# Patient Record
Sex: FEMALE | Race: WHITE | Employment: OTHER | URBAN - METROPOLITAN AREA
[De-identification: names, ages, dates, MRNs, and addresses within clinical notes are randomized per-mention and may not be internally consistent; named-entity substitution may affect disease eponyms.]

---

## 2018-07-30 ENCOUNTER — OFFICE VISIT (OUTPATIENT)
Dept: ONCOLOGY | Age: 78
End: 2018-07-30

## 2018-07-30 VITALS
HEART RATE: 56 BPM | HEIGHT: 63 IN | TEMPERATURE: 96.2 F | SYSTOLIC BLOOD PRESSURE: 177 MMHG | DIASTOLIC BLOOD PRESSURE: 75 MMHG | WEIGHT: 143.4 LBS | BODY MASS INDEX: 25.41 KG/M2 | OXYGEN SATURATION: 98 %

## 2018-07-30 DIAGNOSIS — C90.00 MULTIPLE MYELOMA NOT HAVING ACHIEVED REMISSION (HCC): Primary | ICD-10-CM

## 2018-07-30 DIAGNOSIS — Z85.118 H/O: LUNG CANCER: ICD-10-CM

## 2018-07-30 DIAGNOSIS — D51.8 VITAMIN B12 DEFICIENCY (DIETARY) ANEMIA: ICD-10-CM

## 2018-07-30 DIAGNOSIS — I10 ESSENTIAL HYPERTENSION: ICD-10-CM

## 2018-07-30 RX ORDER — SYRINGE, DISPOSABLE, 3 ML
1 SYRINGE, EMPTY DISPOSABLE MISCELLANEOUS
Qty: 12 SYRINGE | Refills: 1 | Status: SHIPPED | OUTPATIENT
Start: 2018-07-30 | End: 2019-07-19 | Stop reason: SDUPTHER

## 2018-07-30 RX ORDER — ESCITALOPRAM OXALATE 10 MG/1
TABLET ORAL
Refills: 2 | COMMUNITY
Start: 2018-05-10 | End: 2022-08-09

## 2018-07-30 RX ORDER — ZOLPIDEM TARTRATE 10 MG/1
5 TABLET ORAL DAILY
Refills: 2 | COMMUNITY
Start: 2018-06-06

## 2018-07-30 RX ORDER — LOSARTAN POTASSIUM 50 MG/1
TABLET ORAL DAILY
COMMUNITY
End: 2022-08-09

## 2018-07-30 RX ORDER — SCOLOPAMINE TRANSDERMAL SYSTEM 1 MG/1
PATCH, EXTENDED RELEASE TRANSDERMAL
Refills: 0 | COMMUNITY
Start: 2018-05-10 | End: 2022-08-09

## 2018-07-30 RX ORDER — METOPROLOL SUCCINATE 50 MG/1
TABLET, EXTENDED RELEASE ORAL DAILY
COMMUNITY

## 2018-07-30 RX ORDER — CYANOCOBALAMIN 1000 UG/ML
1000 INJECTION, SOLUTION INTRAMUSCULAR; SUBCUTANEOUS ONCE
COMMUNITY
End: 2019-10-13 | Stop reason: SDUPTHER

## 2018-07-30 RX ORDER — CYANOCOBALAMIN 1000 UG/ML
1000 INJECTION, SOLUTION INTRAMUSCULAR; SUBCUTANEOUS
Qty: 12 VIAL | Refills: 1 | Status: SHIPPED | OUTPATIENT
Start: 2018-07-30 | End: 2019-07-19 | Stop reason: SDUPTHER

## 2018-07-30 NOTE — MR AVS SNAPSHOT
[address], [address] [place] [address] Road 
[phone] Patient: Bisi Santacruz MRN: [id number]:[id number] Visit Information Date & Time Provider Department Dept. Phone Encounter #  
 7/30/2018  3:00 PM Kelley Griggs MD [facility] at [address] [place] [address] [id number] Follow-up Instructions Return in about 5 weeks (around 9/3/2018) for Multiple myeloma f/u, [place]. Upcoming Health Maintenance Date Due DTaP/Tdap/Td series (1 - Tdap) 2/5/1959 ZOSTER VACCINE AGE 60> 12/5/1997 GLAUCOMA SCREENING Q2Y 2/5/2003 Bone Densitometry (Dexa) Screening 2/5/2003 Pneumococcal 65+ High/Highest Risk (1 of 2 - PCV13) 2/5/2003 BREAST CANCER SCRN MAMMOGRAM 3/8/2017 MEDICARE YEARLY EXAM 3/14/2018 Influenza Age 5 to Adult 8/1/2018 Allergies as of 7/30/2018  Review Complete On: 7/30/2018 By: Oxana Pacheco RN Severity Noted Reaction Type Reactions Codeine  05/01/2013    Palpitations Heart races Corticosteroids (Glucocorticoids)  08/12/2015    Other (comments) \"skin turns red, extremely tired\" Other Medication  05/01/2013    Other (comments) Steroids- \"skin turns red, extremely tired\" Current Immunizations  Never Reviewed No immunizations on file. Not reviewed this visit You Were Diagnosed With   
  
 Codes Comments Multiple myeloma not having achieved remission ([facility] [address]    -  Primary ICD-10-CM: C90.00 ICD-9-CM: 203.00   
 H/O: lung cancer     ICD-10-CM: Z85.118 
ICD-9-CM: V10.11 Essential hypertension     ICD-10-CM: I10 
ICD-9-CM: 401.9 Vitamin B12 deficiency (dietary) anemia     ICD-10-CM: D51.8 ICD-9-CM: 137. 1 Vitals BP Pulse Temp Height(growth percentile) Weight(growth percentile) SpO2  
 177/75 (BP 1 Location: Left arm, BP Patient Position: Sitting) (!) 56 96.2 °F (35.7 °C) (Oral) 5' 3\" (1.6 m) 143 lb 6.4 oz (65 kg) 98% BMI OB Status Smoking Status 25.4 kg/m2 Postmenopausal Never Smoker Vitals History BMI and BSA Data Body Mass Index Body Surface Area  
 25.4 kg/m 2 1.7 m 2 Preferred Pharmacy Pharmacy Name Phone [facility] [address] AT [facility] [phone] Your Updated Medication List  
  
   
This list is accurate as of 7/30/18  4:16 PM.  Always use your most recent med list.  
  
  
  
  
 aspirin 81 mg tablet Take 81 mg by mouth.  
  
 escitalopram oxalate 10 mg tablet Commonly known as:  Sarah Basket TK 1 T PO QD  
  
 losartan 50 mg tablet Commonly known as:  COZAAR Take  by mouth daily. metoprolol succinate 50 mg XL tablet Commonly known as:  TOPROL-XL Take  by mouth daily. scopolamine 1 mg over 3 days Pt3d Commonly known as:  TRANSDERM-SCOP  
APPLY PATCH TRANSDERMALLY AS DIRECTED Q 3 DAYS  
  
 * VITAMIN B-12 PO Take  by mouth. * cyanocobalamin 1,000 mcg/mL injection Commonly known as:  VITAMIN B12  
1,000 mcg by IntraMUSCular route once. VITAMIN D3 PO Take  by mouth.  
  
 vitamin e 1,000 unit capsule Commonly known as:  E GEMS Take 1,000 Units by mouth daily. zolpidem 10 mg tablet Commonly known as:  AMBIEN  
TK 1/2 A T PO QHS PRN  
  
 * Notice: This list has 2 medication(s) that are the same as other medications prescribed for you. Read the directions carefully, and ask your doctor or other care provider to review them with you. We Performed the Following BETA-2 MICROGLOBULIN [id number] CPT(R)] CBC WITH AUTOMATED DIFF [77257 CPT(R)] FERRITIN [06547 CPT(R)] GAMMOPATHY EVAL, SPEP/TREVIN, IG QT/FLC [[id number] Custom] IRON PROFILE [id number] CPT(R)]   
 LD [69778 CPT(R)] METABOLIC PANEL, COMPREHENSIVE [53749 CPT(R)] Follow-up Instructions Return in about 5 weeks (around 9/3/2018) for Multiple myeloma f/uCinthia. To-Do List   
 Around 07/30/2018 Imaging:  XR BONE SURVEY COMP   
  
 07/31/2018 Imaging:  CT BX BONE MARROW DIAGNOSTIC Introducing hospitals & [facility] SERVICES! Dear Srinivasa Hines: Thank you for requesting a [employer] account. Our records indicate that you already have an active [employer] account. You can access your account anytime at [email or web address] Did you know that you can access your hospital and ER discharge instructions at any time in [employer]? You can also review all of your test results from your hospital stay or ER visit. Additional Information If you have questions, please visit the Frequently Asked Questions section of the [employer] website at [email or web address] Remember, [employer] is NOT to be used for urgent needs. For medical emergencies, dial 911. Now available from your iPhone and Android! Please provide this summary of care documentation to your next provider. Your primary care clinician is listed as Basim Strange. If you have any questions after today's visit, please call [phone].

## 2018-07-30 NOTE — PROGRESS NOTES
[id number] [facility] at [facility]  [phone]    Hematology / Oncology Consult    Reason for Visit:   Jayant Miller is a [de-identified] y.o. female who is seen in consultation at the request of Dr. Trav Orr for evaluation of multiple myeloma. Hematology Oncology Treatment History:     Diagnosis: Multiple myeloma    Stage: Pending    Pathology:   -Bone marrow biopsy 2/4/1998: Normocellular marrow, no pathologic diagnosis. (No evidence of plasmacytosis). -Repeat bone marrow biopsy pending    Prior Treatment: None    Current Treatment: pending    History of Present Illness:   Ms. Pam Orozco is an [de-identified] y/o female being followed for MGUS vs Multiple Myeloma at [address] [place] [address] who comes in for a second opinion. She had been Dr. Winifred Woo and more recently saw Dr. Trav Orr at [address] [place] [address]. 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  k/l ratio stable at 33. 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 Normocytic anemia in 2017 resolved. She also has B12 deficiency and has been on injections for 3-4 months. PMHX: HTN, HLD, SHA, s/p BTL, s/p tonsillectomy. SHx: Currently  to  [facility]. Her former  Jayjay Fenton passed away from Multiple Myeloma, has 3 children. Jayjay Fenton age 46, live sin Ferman Angelucci age 52 lives in [place], [place], Amy age 43 lives in [place], [place]. Pt is retired. No smoking or EtOH. -Labs 6/10/18: M-spike 3.4, CRP 0.8, ABEBA negative  -Labs 5/29/18: WBC 4.6, Hgb 11.2, MCV 94, , TSH 2.59, Hgb A1c 5.7, BUN 16, Cr 1.1    Patient states that she is not thrilled about repeating a bone marrow biopsy or undergoing treatment for multiple myeloma given her age and the fact that she saw her  suffer for 2 yrs with the disease and treatment. However, upon further discussion, she agrees to pursue bone marrow biopsy and possible treatment.  She is fairly active. Her chronic medical problems include HTN which is managed by her PCP. Past Medical History:   Diagnosis Date    Anemia     Autoimmune disease ([address]     Cancer ([facility] [address] 2015    Mass on lung.  Hx of mammogram 03/08/2016    normal    Hypertension     Monoclonal gammopathies     Pap smear for cervical cancer screening 04/25/2013    negative    Stroke [facility])     Unsure of date. Years ago. Past Surgical History:   Procedure Laterality Date    BREAST SURGERY PROCEDURE UNLISTED  1982    LEFT breast lump    HX HEENT      Lens implants    HX HEMORRHOIDECTOMY  1989    HX OTHER SURGICAL Right 09/2015    right upper lobe of lung removed due to cancer    HX TONSILLECTOMY  1962    HX TUBAL LIGATION  1969    HX WRIST FRACTURE [address]    \"cyst in wrist\"      Social History   Substance Use Topics    Smoking status: Never Smoker    Smokeless tobacco: Never Used    Alcohol use Yes      Comment: social      Family History   Problem Relation Age of Onset    Heart Disease Father     Heart Disease Sister     Diabetes Sister     Hypertension Sister     Heart Disease Brother     Heart Disease Sister     Diabetes Sister     Hypertension Sister     Heart Disease Sister     Diabetes Sister     Hypertension Sister     Hypertension Sister     Hypertension Sister     Hypertension Sister     Hypertension Brother     Hypertension Brother      Current Outpatient Prescriptions   Medication Sig    metoprolol succinate (TOPROL-XL) 50 mg XL tablet Take  by mouth daily.  losartan (COZAAR) 50 mg tablet Take  by mouth daily.  cholecalciferol, vitamin D3, (VITAMIN D3 PO) Take  by mouth.  cyanocobalamin, vitamin B-12, (VITAMIN B-12 PO) Take  by mouth.  cyanocobalamin (VITAMIN B12) 1,000 mcg/mL injection 1,000 mcg by IntraMUSCular route once.  vitamin e (E GEMS) 1,000 unit capsule Take 1,000 Units by mouth daily.  aspirin 81 mg tablet Take 81 mg by mouth.     zolpidem (AMBIEN) 10 mg tablet TK 1/2 A T PO QHS PRN    escitalopram oxalate (LEXAPRO) 10 mg tablet TK 1 T PO QD    scopolamine (TRANSDERM-SCOP) 1 mg over 3 days pt3d APPLY PATCH TRANSDERMALLY AS DIRECTED Q 3 DAYS     No current facility-administered medications for this visit. Allergies   Allergen Reactions    Codeine Palpitations     Heart races    Corticosteroids (Glucocorticoids) Other (comments)     \"skin turns red, extremely tired\"    Other Medication Other (comments)     Steroids- \"skin turns red, extremely tired\"        Review of Systems: A complete review of systems was obtained, negative except as described above. Physical Exam:     Visit Vitals    /75 (BP 1 Location: Left arm, BP Patient Position: Sitting)    Pulse (!) 56    Temp 96.2 °F (35.7 °C) (Oral)    Ht 5' 3\" (1.6 m)    Wt 143 lb 6.4 oz (65 kg)    SpO2 98%    BMI 25.4 kg/m2     ECOG PS: 0-1  General: Well developed, no acute distress  Eyes: PERRLA, EOMI, anicteric sclerae  HENT: Atraumatic, OP clear, TMs intact without erythema  Neck: Supple  Lymphatic: No cervical, supraclavicular, axillary or inguinal adenopathy  Respiratory: CTAB, normal respiratory effort  CV: Normal rate, regular rhythm, no murmurs, no peripheral edema  GI: Soft, nontender, nondistended, no masses, no hepatomegaly, no splenomegaly  MS: Normal gait and station. Digits without clubbing or cyanosis. Skin: No rashes, ecchymoses, or petechiae. Normal temperature, turgor, and texture. Neuro/Psych: Alert, oriented. 5/5 strength in all 4 extremities. Appropriate affect, normal judgment/insight. Results:     Lab Results   Component Value Date/Time    WBC 6.0 07/17/2016 12:19 PM    HGB 12.7 07/17/2016 12:19 PM    HCT 36.4 07/17/2016 12:19 PM    PLATELET 831 67/38/4245 12:19 PM    MCV 91.0 07/17/2016 12:19 PM    ABS.  NEUTROPHILS 2.9 07/17/2016 12:19 PM    Hemoglobin (POC) 11.2 (L) 09/08/2015 08:26 AM    Hematocrit (POC) 33 (L) 09/08/2015 08:26 AM     Lab Results   Component Value Date/Time    Sodium 133 (L) 07/17/2016 12:19 PM    Potassium 4.0 07/17/2016 12:19 PM    Chloride 100 07/17/2016 12:19 PM    CO2 26 07/17/2016 12:19 PM    Glucose 104 (H) 07/17/2016 12:19 PM    BUN 16 07/17/2016 12:19 PM    Creatinine 1.10 (H) 07/17/2016 12:19 PM    GFR est AA 58 (L) 07/17/2016 12:19 PM    GFR est non-AA 48 (L) 07/17/2016 12:19 PM    Calcium 9.0 07/17/2016 12:19 PM    Sodium (POC) 142 09/08/2015 08:26 AM    Potassium (POC) 3.8 09/08/2015 08:26 AM    Chloride (POC) 103 09/08/2015 08:26 AM    Glucose (POC) 114 (H) 09/14/2015 07:16 AM    BUN (POC) 18 09/08/2015 08:26 AM    Creatinine (POC) 1.1 09/08/2015 08:26 AM    Calcium, ionized (POC) 1.29 09/08/2015 08:26 AM     Lab Results   Component Value Date/Time    Bilirubin, total 0.4 07/17/2016 12:19 PM    ALT (SGPT) 17 07/17/2016 12:19 PM    AST (SGOT) 19 07/17/2016 12:19 PM    Alk. phosphatase 63 07/17/2016 12:19 PM    Protein, total 10.3 (H) 07/17/2016 12:19 PM    Albumin 4.0 07/17/2016 12:19 PM    Globulin 6.3 (H) 07/17/2016 12:19 PM     Lab Results   Component Value Date/Time    Iron 77 09/10/2015 02:25 AM    TIBC 240 (L) 09/10/2015 02:25 AM    Iron % saturation 32 09/10/2015 02:25 AM    Ferritin 160 09/10/2015 02:25 AM       Imaging:     Radiology report(s) reviewed. Assessment & Plan:   Dipesh Campo is a [de-identified] y.o. female with HTN and remote h/o lung cancer comes in for evaluation and management of multiple myeloma. 1. Multiple myeloma: Unclear if this is smoldering or active as patient has not undergone recent bone marrow biopsy and has mild anemia. Unknown whether she has bone lesions. I spent a long time discussing the difference between MGUS, smoldering myeloma and active multiple myeloma. I discussed that while her  received VRd regimen, which is the initial standard of care treatment, this is not absolute.  If she requires treatment and understands the risks/benefits, it is reasonable to start her on a doublet regimen instead of triplet, understanding that she values quality of life over progression free survival. I also discussed that we need further evaluation to determine whether she has any evidence of end organ dysfunction (CRAB criteria). For instance if her M-protein is rising and her anemia worsening, or new CRAB criteria develop, I will recommend that she start on treatment. -- Printed information about multiple myeloma  -- Check SPEP, free light chains, B2M, CBC, CMP  -- Need bone marrow biopsy since M-spike rising (was 2.8 in 4/2014)    2. H/o right lung cancer: Stage I [T2N0] s/p lobectomy by Dr. Miguel Rodriguez in 2015. Last surveillance CXR was in 6/2016.     3. HTN: On Losartan and Metoprolol. Takes ASA 81mg/d. Uncontrolled today, but pt is nervous. Will continue to monitor. 4. Vitamin B12 deficiency: Diagnosed in early 2018, on monthly B12 injections. -- Sent VitB12 injections, syringes, needles to her phamarcy    5. Goals of care: Pending    I appreciate the opportunity to participate in Ms. Sasha Yusuf's care.     Signed By: Jeffry Bell MD     July 30, 2018

## 2018-07-31 ENCOUNTER — HOSPITAL ENCOUNTER (OUTPATIENT)
Dept: LAB | Age: 78
Discharge: HOME | End: 2018-07-31
Payer: MEDICARE

## 2018-07-31 ENCOUNTER — HOSPITAL ENCOUNTER (OUTPATIENT)
Dept: GENERAL RADIOLOGY | Age: 78
Discharge: HOME | End: 2018-07-31
Payer: MEDICARE

## 2018-07-31 DIAGNOSIS — C90.00 MULTIPLE MYELOMA NOT HAVING ACHIEVED REMISSION (HCC): ICD-10-CM

## 2018-07-31 PROCEDURE — 83550 IRON BINDING TEST: CPT

## 2018-07-31 PROCEDURE — 83615 LACTATE (LD) (LDH) ENZYME: CPT

## 2018-07-31 PROCEDURE — 85025 COMPLETE CBC W/AUTO DIFF WBC: CPT

## 2018-07-31 PROCEDURE — 82728 ASSAY OF FERRITIN: CPT

## 2018-07-31 PROCEDURE — 36415 COLL VENOUS BLD VENIPUNCTURE: CPT

## 2018-07-31 PROCEDURE — 82232 ASSAY OF BETA-2 PROTEIN: CPT

## 2018-07-31 PROCEDURE — 80053 COMPREHEN METABOLIC PANEL: CPT

## 2018-07-31 PROCEDURE — 84165 PROTEIN E-PHORESIS SERUM: CPT

## 2018-07-31 PROCEDURE — 77075 RADEX OSSEOUS SURVEY COMPL: CPT

## 2018-08-02 LAB
ALBUMIN SERPL ELPH-MCNC: 3.6 G/DL (ref 2.9–4.4)
ALBUMIN SERPL-MCNC: 3.7 G/DL (ref 3.5–4.7)
ALBUMIN/GLOB SERPL: 0.7 {RATIO} (ref 0.7–1.7)
ALBUMIN/GLOB SERPL: 0.7 {RATIO} (ref 1.2–2.2)
ALP SERPL-CCNC: 51 IU/L (ref 39–117)
ALPHA1 GLOB SERPL ELPH-MCNC: 0.3 G/DL (ref 0–0.4)
ALPHA2 GLOB SERPL ELPH-MCNC: 0.7 G/DL (ref 0.4–1)
ALT SERPL-CCNC: 7 IU/L (ref 0–32)
AST SERPL-CCNC: 16 IU/L (ref 0–40)
B-GLOBULIN SERPL ELPH-MCNC: 0.9 G/DL (ref 0.7–1.3)
B2 MICROGLOB SERPL-MCNC: 2.6 MG/L (ref 0.6–2.4)
BASOPHILS # BLD AUTO: 0 X10E3/UL (ref 0–0.2)
BASOPHILS NFR BLD AUTO: 1 %
BILIRUB SERPL-MCNC: 0.4 MG/DL (ref 0–1.2)
BUN SERPL-MCNC: 8 MG/DL (ref 8–27)
BUN/CREAT SERPL: 9 (ref 12–28)
CALCIUM SERPL-MCNC: 8.9 MG/DL (ref 8.7–10.3)
CHLORIDE SERPL-SCNC: 106 MMOL/L (ref 96–106)
CO2 SERPL-SCNC: 20 MMOL/L (ref 20–29)
CREAT SERPL-MCNC: 0.89 MG/DL (ref 0.57–1)
EOSINOPHIL # BLD AUTO: 0.2 X10E3/UL (ref 0–0.4)
EOSINOPHIL NFR BLD AUTO: 5 %
ERYTHROCYTE [DISTWIDTH] IN BLOOD BY AUTOMATED COUNT: 15.8 % (ref 12.3–15.4)
FERRITIN SERPL-MCNC: 115 NG/ML (ref 15–150)
GAMMA GLOB SERPL ELPH-MCNC: 3.6 G/DL (ref 0.4–1.8)
GLOBULIN SER CALC-MCNC: 5.4 G/DL (ref 1.5–4.5)
GLOBULIN SER-MCNC: 5.5 G/DL (ref 2.2–3.9)
GLUCOSE SERPL-MCNC: 87 MG/DL (ref 65–99)
HCT VFR BLD AUTO: 32.2 % (ref 34–46.6)
HGB BLD-MCNC: 11.1 G/DL (ref 11.1–15.9)
IGA SERPL-MCNC: 13 MG/DL (ref 64–422)
IGG SERPL-MCNC: 4104 MG/DL (ref 700–1600)
IGM SERPL-MCNC: 16 MG/DL (ref 26–217)
IMM GRANULOCYTES # BLD: 0 X10E3/UL (ref 0–0.1)
IMM GRANULOCYTES NFR BLD: 0 %
INTERPRETATION SERPL IEP-IMP: ABNORMAL
IRON SATN MFR SERPL: 30 % (ref 15–55)
IRON SERPL-MCNC: 88 UG/DL (ref 27–139)
KAPPA LC FREE SER-MCNC: 261 MG/L (ref 3.3–19.4)
KAPPA LC FREE/LAMBDA FREE SER: 37.29 {RATIO} (ref 0.26–1.65)
LAMBDA LC FREE SERPL-MCNC: 7 MG/L (ref 5.7–26.3)
LDH SERPL-CCNC: 146 IU/L (ref 119–226)
LYMPHOCYTES # BLD AUTO: 2.1 X10E3/UL (ref 0.7–3.1)
LYMPHOCYTES NFR BLD AUTO: 42 %
M PROTEIN SERPL ELPH-MCNC: 3.6 G/DL
MCH RBC QN AUTO: 31.6 PG (ref 26.6–33)
MCHC RBC AUTO-ENTMCNC: 34.5 G/DL (ref 31.5–35.7)
MCV RBC AUTO: 92 FL (ref 79–97)
MONOCYTES # BLD AUTO: 0.3 X10E3/UL (ref 0.1–0.9)
MONOCYTES NFR BLD AUTO: 5 %
NEUTROPHILS # BLD AUTO: 2.3 X10E3/UL (ref 1.4–7)
NEUTROPHILS NFR BLD AUTO: 47 %
PLATELET # BLD AUTO: 198 X10E3/UL (ref 150–379)
PLEASE NOTE:, 149534: ABNORMAL
POTASSIUM SERPL-SCNC: 4.1 MMOL/L (ref 3.5–5.2)
PROT SERPL-MCNC: 9.1 G/DL (ref 6–8.5)
RBC # BLD AUTO: 3.51 X10E6/UL (ref 3.77–5.28)
SODIUM SERPL-SCNC: 137 MMOL/L (ref 134–144)
TIBC SERPL-MCNC: 291 UG/DL (ref 250–450)
UIBC SERPL-MCNC: 203 UG/DL (ref 118–369)
WBC # BLD AUTO: 4.9 X10E3/UL (ref 3.4–10.8)

## 2018-08-06 ENCOUNTER — HOSPITAL ENCOUNTER (OUTPATIENT)
Dept: CT IMAGING | Age: 78
Discharge: HOME | End: 2018-08-06
Attending: INTERNAL MEDICINE
Payer: MEDICARE

## 2018-08-06 VITALS
HEIGHT: 63 IN | HEART RATE: 53 BPM | SYSTOLIC BLOOD PRESSURE: 169 MMHG | BODY MASS INDEX: 25.34 KG/M2 | OXYGEN SATURATION: 97 % | DIASTOLIC BLOOD PRESSURE: 71 MMHG | WEIGHT: 143 LBS | RESPIRATION RATE: 13 BRPM

## 2018-08-06 DIAGNOSIS — C90.00 MULTIPLE MYELOMA NOT HAVING ACHIEVED REMISSION (HCC): ICD-10-CM

## 2018-08-06 LAB
BASOPHILS # BLD: 0 K/UL (ref 0–0.1)
BASOPHILS NFR BLD: 1 % (ref 0–1)
DIFFERENTIAL METHOD BLD: ABNORMAL
EOSINOPHIL # BLD: 0.3 K/UL (ref 0–0.4)
EOSINOPHIL NFR BLD: 5 % (ref 0–7)
ERYTHROCYTE [DISTWIDTH] IN BLOOD BY AUTOMATED COUNT: 14.1 % (ref 11.5–14.5)
HCT VFR BLD AUTO: 34.9 % (ref 35–47)
HGB BLD-MCNC: 11.6 G/DL (ref 11.5–16)
IMM GRANULOCYTES # BLD: 0 K/UL (ref 0–0.04)
IMM GRANULOCYTES NFR BLD AUTO: 0 % (ref 0–0.5)
LYMPHOCYTES # BLD: 2 K/UL (ref 0.8–3.5)
LYMPHOCYTES NFR BLD: 36 % (ref 12–49)
MCH RBC QN AUTO: 32 PG (ref 26–34)
MCHC RBC AUTO-ENTMCNC: 33.2 G/DL (ref 30–36.5)
MCV RBC AUTO: 96.1 FL (ref 80–99)
MONOCYTES # BLD: 0.3 K/UL (ref 0–1)
MONOCYTES NFR BLD: 6 % (ref 5–13)
NEUTS SEG # BLD: 2.9 K/UL (ref 1.8–8)
NEUTS SEG NFR BLD: 53 % (ref 32–75)
NRBC # BLD: 0 K/UL (ref 0–0.01)
NRBC BLD-RTO: 0 PER 100 WBC
PLATELET # BLD AUTO: 172 K/UL (ref 150–400)
PMV BLD AUTO: 10.9 FL (ref 8.9–12.9)
RBC # BLD AUTO: 3.63 M/UL (ref 3.8–5.2)
WBC # BLD AUTO: 5.6 K/UL (ref 3.6–11)

## 2018-08-06 PROCEDURE — 88305 TISSUE EXAM BY PATHOLOGIST: CPT | Performed by: INTERNAL MEDICINE

## 2018-08-06 PROCEDURE — 99152 MOD SED SAME PHYS/QHP 5/>YRS: CPT

## 2018-08-06 PROCEDURE — 38221 DX BONE MARROW BIOPSIES: CPT

## 2018-08-06 PROCEDURE — 88341 IMHCHEM/IMCYTCHM EA ADD ANTB: CPT | Performed by: INTERNAL MEDICINE

## 2018-08-06 PROCEDURE — 88365 INSITU HYBRIDIZATION (FISH): CPT | Performed by: INTERNAL MEDICINE

## 2018-08-06 PROCEDURE — 85025 COMPLETE CBC W/AUTO DIFF WBC: CPT | Performed by: RADIOLOGY

## 2018-08-06 PROCEDURE — 74011250636 HC RX REV CODE- 250/636

## 2018-08-06 PROCEDURE — 88342 IMHCHEM/IMCYTCHM 1ST ANTB: CPT | Performed by: INTERNAL MEDICINE

## 2018-08-06 PROCEDURE — 77030028872 HC BN BIOP NDL ON CNTRL TY TELE -C

## 2018-08-06 PROCEDURE — 88364 INSITU HYBRIDIZATION (FISH): CPT | Performed by: INTERNAL MEDICINE

## 2018-08-06 PROCEDURE — 88313 SPECIAL STAINS GROUP 2: CPT | Performed by: INTERNAL MEDICINE

## 2018-08-06 PROCEDURE — 85097 BONE MARROW INTERPRETATION: CPT | Performed by: INTERNAL MEDICINE

## 2018-08-06 PROCEDURE — 77030014115

## 2018-08-06 PROCEDURE — 88311 DECALCIFY TISSUE: CPT | Performed by: INTERNAL MEDICINE

## 2018-08-06 PROCEDURE — 36415 COLL VENOUS BLD VENIPUNCTURE: CPT | Performed by: RADIOLOGY

## 2018-08-06 PROCEDURE — 74011250636 HC RX REV CODE- 250/636: Performed by: RADIOLOGY

## 2018-08-06 RX ORDER — LIDOCAINE HYDROCHLORIDE 10 MG/ML
10 INJECTION INFILTRATION; PERINEURAL
Status: ACTIVE | OUTPATIENT
Start: 2018-08-06 | End: 2018-08-06

## 2018-08-06 RX ORDER — FENTANYL CITRATE 50 UG/ML
100 INJECTION, SOLUTION INTRAMUSCULAR; INTRAVENOUS
Status: DISCONTINUED | OUTPATIENT
Start: 2018-08-06 | End: 2018-08-06

## 2018-08-06 RX ORDER — MIDAZOLAM HYDROCHLORIDE 1 MG/ML
5 INJECTION, SOLUTION INTRAMUSCULAR; INTRAVENOUS
Status: DISCONTINUED | OUTPATIENT
Start: 2018-08-06 | End: 2018-08-06

## 2018-08-06 RX ORDER — LIDOCAINE HYDROCHLORIDE 10 MG/ML
INJECTION, SOLUTION EPIDURAL; INFILTRATION; INTRACAUDAL; PERINEURAL
Status: COMPLETED
Start: 2018-08-06 | End: 2018-08-06

## 2018-08-06 RX ORDER — IBUPROFEN 200 MG
200 TABLET ORAL AS NEEDED
COMMUNITY
End: 2022-08-09

## 2018-08-06 RX ADMIN — MIDAZOLAM 0.5 MG: 1 INJECTION INTRAMUSCULAR; INTRAVENOUS at 10:26

## 2018-08-06 RX ADMIN — MIDAZOLAM 1 MG: 1 INJECTION INTRAMUSCULAR; INTRAVENOUS at 10:10

## 2018-08-06 RX ADMIN — FENTANYL CITRATE 25 MCG: 50 INJECTION, SOLUTION INTRAMUSCULAR; INTRAVENOUS at 10:14

## 2018-08-06 RX ADMIN — LIDOCAINE HYDROCHLORIDE 5 ML: 10 INJECTION, SOLUTION EPIDURAL; INFILTRATION; INTRACAUDAL; PERINEURAL at 10:32

## 2018-08-06 RX ADMIN — FENTANYL CITRATE 25 MCG: 50 INJECTION, SOLUTION INTRAMUSCULAR; INTRAVENOUS at 10:18

## 2018-08-06 RX ADMIN — MIDAZOLAM 1 MG: 1 INJECTION INTRAMUSCULAR; INTRAVENOUS at 10:18

## 2018-08-06 RX ADMIN — MIDAZOLAM 1 MG: 1 INJECTION INTRAMUSCULAR; INTRAVENOUS at 10:14

## 2018-08-06 RX ADMIN — FENTANYL CITRATE 25 MCG: 50 INJECTION, SOLUTION INTRAMUSCULAR; INTRAVENOUS at 10:10

## 2018-08-06 NOTE — IP AVS SNAPSHOT
[address] 
[phone] Patient: Cass Yung MRN: [id number] TIZ:[id number] About your hospitalization You were admitted on:  August 6, 2018 You last received care in the:  [facility] RAD CT You were discharged on:  August 6, 2018 Why you were hospitalized Your primary diagnosis was:  Not on File Follow-up Information None Your Scheduled Appointments Tuesday September 11, 2018  1:30 PM EDT Any with Neelima Maier NP  
41 [facility] 301 [facility], [address] [place] [address]  
[phone] Discharge Orders None A check yumiko indicates which time of day the medication should be taken. My Medications ASK your doctor about these medications Instructions Each Dose to Equal  
 Morning Noon Evening Bedtime  
 aspirin 81 mg tablet Your last dose was: Your next dose is: Take 81 mg by mouth. 81 mg  
    
   
   
   
  
 escitalopram oxalate 10 mg tablet Commonly known as:  [place] Shaqir Your last dose was: Your next dose is:    
   
   
 TK 1 T PO QD  
     
   
   
   
  
 ibuprofen 200 mg tablet Commonly known as:  MOTRIN Your last dose was: Your next dose is: Take 200 mg by mouth as needed for Pain. 200 mg  
    
   
   
   
  
 losartan 50 mg tablet Commonly known as:  COZAAR Your last dose was: Your next dose is: Take  by mouth daily. metoprolol succinate 50 mg XL tablet Commonly known as:  TOPROL-XL Your last dose was: Your next dose is: Take  by mouth daily. Safety [place] 25 x 5/8 \" Ndle Your last dose was: Your next dose is:    
   
   
 1 Each by Does Not Apply route every thirty (30) days. 1 Each scopolamine 1 mg over 3 days Pt3d Commonly known as:  TRANSDERM-SCOP Your last dose was: Your next dose is:    
   
   
 APPLY PATCH TRANSDERMALLY AS DIRECTED Q 3 DAYS Syringe (Disposable) 3 mL Syrg Your last dose was: Your next dose is:    
   
   
 1 Each by Does Not Apply route every thirty (30) days. 1 Each * VITAMIN B-12 PO Your last dose was: Your next dose is: Take  by mouth. * cyanocobalamin 1,000 mcg/mL injection Commonly known as:  VITAMIN B12 Your last dose was: Your next dose is:    
   
   
 1,000 mcg by IntraMUSCular route once. 1000 mcg * cyanocobalamin 1,000 mcg/mL injection Commonly known as:  VITAMIN B12 Your last dose was: Your next dose is:    
   
   
 1 mL by IntraMUSCular route every thirty (30) days. 1000 mcg VITAMIN D3 PO Your last dose was: Your next dose is: Take  by mouth.  
     
   
   
   
  
 vitamin e 1,000 unit capsule Commonly known as:  E GEMS Your last dose was: Your next dose is: Take 1,000 Units by mouth daily. 1000 Units  
    
   
   
   
  
 zolpidem 10 mg tablet Commonly known as:  AMBIEN Your last dose was: Your next dose is:    
   
   
 TK 1/2 A T PO QHS PRN  
     
   
   
   
  
 * Notice: This list has 3 medication(s) that are the same as other medications prescribed for you. Read the directions carefully, and ask your doctor or other care provider to review them with you. Discharge Instructions Sedation for a Medical Procedure: After Your Visit  Instructions. Sedatives are used to relax you for a procedure. You may or may not be awake during the procedure. Common side effects of sedation medications include: Drowsiness, dizziness, euphoria, sleepiness or confusion. I 
            Unsteady gait. Loss of fine muscle control and delayed reaction time. Visual                     disturbances, difficulty focusing and blurred vision. Impaired memory recall. Follow-up care is a key component for your health and safety. Be sure to make and go to all medical appointments. Call your doctor if you are having problems. It's also a good idea to keep a list of your allergies, medicines with doses and test results on hand Home care following your sedation procedure: You may experience some of these side effects or you may not be aware of subtle changes in your behavior or reaction time. Because you received these medications, we are giving you the following instructions: Activity For your safety, you should not drive until the medicine wears off and you can think clearly and react easily. Do not drive for 24 hours. Rest when you feel tired. Getting enough sleep will help you recover. Diet You can eat your normal diet. If your stomach is upset, try bland, low-fat foods like plain rice, broiled chicken, toast, and yogurt. Drink plenty of fluids unless your doctor advised you to limit your fluids. Do not consume alcoholic beverages for 24 hours. Instructions Do not make important personal, business, or legal decisions for 24 hours. Move slowly and carefully, do not make sudden position changes. Be alert for dizziness or lightheadedness and move accordingly. Have a responsible person assist you. Do not drive for 24 hours. Do not operate equipment for 24 hours. [employer] mowers, power tools, kitchen appliances, etc.) Discharge medications: 
Resume prior to test medications as prescribed by your personal physician. If you have any questions or concerns call Radiology RN at [phone] After hours call Radiology on-call at [phone] Call 911 any time you think you may need emergency care. For example:  
             Call if you passed out (lost consciousness). The medicine is not wearing off and you cannot think clearly. Watch closely for changes in your health, and be sure to contact your doctor if                  you have any problems. Where can you learn more? Go to Slyce.be Enter K155 in the search box to learn more about \"Sedation for a Medical Procedure: After Your Visit. \"   
 
  
Bone Marrow Aspiration and Biopsy: What to Expect at Home Your Recovery The biopsy site may feel sore for several days. It can help to walk, take pain medicine, and put ice packs on the site. You will probably be able to return to work and your usual activities the day after the procedure. Your doctor or nurse will call you with the results of your test. 
This care sheet gives you a general idea about how long it will take for you to recover. But each person recovers at a different pace. Follow the steps below to get better as quickly as possible. How can you care for yourself at home? Activity 
  · Rest when you feel tired. Getting enough sleep will help you recover.  
  · You may drive when you are no longer taking pain pills and can quickly move your foot from the gas pedal to the brake. You must also be able to sit comfortably for a long period of time, even if you do not plan to go far. You might get caught in traffic.  
  · Most people are able to return to work the day after the procedure. Medicines 
  · Your doctor will tell you if and when you can restart your medicines. He or she will also give you instructions about taking any new medicines.  
  · If you take blood thinners, such as warfarin (Coumadin), clopidogrel (Plavix), or aspirin, be sure to talk to your doctor.  He or she will tell you if and when to start taking those medicines again. Make sure that you understand exactly what your doctor wants you to do.  
  · Be safe with medicines. Take pain medicines exactly as directed. ¨ If the doctor gave you a prescription medicine for pain, take it as prescribed. ¨ If you are not taking a prescription pain medicine, take an over-the-counter medicine such as acetaminophen (Tylenol), ibuprofen (Advil, Motrin), or naproxen (Aleve). Read and follow all instructions on the label. ¨ Do not take two or more pain medicines at the same time unless the doctor told you to. Many pain medicines have acetaminophen, which is Tylenol. Too much acetaminophen (Tylenol) can be harmful.  
  · If you think your pain medicine is making you sick to your stomach: 
¨ Take your medicine after meals (unless your doctor has told you not to). ¨ Ask your doctor for a different pain medicine.  
  · If your doctor prescribed antibiotics, take them as directed. Do not stop taking them just because you feel better.  
 Ice 
  · Put ice or a cold pack on the biopsy site for 10 to 20 minutes at a time. Put a thin cloth between the ice and your skin. Follow-up care is a key part of your treatment and safety. Be sure to make and go to all appointments, and call your doctor if you are having problems. It's also a good idea to know your test results and keep a list of the medicines you take. When should you call for help? Call 911 anytime you think you may need emergency care. For example, call if: 
  · You passed out (lost consciousness).  
 Call your doctor now or seek immediate medical care if: 
  · You have signs of infection, such as: 
¨ Increased pain, swelling, warmth, or redness. ¨ Red streaks leading from the biopsy site. ¨ Pus draining from the biopsy site. ¨ Swollen lymph nodes in your neck, armpits, or groin. ¨ A fever.  
 Watch closely for any changes in your health, and be sure to contact your doctor if:   · You are not getting better as expected. Where can you learn more? Go to http://domenic-daniel.info/. Enter E148 in the search box to learn more about \"Bone Marrow Aspiration and Biopsy: What to Expect at Home. \" Current as of: September 10, 2017 Content Version: 11.7 © 0901-8276 [employer]. Care instructions adapted under license by [employer] (which disclaims liability or warranty for this information). If you have questions about a medical condition or this instruction, always ask your healthcare professional. [address] any warranty or liability for your use of this information. Introducing [facility]! Dear Christoph Wharton: Thank you for requesting a [employer] account. Our records indicate that you already have an active [employer] account. You can access your account anytime at [email or web address] Did you know that you can access your hospital and ER discharge instructions at any time in [employer]? You can also review all of your test results from your hospital stay or ER visit. Additional Information If you have questions, please visit the Frequently Asked Questions section of the [employer] website at [email or web address] Remember, [employer] is NOT to be used for urgent needs. For medical emergencies, dial 911. Now available from your iPhone and Android! Introducing [employer] As a [facility] patient, I wanted to make you aware of our electronic visit tool called [employer]. [facility] 24/7 allows you to connect within minutes with a medical provider 24 hours a day, seven days a week via a mobile device or tablet or logging into a secure website from your computer. You can access [employer] from anywhere in the [place].  
 
A virtual visit might be right for you when you have a simple condition and feel like you just dont want to get out of bed, or cant get away from work for an appointment, when your regular [facility] provider is not available (evenings, weekends or holidays), or when youre out of town and need minor care. Electronic visits cost only $49 and if the [employer] 24/7 provider determines a prescription is needed to treat your condition, one can be electronically transmitted to a nearby pharmacy*. Please take a moment to enroll today if you have not already done so. The enrollment process is free and takes just a few minutes. To enroll, please download the [employer] 24/7 eloise to your tablet or phone, or visit [email or web address] to enroll on your computer. And, as an [address] [place] [address] patient with a [employer] account, the results of your visits will be scanned into your electronic medical record and your primary care provider will be able to view the scanned results. We urge you to continue to see your regular [facility] provider for your ongoing medical care. And while your primary care provider may not be the one available when you seek a [employer] virtual visit, the peace of mind you get from getting a real diagnosis real time can be priceless. For more information on [employer], view our Frequently Asked Questions (FAQs) at [email or web address]. Sincerely, 
 
Faisal Hoyt MD 
Chief Medical Officer [address] *:  certain medications cannot be prescribed via [employer] Unresulted Labs-Please follow up with your PCP about these lab tests Order Current Status CT BX BONE MARROW DIAGNOSTIC In process Providers Seen During Your Hospitalization Provider Specialty Primary office phone Barry Garcia MD Hematology and Oncology [phone] Your Primary Care Physician (PCP) Primary Care Physician Office Phone Office Fax Carmelita Officer [phone] You are allergic to the following Allergen Reactions Codeine Palpitations Heart races Corticosteroids (Glucocorticoids) Other (comments) \"skin turns red, extremely tired\" Other Medication Other (comments) Steroids- \"skin turns red, extremely tired\" Recent Documentation Height Weight Breastfeeding? BMI OB Status Smoking Status 1.6 m 64.9 kg No 25.33 kg/m2 Postmenopausal Never Smoker Emergency Contacts Name Discharge Info Relation Home Work Mobile Patrick Yusuf DISCHARGE CAREGIVER [3] Spouse [3] [phone]  
 GEETHA,Aquilino  Child [2] [phone]    
 Aquilino INIGUEZ  Other Relative [6] [phone] Patient Belongings The following personal items are in your possession at time of discharge: 
     Visual Aid: None Please provide this summary of care documentation to your next provider. Signatures-by signing, you are acknowledging that this After Visit Summary has been reviewed with you and you have received a copy. Patient Signature:  ____________________________________________________________ Date:  ____________________________________________________________  
  
Savanna Estevez Provider Signature:  ____________________________________________________________ Date:  ____________________________________________________________

## 2018-08-06 NOTE — PROGRESS NOTES
Pt received to [facility] ambulatory. Changed to gown and assessed. IV started in L AC, blood drawn/sent. Vitals taken, BP elevated, but pt nervous. LS clear, HS S1, S2. Mallampati 2 with 3 fingers. Confirmed NPO and allergies. Dr Joey Rogers here for consent. 1000--To CT, timeout at 1020, start time 1021. Pt tolerated procedure well, total of 3.5 mg versed and 75 mcg fentanyl given for procedure. Clean dressing applied to back site. Returned to [facility] in stable condition, states no pain, drowsy. Tolerating PO. Pt's  notified of pt's status. Discharge directions given and reviewed with patient and spouse. 1127--Pt IV removed, pt dressed and taken out to [facility] for discharge.

## 2018-08-06 NOTE — H&P
Interventional Radiology History and Physical (Outpatient)    4/7/0812    Patient: Zoey Lopez [de-identified] y.o. female     Referring Physician:  Reynaldo Bryan MD    Chief Complaint: anemia    History of Present Illness: bone marrow biopsy with sedation    History:  Past Medical History:   Diagnosis Date    Anemia     [facility] ([facility] [address] [facility]     Cancer ([facility] [address] 2015    Mass on lung.  Hx of mammogram 03/08/2016    normal    Hypertension     Monoclonal gammopathies     Pap smear for cervical cancer screening 04/25/2013    negative    Stroke [facility])     Unsure of date. Years ago. Family History   Problem Relation Age of Onset    Heart Disease Father     Heart Disease Sister     Diabetes Sister     Hypertension Sister     Heart Disease Brother     Heart Disease Sister     Diabetes Sister     Hypertension Sister     Heart Disease Sister     Diabetes Sister     Hypertension Sister     Hypertension Sister     Hypertension Sister     Hypertension Sister     Hypertension Brother     Hypertension Brother      Social History     Social History    Marital status:      Spouse name: N/A    Number of children: N/A    Years of education: N/A     Occupational History    Not on file. Social History Main Topics    Smoking status: Never Smoker    Smokeless tobacco: Never Used    Alcohol use Yes      Comment: social    Drug use: No    Sexual activity: No     Other Topics Concern    Not on file     Social History Narrative       Allergies: Allergies   Allergen Reactions    Codeine Palpitations     Heart races    Corticosteroids (Glucocorticoids) Other (comments)     \"skin turns red, extremely tired\"    Other Medication Other (comments)     Steroids- \"skin turns red, extremely tired\"       Prior to Admission Medications:  Prior to Admission medications    Medication Sig Start Date End Date Taking?  Authorizing Provider   ibuprofen (MOTRIN) 200 mg tablet Take 200 mg by mouth as needed for Pain.   Yes Historical Provider   metoprolol succinate (TOPROL-XL) 50 mg XL tablet Take  by mouth daily. Yes Historical Provider   losartan (COZAAR) 50 mg tablet Take  by mouth daily. Yes Historical Provider   zolpidem (AMBIEN) 10 mg tablet TK 1/2 A T PO QHS PRN 6/6/18  Yes Historical Provider   cholecalciferol, vitamin D3, (VITAMIN D3 PO) Take  by mouth. Yes Historical Provider   cyanocobalamin, vitamin B-12, (VITAMIN B-12 PO) Take  by mouth. Yes Historical Provider   escitalopram oxalate (LEXAPRO) 10 mg tablet TK 1 T PO QD 5/10/18  Yes Historical Provider   cyanocobalamin (VITAMIN B12) 1,000 mcg/mL injection 1,000 mcg by IntraMUSCular route once. Yes Historical Provider   vitamin e (E GEMS) 1,000 unit capsule Take 1,000 Units by mouth daily. Yes Historical Provider   aspirin 81 mg tablet Take 81 mg by mouth. Yes Historical Provider   scopolamine (TRANSDERM-SCOP) 1 mg over 3 days pt3d APPLY PATCH TRANSDERMALLY AS DIRECTED Q 3 DAYS 5/10/18   Historical Provider   cyanocobalamin (VITAMIN B12) 1,000 mcg/mL injection 1 mL by IntraMUSCular route every thirty (30) days. 7/30/18   Brianda Bowers MD   Syringe, Disposable, 3 mL syrg 1 Each by Does Not Apply route every thirty (30) days. 7/30/18   Brianda Bowers MD   Safety [place] 25 x 5/8 \" ndle 1 Each by Does Not Apply route every thirty (30) days. 7/30/18   Brianda Bowers MD       Physical Exam:    There were no vitals taken for this visit. General: alert, cooperative, no distress, appears stated age  Heart: normal S1 and S2  Lungs: clear to auscultation bilaterally      Plan of Care/Planned Procedure:  Risks, benefits, and alternatives reviewed with patient and she agrees to proceed with the procedure.      Shannon Patel MD

## 2018-08-06 NOTE — DISCHARGE INSTRUCTIONS
Sedation for a Medical Procedure: After Your Visit  Instructions. Sedatives are used to relax you for a procedure. You may or may not be awake during the procedure. Common side effects of sedation medications include:                   Drowsiness, dizziness, euphoria, sleepiness or confusion. I              Unsteady gait. Loss of fine muscle control and delayed reaction time. Visual                     disturbances, difficulty focusing and blurred vision. Impaired memory recall. Follow-up care is a key component for your health and safety. Be sure to make and go to all medical appointments. Call your doctor if you are having problems. It's also a good idea to keep a list of your allergies, medicines with doses and test results on hand    Home care following your sedation procedure: You may experience some of these side effects or you may not be aware of subtle changes in your behavior or reaction time. Because you received these medications, we are giving you the following instructions: Activity   For your safety, you should not drive until the medicine wears off and you can think clearly and react easily. Do not drive for 24 hours. Rest when you feel tired. Getting enough sleep will help you recover. Diet    You can eat your normal diet. If your stomach is upset, try bland, low-fat foods like plain rice, broiled chicken, toast, and yogurt. Drink plenty of fluids unless your doctor advised you to limit your fluids. Do not consume alcoholic beverages for 24 hours. Instructions  Do not make important personal, business, or legal decisions for 24 hours. Move slowly and carefully, do not make sudden position changes. Be alert for dizziness or lightheadedness and move accordingly. Have a responsible person assist you. Do not drive for 24 hours. Do not operate equipment for 24 hours. 4moms, power tools, kitchen appliances, etc.)    Discharge medications:  Resume prior to test medications as prescribed by your personal physician. If you have any questions or concerns call Radiology RN at [phone]  After hours call Radiology on-call at [phone]    Call [phone] any time you think you may need emergency care. For example:                Call if you passed out (lost consciousness). The medicine is not wearing off and you cannot think clearly. Watch closely for changes in your health, and be sure to contact your doctor if                  you have any problems. Where can you learn more? Go to nkf-pharma.be   Enter G817 in the search box to learn more about \"Sedation for a Medical Procedure: After Your Visit. \"         Bone Marrow Aspiration and Biopsy: What to Expect at Home  Your Recovery  The biopsy site may feel sore for several days. It can help to walk, take pain medicine, and put ice packs on the site. You will probably be able to return to work and your usual activities the day after the procedure. Your doctor or nurse will call you with the results of your test.  This care sheet gives you a general idea about how long it will take for you to recover. But each person recovers at a different pace. Follow the steps below to get better as quickly as possible. How can you care for yourself at home? Activity    · Rest when you feel tired. Getting enough sleep will help you recover.     · You may drive when you are no longer taking pain pills and can quickly move your foot from the gas pedal to the brake. You must also be able to sit comfortably for a long period of time, even if you do not plan to go far. You might get caught in traffic.     · Most people are able to return to work the day after the procedure. Medicines    · Your doctor will tell you if and when you can restart your medicines.  He or she will also give you instructions about taking any new medicines.     · If you take blood thinners, such as warfarin (Coumadin), clopidogrel (Plavix), or aspirin, be sure to talk to your doctor. He or she will tell you if and when to start taking those medicines again. Make sure that you understand exactly what your doctor wants you to do.     · Be safe with medicines. Take pain medicines exactly as directed. ¨ If the doctor gave you a prescription medicine for pain, take it as prescribed. ¨ If you are not taking a prescription pain medicine, take an over-the-counter medicine such as acetaminophen (Tylenol), ibuprofen (Advil, Motrin), or naproxen (Aleve). Read and follow all instructions on the label. ¨ Do not take two or more pain medicines at the same time unless the doctor told you to. Many pain medicines have acetaminophen, which is Tylenol. Too much acetaminophen (Tylenol) can be harmful.     · If you think your pain medicine is making you sick to your stomach:  ¨ Take your medicine after meals (unless your doctor has told you not to). ¨ Ask your doctor for a different pain medicine.     · If your doctor prescribed antibiotics, take them as directed. Do not stop taking them just because you feel better.    Ice    · Put ice or a cold pack on the biopsy site for 10 to 20 minutes at a time. Put a thin cloth between the ice and your skin. Follow-up care is a key part of your treatment and safety. Be sure to make and go to all appointments, and call your doctor if you are having problems. It's also a good idea to know your test results and keep a list of the medicines you take. When should you call for help? Call 911 anytime you think you may need emergency care. For example, call if:    · You passed out (lost consciousness).    Call your doctor now or seek immediate medical care if:    · You have signs of infection, such as:  ¨ Increased pain, swelling, warmth, or redness. ¨ Red streaks leading from the biopsy site.   ¨ Pus draining from the biopsy site. ¨ Swollen lymph nodes in your neck, armpits, or groin. ¨ A fever.    Watch closely for any changes in your health, and be sure to contact your doctor if:    · You are not getting better as expected. Where can you learn more? Go to http://domenic-daniel.info/. Enter E148 in the search box to learn more about \"Bone Marrow Aspiration and Biopsy: What to Expect at Home. \"  Current as of: September 10, 2017  Content Version: 11.7  © 6069-8460 [employer]. Care instructions adapted under license by [employer] (which disclaims liability or warranty for this information). If you have questions about a medical condition or this instruction, always ask your healthcare professional. [address] any warranty or liability for your use of this information.

## 2018-08-13 ENCOUNTER — TELEPHONE (OUTPATIENT)
Dept: ONCOLOGY | Age: 78
End: 2018-08-13

## 2018-08-13 NOTE — TELEPHONE ENCOUNTER
[address] at [facility]  [phone]      08/13/18 12:53 PM Spoke with patient. She stated that she would prefer to come in next week to discuss bone marrow biopsy results. Her  has liver cancer and has an appointment in [place] in September. Scheduled patient to see Dr. Mich Mabry Tuesday 08/21 at 10 AM per her request. No additional questions or concerns at this time.

## 2018-08-13 NOTE — TELEPHONE ENCOUNTER
Pt called and left a voicemail requesting her bone marrow biopsy results.  Call back number [phone]

## 2018-08-21 ENCOUNTER — OFFICE VISIT (OUTPATIENT)
Dept: ONCOLOGY | Age: 78
End: 2018-08-21

## 2018-08-21 VITALS
OXYGEN SATURATION: 98 % | DIASTOLIC BLOOD PRESSURE: 73 MMHG | WEIGHT: 143.8 LBS | HEART RATE: 61 BPM | SYSTOLIC BLOOD PRESSURE: 166 MMHG | HEIGHT: 63 IN | TEMPERATURE: 95.8 F | BODY MASS INDEX: 25.48 KG/M2

## 2018-08-21 DIAGNOSIS — Z85.118 H/O: LUNG CANCER: ICD-10-CM

## 2018-08-21 DIAGNOSIS — D47.2 SMOLDERING MULTIPLE MYELOMA: Primary | ICD-10-CM

## 2018-08-21 DIAGNOSIS — D51.8 VITAMIN B12 DEFICIENCY (DIETARY) ANEMIA: ICD-10-CM

## 2018-08-21 DIAGNOSIS — I10 ESSENTIAL HYPERTENSION: ICD-10-CM

## 2018-08-21 NOTE — PROGRESS NOTES
[address] [facility] at [facility]  [phone]    Hematology / Oncology Established Visit    Reason for Visit:   Jerry Barclay is a [de-identified] y.o. female who is seen in consultation at the request of Dr. Alex William for evaluation of multiple myeloma. Hematology Oncology Treatment History:     Diagnosis: Multiple myeloma    Stage: Pending    Pathology:   - 2/4/1998 Bone marrow biopsy: Normocellular marrow, no pathologic diagnosis. (No evidence of plasmacytosis). - 8/6/18 Bone marrow biopsy: Variably hypercellular marrow with monoclonal kappa restricted plasmacytosis, 40-50%. Trilineage hematopoiesis. Comment: The bone marrow is variably hypercellular for age ranging 20% to 70%. Monoclonal, kappa restricted plasmacytosis (40-50%) is identified. No atypical lymphocytic infiltrates are present. Trilineage hematopoiesis with maturation is present in the background. Clinical history indicates MGUS in 1990s with negative bone marrow in 1998 without plasmacytosis. Laboratory data indicate monoclonal IgG kappa of 3.6 g/dL with an increase in serum free kappa/lambda ratio of 37.9 (8/2/2018). Overall findings may represent asymptomatic (smoldering) plasma cell myeloma in the absence of myeloma-defining events. Correlation with clinical, laboratory and radiologic findings including MRI (> 1 focal lesion) is advised to determine evidence of end organ damage. Cytogenetics: 46, XX[20], normal  FISH: Del(13q), IGH rearrangement, t(14;20) detected    Prior Treatment: None    Current Treatment: pending    History of Present Illness:   Ms. Sergey Pacheco is an [de-identified] y/o female being followed for MGUS vs Multiple Myeloma at [address] [place] [address] who comes in for a second opinion. She had been Dr. Darrell Valencia and more recently saw Dr. Alex William at [address] [place] [address]. 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  k/l ratio stable at 33.  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 Normocytic anemia in 2017 resolved. She also has B12 deficiency and has been on injections for 3-4 months. PMHX: HTN, HLD, SHA, s/p BTL, s/p tonsillectomy. SHx: Currently  to  [facility]. Her former  Kassi Mccauley passed away from Multiple Myeloma, has 3 children. Kassi Mccauley age 46, live sin Devin Ramirez age 52 lives in [place], [place], Maame Charles age 43 lives in [place], [place]. Pt is retired. No smoking or EtOH. -Labs 4/2014: M-spike 2.8  -Labs 6/10/18: M-spike 3.4, CRP 0.8, ABEBA negative  -Labs 5/29/18: WBC 4.6, Hgb 11.2, MCV 94, , TSH 2.59, Hgb A1c 5.7, BUN 16, Cr 1.1    Patient underwent bone marrow biopsy and comes in for follow up today. She reiterates that she is reluctant to pursue any treatment for multiple myeloma given her age and the fact that she saw her  suffer for 2 yrs with the disease and treatment (He was treated with VRd regimen). She also states she would like to get a second opinion at [facility] when she travels there with her . She is fairly active. Her chronic medical problems include HTN which is managed by her PCP. Past Medical History:   Diagnosis Date    Anemia     Autoimmune disease ([address]     Cancer ([facility] 2015    Mass on lung.  Hx of mammogram 03/08/2016    normal    Hypertension     Monoclonal gammopathies     Pap smear for cervical cancer screening 04/25/2013    negative    Stroke [facility])     Unsure of date. Years ago.       Past Surgical History:   Procedure Laterality Date    BREAST SURGERY PROCEDURE UNLISTED  1982    LEFT breast lump    HX HEENT      Lens implants    HX HEMORRHOIDECTOMY  1989    HX OTHER SURGICAL Right 09/2015    right upper lobe of lung removed due to cancer    HX TONSILLECTOMY  1962    HX TUBAL LIGATION  1969    HX WRIST FRACTURE [address]    \"cyst in wrist\"      Social History   Substance Use Topics    Smoking status: Never Smoker    Smokeless tobacco: Never Used    Alcohol use Yes      Comment: social      Family History   Problem Relation Age of Onset    Heart Disease Father     Heart Disease Sister     Diabetes Sister     Hypertension Sister     Heart Disease Brother     Heart Disease Sister     Diabetes Sister     Hypertension Sister     Heart Disease Sister     Diabetes Sister     Hypertension Sister     Hypertension Sister     Hypertension Sister     Hypertension Sister     Hypertension Brother     Hypertension Brother      Current Outpatient Prescriptions   Medication Sig    ibuprofen (MOTRIN) 200 mg tablet Take 200 mg by mouth as needed for Pain.  metoprolol succinate (TOPROL-XL) 50 mg XL tablet Take  by mouth daily.  losartan (COZAAR) 50 mg tablet Take  by mouth daily.  zolpidem (AMBIEN) 10 mg tablet TK 1/2 A T PO QHS PRN    cholecalciferol, vitamin D3, (VITAMIN D3 PO) Take  by mouth.  cyanocobalamin, vitamin B-12, (VITAMIN B-12 PO) Take  by mouth.  escitalopram oxalate (LEXAPRO) 10 mg tablet TK 1 T PO QD    scopolamine (TRANSDERM-SCOP) 1 mg over 3 days pt3d APPLY PATCH TRANSDERMALLY AS DIRECTED Q 3 DAYS    cyanocobalamin (VITAMIN B12) 1,000 mcg/mL injection 1,000 mcg by IntraMUSCular route once.  cyanocobalamin (VITAMIN B12) 1,000 mcg/mL injection 1 mL by IntraMUSCular route every thirty (30) days.  Syringe, Disposable, 3 mL syrg 1 Each by Does Not Apply route every thirty (30) days.  Safety [place] 25 x 5/8 \" ndle 1 Each by Does Not Apply route every thirty (30) days.  vitamin e (E GEMS) 1,000 unit capsule Take 1,000 Units by mouth daily.  aspirin 81 mg tablet Take 81 mg by mouth. No current facility-administered medications for this visit.        Allergies   Allergen Reactions    Codeine Palpitations     Heart races    Corticosteroids (Glucocorticoids) Other (comments)     \"skin turns red, extremely tired\"    Other Medication Other (comments)     Steroids- \"skin turns red, extremely tired\"        Review of Systems: A complete review of systems was obtained, negative except as described above. Physical Exam:     There were no vitals taken for this visit. ECOG PS: 0-1  General: Well developed, no acute distress  Eyes: PERRLA, EOMI, anicteric sclerae  HENT: Atraumatic, OP clear, TMs intact without erythema  Neck: Supple  Lymphatic: No cervical, supraclavicular, axillary or inguinal adenopathy  Respiratory: CTAB, normal respiratory effort  CV: Normal rate, regular rhythm, no murmurs, no peripheral edema  GI: Soft, nontender, nondistended, no masses, no hepatomegaly, no splenomegaly  MS: Normal gait and station. Digits without clubbing or cyanosis. Skin: No rashes, ecchymoses, or petechiae. Normal temperature, turgor, and texture. Neuro/Psych: Alert, oriented. 5/5 strength in all 4 extremities. Appropriate affect, normal judgment/insight. Results:     Lab Results   Component Value Date/Time    WBC 5.6 08/06/2018 09:11 AM    HGB 11.6 08/06/2018 09:11 AM    HCT 34.9 (L) 08/06/2018 09:11 AM    PLATELET 202 51/16/5890 09:11 AM    MCV 96.1 08/06/2018 09:11 AM    ABS.  NEUTROPHILS 2.9 08/06/2018 09:11 AM    Hemoglobin (POC) 11.2 (L) 09/08/2015 08:26 AM    Hematocrit (POC) 33 (L) 09/08/2015 08:26 AM     Lab Results   Component Value Date/Time    Sodium 137 07/31/2018 11:11 AM    Potassium 4.1 07/31/2018 11:11 AM    Chloride 106 07/31/2018 11:11 AM    CO2 20 07/31/2018 11:11 AM    Glucose 87 07/31/2018 11:11 AM    BUN 8 07/31/2018 11:11 AM    Creatinine 0.89 07/31/2018 11:11 AM    GFR est AA 71 07/31/2018 11:11 AM    GFR est non-AA 61 07/31/2018 11:11 AM    Calcium 8.9 07/31/2018 11:11 AM    Sodium (POC) 142 09/08/2015 08:26 AM    Potassium (POC) 3.8 09/08/2015 08:26 AM    Chloride (POC) 103 09/08/2015 08:26 AM    Glucose (POC) 114 (H) 09/14/2015 07:16 AM    BUN (POC) 18 09/08/2015 08:26 AM    Creatinine (POC) 1.1 09/08/2015 08:26 AM    Calcium, ionized (POC) 1.29 09/08/2015 08:26 AM     Lab Results Component Value Date/Time    Bilirubin, total 0.4 2018 11:11 AM    ALT (SGPT) 7 2018 11:11 AM    AST (SGOT) 16 2018 11:11 AM    Alk. phosphatase 51 2018 11:11 AM    Protein, total 9.1 (H) 2018 11:11 AM    Albumin 3.7 2018 11:11 AM    Globulin 6.3 (H) 2016 12:19 PM     Lab Results   Component Value Date/Time    Iron 88 2018 11:11 AM    TIBC 291 2018 11:11 AM    Iron % saturation 30 2018 11:11 AM    Ferritin 115 2018 11:11 AM     18: IgG kappa M protein 3.6, free kappa , lambda LC 7, k/l ratio 37. IgG 4104, IgA 13, IgM 16,  B2M 2.6    Imagin/31/18 Bone survey:  IMPRESSION:  1. 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 Attention on follow-up is  recommended. 2. No other suspicious lucent lesions. Assessment & Plan:   Patti Mars is a [de-identified] y.o. female with HTN and remote h/o lung cancer comes in for evaluation and management of multiple myeloma. 1. 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 Skeletal survey negative and no evidence of end organ dysfunction. I spent a long time discussing the difference between MGUS, smoldering myeloma and active multiple myeloma. I discussed that while her  received VRd regimen, which is the initial standard of care treatment, this is not absolute. If she requires treatment and understands the risks/benefits, it is reasonable to start her on a doublet regimen instead of triplet, understanding that she values quality of life over progression free survival. I also discussed that we need further evaluation to determine whether she has any evidence of end organ dysfunction (CRAB criteria).  In smoldering myeloma, the factors associated with an increased risk of progression include: an abnormal free light chain ratio (<0.125 or >8.0), bone marrow clonal plasma cells 50 to 60 percent, a serum M protein ?3 g/dL (or progressive increase in M protein level), IgA SMM, immunoparesis, high risk genetic features, increased circulating plasma cells, abnormal plasma cell immunophenotype, and abnormalities on imaging (magnetic resonance imaging [MRI] or positron emission tomography/computed tomography [PET/CT]). Patient has 3 of these high risk features. Nonetheless, I only only recommend treatment for active rather than smoldering myeloma. I do recommend MRI imaging to rule out occult lytic lesions that may have been missed on skeletal survey. I also recommend labs and close follow up every 3 months. -- Recommend MRI of spine and pelvis to rule out occult lytic bone lesions. However, patient declines at this time. If patient changes her mind, will discuss further with Dr. Mich Kirk in [facility] for optimal test recommendations. -- f/u in 3 months with labs and MD visit     2. H/o right lung cancer: Stage I [T2N0] s/p lobectomy by Dr. Bill Gibson in 2015. Last surveillance CXR was in 6/2016.     3. HTN: On Losartan and Metoprolol. Takes ASA 81mg/d. Uncontrolled today, but pt is nervous. Will continue to monitor. 4. Vitamin B12 deficiency: Diagnosed in early 2018, on monthly B12 injections. -- Sent VitB12 injections, syringes, needles to her phamarcy    5. Goals of care: Pending    I appreciate the opportunity to participate in Ms. Sasha Yusuf's care.     Signed By: Lawrence Padilla MD     August 21, 2018 154

## 2018-10-12 ENCOUNTER — HOSPITAL ENCOUNTER (EMERGENCY)
Age: 78
Discharge: HOME | End: 2018-10-12
Attending: EMERGENCY MEDICINE
Payer: MEDICARE

## 2018-10-12 ENCOUNTER — APPOINTMENT (OUTPATIENT)
Dept: GENERAL RADIOLOGY | Age: 78
End: 2018-10-12
Attending: EMERGENCY MEDICINE
Payer: MEDICARE

## 2018-10-12 ENCOUNTER — APPOINTMENT (OUTPATIENT)
Dept: CT IMAGING | Age: 78
End: 2018-10-12
Attending: EMERGENCY MEDICINE
Payer: MEDICARE

## 2018-10-12 VITALS
TEMPERATURE: 98 F | RESPIRATION RATE: 16 BRPM | SYSTOLIC BLOOD PRESSURE: 188 MMHG | WEIGHT: 143 LBS | OXYGEN SATURATION: 100 % | DIASTOLIC BLOOD PRESSURE: 73 MMHG | BODY MASS INDEX: 25.34 KG/M2 | HEIGHT: 63 IN | HEART RATE: 69 BPM

## 2018-10-12 DIAGNOSIS — I10 ACCELERATED HYPERTENSION: Primary | ICD-10-CM

## 2018-10-12 LAB
ALBUMIN SERPL-MCNC: 3.6 G/DL (ref 3.5–5)
ALBUMIN/GLOB SERPL: 0.5 {RATIO} (ref 1.1–2.2)
ALP SERPL-CCNC: 63 U/L (ref 45–117)
ALT SERPL-CCNC: 18 U/L (ref 12–78)
ANION GAP SERPL CALC-SCNC: 9 MMOL/L (ref 5–15)
APPEARANCE UR: CLEAR
AST SERPL-CCNC: 19 U/L (ref 15–37)
BASOPHILS # BLD: 0 K/UL (ref 0–0.1)
BASOPHILS NFR BLD: 1 % (ref 0–1)
BILIRUB SERPL-MCNC: 0.4 MG/DL (ref 0.2–1)
BILIRUB UR QL: NEGATIVE
BNP SERPL-MCNC: 708 PG/ML (ref 0–450)
BUN SERPL-MCNC: 12 MG/DL (ref 6–20)
BUN/CREAT SERPL: 11 (ref 12–20)
CALCIUM SERPL-MCNC: 8.9 MG/DL (ref 8.5–10.1)
CHLORIDE SERPL-SCNC: 106 MMOL/L (ref 97–108)
CO2 SERPL-SCNC: 25 MMOL/L (ref 21–32)
COLOR UR: NORMAL
COMMENT, HOLDF: NORMAL
CREAT SERPL-MCNC: 1.07 MG/DL (ref 0.55–1.02)
DIFFERENTIAL METHOD BLD: ABNORMAL
EOSINOPHIL # BLD: 0.2 K/UL (ref 0–0.4)
EOSINOPHIL NFR BLD: 4 % (ref 0–7)
ERYTHROCYTE [DISTWIDTH] IN BLOOD BY AUTOMATED COUNT: 13.9 % (ref 11.5–14.5)
GLOBULIN SER CALC-MCNC: 6.6 G/DL (ref 2–4)
GLUCOSE SERPL-MCNC: 103 MG/DL (ref 65–100)
GLUCOSE UR STRIP.AUTO-MCNC: NEGATIVE MG/DL
HCT VFR BLD AUTO: 35.6 % (ref 35–47)
HGB BLD-MCNC: 11.9 G/DL (ref 11.5–16)
HGB UR QL STRIP: NEGATIVE
IMM GRANULOCYTES # BLD: 0 K/UL (ref 0–0.04)
IMM GRANULOCYTES NFR BLD AUTO: 0 % (ref 0–0.5)
KETONES UR QL STRIP.AUTO: NEGATIVE MG/DL
LEUKOCYTE ESTERASE UR QL STRIP.AUTO: NEGATIVE
LIPASE SERPL-CCNC: 98 U/L (ref 73–393)
LYMPHOCYTES # BLD: 1.9 K/UL (ref 0.8–3.5)
LYMPHOCYTES NFR BLD: 37 % (ref 12–49)
MAGNESIUM SERPL-MCNC: 1.7 MG/DL (ref 1.6–2.4)
MCH RBC QN AUTO: 31.6 PG (ref 26–34)
MCHC RBC AUTO-ENTMCNC: 33.4 G/DL (ref 30–36.5)
MCV RBC AUTO: 94.4 FL (ref 80–99)
MONOCYTES # BLD: 0.3 K/UL (ref 0–1)
MONOCYTES NFR BLD: 5 % (ref 5–13)
NEUTS SEG # BLD: 2.8 K/UL (ref 1.8–8)
NEUTS SEG NFR BLD: 54 % (ref 32–75)
NITRITE UR QL STRIP.AUTO: NEGATIVE
NRBC # BLD: 0 K/UL (ref 0–0.01)
NRBC BLD-RTO: 0 PER 100 WBC
PH UR STRIP: 5.5 [PH] (ref 5–8)
PLATELET # BLD AUTO: 148 K/UL (ref 150–400)
PMV BLD AUTO: 11.7 FL (ref 8.9–12.9)
POTASSIUM SERPL-SCNC: 3.8 MMOL/L (ref 3.5–5.1)
PROT SERPL-MCNC: 10.2 G/DL (ref 6.4–8.2)
PROT UR STRIP-MCNC: NEGATIVE MG/DL
RBC # BLD AUTO: 3.77 M/UL (ref 3.8–5.2)
SAMPLES BEING HELD,HOLD: NORMAL
SODIUM SERPL-SCNC: 140 MMOL/L (ref 136–145)
SP GR UR REFRACTOMETRY: 1.01 (ref 1–1.03)
TROPONIN I SERPL-MCNC: <0.05 NG/ML
UROBILINOGEN UR QL STRIP.AUTO: 0.2 EU/DL (ref 0.2–1)
WBC # BLD AUTO: 5.2 K/UL (ref 3.6–11)

## 2018-10-12 PROCEDURE — 70450 CT HEAD/BRAIN W/O DYE: CPT

## 2018-10-12 PROCEDURE — 71046 X-RAY EXAM CHEST 2 VIEWS: CPT

## 2018-10-12 PROCEDURE — 80053 COMPREHEN METABOLIC PANEL: CPT | Performed by: EMERGENCY MEDICINE

## 2018-10-12 PROCEDURE — 74011000250 HC RX REV CODE- 250: Performed by: EMERGENCY MEDICINE

## 2018-10-12 PROCEDURE — 74011250637 HC RX REV CODE- 250/637: Performed by: NURSE PRACTITIONER

## 2018-10-12 PROCEDURE — 96374 THER/PROPH/DIAG INJ IV PUSH: CPT

## 2018-10-12 PROCEDURE — 85025 COMPLETE CBC W/AUTO DIFF WBC: CPT | Performed by: EMERGENCY MEDICINE

## 2018-10-12 PROCEDURE — 84484 ASSAY OF TROPONIN QUANT: CPT | Performed by: EMERGENCY MEDICINE

## 2018-10-12 PROCEDURE — 83735 ASSAY OF MAGNESIUM: CPT | Performed by: EMERGENCY MEDICINE

## 2018-10-12 PROCEDURE — 83880 ASSAY OF NATRIURETIC PEPTIDE: CPT | Performed by: EMERGENCY MEDICINE

## 2018-10-12 PROCEDURE — 99284 EMERGENCY DEPT VISIT MOD MDM: CPT

## 2018-10-12 PROCEDURE — 83690 ASSAY OF LIPASE: CPT | Performed by: EMERGENCY MEDICINE

## 2018-10-12 PROCEDURE — 93005 ELECTROCARDIOGRAM TRACING: CPT

## 2018-10-12 PROCEDURE — 36415 COLL VENOUS BLD VENIPUNCTURE: CPT | Performed by: EMERGENCY MEDICINE

## 2018-10-12 PROCEDURE — 81003 URINALYSIS AUTO W/O SCOPE: CPT | Performed by: EMERGENCY MEDICINE

## 2018-10-12 RX ORDER — METOPROLOL TARTRATE 50 MG/1
50 TABLET ORAL
Status: COMPLETED | OUTPATIENT
Start: 2018-10-12 | End: 2018-10-12

## 2018-10-12 RX ORDER — LOSARTAN POTASSIUM 50 MG/1
25 TABLET ORAL
Status: COMPLETED | OUTPATIENT
Start: 2018-10-12 | End: 2018-10-12

## 2018-10-12 RX ORDER — LABETALOL HYDROCHLORIDE 5 MG/ML
20 INJECTION, SOLUTION INTRAVENOUS
Status: COMPLETED | OUTPATIENT
Start: 2018-10-12 | End: 2018-10-12

## 2018-10-12 RX ADMIN — LOSARTAN POTASSIUM 25 MG: 50 TABLET ORAL at 18:56

## 2018-10-12 RX ADMIN — METOPROLOL TARTRATE 50 MG: 50 TABLET ORAL at 18:57

## 2018-10-12 RX ADMIN — LABETALOL HYDROCHLORIDE 20 MG: 5 INJECTION INTRAVENOUS at 16:40

## 2018-10-12 NOTE — DISCHARGE INSTRUCTIONS
Acute High Blood Pressure: Care Instructions  Your Care Instructions    Acute high blood pressure is very high blood pressure. It's a serious problem. Very high blood pressure can damage your brain, heart, eyes, and kidneys. You may have been given medicines to lower your blood pressure. You may have gotten them as pills or through a needle in one of your veins. This is called an IV. And maybe you were given other medicines too. These can be needed when high blood pressure causes other problems. To keep your blood pressure at a lower level, you may need to make healthy lifestyle changes. And you will probably need to take medicines. Be sure to follow up with your doctor about your blood pressure and what you can do about it. Follow-up care is a key part of your treatment and safety. Be sure to make and go to all appointments, and call your doctor if you are having problems. It's also a good idea to know your test results and keep a list of the medicines you take. How can you care for yourself at home? · See your doctor as often as he or she recommends. This is to make sure your blood pressure is under control. You will probably go at least 2 times a year. But it may be more often at first.  · Take your blood pressure medicine exactly as prescribed. You may take one or more types. They include diuretics, beta-blockers, ACE inhibitors, calcium channel blockers, and angiotensin II receptor blockers. Call your doctor if you think you are having a problem with your medicine. · If you take blood pressure medicine, talk to your doctor before you take decongestants or anti-inflammatory medicine, such as ibuprofen. These can raise blood pressure. · Learn how to check your blood pressure at home. Check it often. · Ask your doctor if it's okay to drink alcohol. · Talk to your doctor about lifestyle changes that can help blood pressure. These include being active and not smoking.   When should you call for help?  Call 911 anytime you think you may need emergency care. This may mean having symptoms that suggest that your blood pressure is causing a serious heart or blood vessel problem. Your blood pressure may be over 180/120.   For example, call 911 if:    · You have symptoms of a heart attack. These may include:  ¨ Chest pain or pressure, or a strange feeling in the chest.  ¨ Sweating. ¨ Shortness of breath. ¨ Nausea or vomiting. ¨ Pain, pressure, or a strange feeling in the back, neck, jaw, or upper belly or in one or both shoulders or arms. ¨ Lightheadedness or sudden weakness. ¨ A fast or irregular heartbeat.     · You have symptoms of a stroke. These may include:  ¨ Sudden numbness, tingling, weakness, or loss of movement in your face, arm, or leg, especially on only one side of your body. ¨ Sudden vision changes. ¨ Sudden trouble speaking. ¨ Sudden confusion or trouble understanding simple statements. ¨ Sudden problems with walking or balance. ¨ A sudden, severe headache that is different from past headaches.     · You have severe back or belly pain.    Do not wait until your blood pressure comes down on its own. Get help right away.   Call your doctor now or seek immediate care if:    · Your blood pressure is much higher than normal (such as 180/120 or higher), but you don't have symptoms.     · You think high blood pressure is causing symptoms, such as:  ¨ Severe headache. ¨ Blurry vision.    Watch closely for changes in your health, and be sure to contact your doctor if:    · Your blood pressure measures higher than your doctor recommends at least 2 times. That means the top number is higher or the bottom number is higher, or both.     · You think you may be having side effects from your blood pressure medicine. Where can you learn more? Go to http://domenic-daniel.info/. Enter W349 in the search box to learn more about \"Acute High Blood Pressure: Care Instructions. \"  Current as of: December 6, 2017  Content Version: 11.8  © 2705-5146 [employer]. Care instructions adapted under license by [employer] (which disclaims liability or warranty for this information). If you have questions about a medical condition or this instruction, always ask your healthcare professional. [address] any warranty or liability for your use of this information.

## 2018-10-12 NOTE — ED NOTES
Bedside and Verbal shift change report given to Abimael (oncoming nurse) by Jeison Silveira (offgoing nurse). Report included the following information SBAR, ED Summary, MAR and Recent Results.

## 2018-10-12 NOTE — ED TRIAGE NOTES
Pt states she forgot to take her HTN meds last night so she took them this am. Pt here for elevated BP.

## 2018-10-12 NOTE — ED NOTES
Verbal shift change report given to Clara (oncoming nurse) by Esvin Smith (offgoing nurse). Report included the following information SBAR, Kardex, ED Summary, [facility] and Recent Results.

## 2018-10-12 NOTE — ED PROVIDER NOTES
HPI Comments: 3:11 PM 
I have evaluated the patient as the Provider in Triage. I have reviewed Her vital signs and the triage nurse assessment. I have talked with the patient and any available family and advised that I am the provider in triage and have ordered the appropriate study to initiate their work up based on the clinical presentation during my assessment. I have advised that the patient will be accommodated in the Main ED as soon as possible. I have also requested to contact the triage nurse or myself immediately if the patient experiences any changes in their condition during this brief waiting period. Mary Kay Jackson DO 
 
[de-identified] y.o. Female presents with HTN, starting last night. Found to to be 201/104 today. Rechecked and found to be very high. Called PCP Dr. Vin Blake and advised ED eval. Feeling nauseous, no V, no bitemporal headache waxing and waning with blurred vision. No syncope. No chest pain, no palps,. No numbness or weakness. Missed a dose of BP med last night, but took them today. No recent changes to medication regimen. Krystyna Lafleur is a [de-identified] y.o. female with Hx of smoldering myeloma, HTN, anxiety, anemia  who presents ambulatory w/ SO to [facility] ED with cc of elevated BP reading. Pt reports that she forgot to take her Metoprolol and Losartan last night. States she was preoccupied w/ storm and anxious about weather changes. \"Has not felt right\" all day today w/o any specific s/sx. Checked her BP after taking her medications this morning. BP was found 204/100s at home. She states that she called Dr. Vin Blake (cardiology) who advised ED evaluation. Pt states hx of accelerated HTN in the past, not to this extent. Also reports that she is very anxious and doesn't feel that is helping BP. Denies any F/C, numbness/ tingling/ weakness in extremities, HA,  N/V/D, cough, congestion, CP, SOB, dysuria, urinary frequency/hesitancy, flank pain.   
 
 
 
 
 
 
PCP: Nic Bates, NATANAEL 
 
 There are no other complaints, changes or physical findings at this time. Past Medical History:  
Diagnosis Date  Anemia  Autoimmune disease ([facility] [address]  Cancer ([facility] [address] [facility] 2015 Mass on lung.  Hx of mammogram 03/08/2016  
 normal  
 Hypertension  Monoclonal gammopathies  Pap smear for cervical cancer screening 04/25/2013  
 negative  Stroke ([facility] [address] Unsure of date. Years ago. Past Surgical History:  
Procedure Laterality Date [address] LEFT breast lump  HX HEENT Lens implants [address]  HX OTHER SURGICAL Right 09/2015  
 right upper lobe of lung removed due to cancer [address] [facility] \"cyst in wrist\" Family History:  
Problem Relation Age of Onset  Heart Disease Father  Heart Disease Sister  Diabetes Sister  Hypertension Sister  Heart Disease Brother  Heart Disease Sister  Diabetes Sister  Hypertension Sister  Heart Disease Sister  Diabetes Sister  Hypertension Sister  Hypertension Sister  Hypertension Sister  Hypertension Sister  Hypertension Brother  Hypertension Brother Social History Social History  Marital status:  Spouse name: N/A  
 Number of children: N/A  
 Years of education: N/A Occupational History  Not on file. Social History Main Topics  Smoking status: Never Smoker  Smokeless tobacco: Never Used  Alcohol use Yes Comment: social  
 Drug use: No  
 Sexual activity: No  
 
Other Topics Concern  Not on file Social History Narrative ALLERGIES: Codeine; Corticosteroids (glucocorticoids); and Other medication Review of Systems Constitutional: Negative for activity change, appetite change, chills and fever. HENT: Negative for congestion, rhinorrhea, sinus pressure, sneezing and sore throat. Eyes: Negative for pain, discharge and visual disturbance. Respiratory: Negative for cough and shortness of breath. Cardiovascular: Negative for chest pain. Gastrointestinal: Negative for abdominal pain, diarrhea, nausea and vomiting. Genitourinary: Negative for dysuria, flank pain, frequency and urgency. Musculoskeletal: Negative for arthralgias, back pain, gait problem, joint swelling, myalgias and neck pain. Skin: Negative for color change and rash. Neurological: Negative for dizziness, speech difficulty, weakness, light-headedness, numbness and headaches. Psychiatric/Behavioral: Negative for agitation, behavioral problems and confusion. All other systems reviewed and are negative. Vitals:  
 10/12/18 1857 10/12/18 1930 10/12/18 2000 10/12/18 2002 BP: (!) 214/76 (!) 204/77 188/73 188/73 Pulse: 69 Resp:      
Temp:      
SpO2:  98% 100% Weight:      
Height:      
      
 
Physical Exam  
Constitutional: She is oriented to person, place, and time. She appears well-developed and well-nourished. No distress. HENT:  
Head: Normocephalic and atraumatic. Right Ear: External ear normal.  
Left Ear: External ear normal.  
Nose: Nose normal.  
Mouth/Throat: Oropharynx is clear and moist. No oropharyngeal exudate. Eyes: Conjunctivae and EOM are normal. Pupils are equal, round, and reactive to light. Neck: Normal range of motion. Neck supple. Cardiovascular: Normal rate, regular rhythm, normal heart sounds and intact distal pulses. Pulmonary/Chest: Effort normal and breath sounds normal.  
Abdominal: Soft. There is no tenderness. There is no rebound and no guarding. Musculoskeletal: Normal range of motion. Neurological: She is alert and oriented to person, place, and time. Skin: Skin is warm and dry. Psychiatric: She has a normal mood and affect. Her behavior is normal. Judgment and thought content normal.  
Nursing note and vitals reviewed.  
  
 
BRUNILDA 
 Number of Diagnoses or Management Options Accelerated hypertension:  
Diagnosis management comments: Ddx: accelerate HTN, ACS, CHF, CVA, TIA  
 
[de-identified] yo F presents w/ concern for elevated BP feeling and vague feeling of being unwell. Given IV Labetalol, home medications while in the ED. proBNP elevated- but not volume up on exam. (-) trop. EKG w/o emergent findings. CT reassuring. Pt advised for admission/ control BP- wants to be d/c home. Discussed risk/ benefit for admission- pt is in sound mind and wants to be d/c home. Amount and/or Complexity of Data Reviewed Clinical lab tests: ordered and reviewed Tests in the radiology section of CPT®: reviewed and ordered Review and summarize past medical records: yes Discuss the patient with other providers: yes Donal Cheney MD  
) 
 
 
 
 
ED Course Procedures PROGRESS NOTE: 
7:50 PM 
Patient is asymptomatic, but systolic BP is considerably elevated. Discussed care w/ Dr. Elieser Jarrell. Will discuss need for hospital admission w/ patient. PROGRESS NOTE: 
7:58 PM 
Offered admission to patient, but patient remains anxious. She notes that she believes that being in the hospital is what is making her anxious and that she would like to go home despite elevated systolic. Discussed risks and benefits with the patient, but she still wants to go home. Explained to patient that if any sx occur, she should return to the ED. Discussed with Dr. Elieser Jarrell and patient is LABORATORY TESTS: 
Recent Results (from the past 12 hour(s)) SAMPLES BEING HELD Collection Time: 10/12/18  3:40 PM  
Result Value Ref Range SAMPLES BEING HELD BLUE,GOLD COMMENT Add-on orders for these samples will be processed based on acceptable specimen integrity and analyte stability, which may vary by analyte. METABOLIC PANEL, COMPREHENSIVE Collection Time: 10/12/18  3:40 PM  
Result Value Ref Range Sodium 140 136 - 145 mmol/L  Potassium 3.8 3.5 - 5.1 mmol/L  
 Chloride 106 97 - 108 mmol/L  
 CO2 25 21 - 32 mmol/L Anion gap 9 5 - 15 mmol/L Glucose 103 (H) 65 - 100 mg/dL BUN 12 6 - 20 MG/DL Creatinine 1.07 (H) 0.55 - 1.02 MG/DL  
 BUN/Creatinine ratio 11 (L) 12 - 20 GFR est AA 60 (L) >60 ml/min/1.73m2 GFR est non-AA 49 (L) >60 ml/min/1.73m2 Calcium 8.9 8.5 - 10.1 MG/DL Bilirubin, total 0.4 0.2 - 1.0 MG/DL  
 ALT (SGPT) 18 12 - 78 U/L  
 AST (SGOT) 19 15 - 37 U/L Alk. phosphatase 63 45 - 117 U/L Protein, total 10.2 (H) 6.4 - 8.2 g/dL Albumin 3.6 3.5 - 5.0 g/dL Globulin 6.6 (H) 2.0 - 4.0 g/dL A-G Ratio 0.5 (L) 1.1 - 2.2    
CBC WITH AUTOMATED DIFF Collection Time: 10/12/18  3:40 PM  
Result Value Ref Range WBC 5.2 3.6 - 11.0 K/uL  
 RBC 3.77 (L) 3.80 - 5.20 M/uL  
 HGB 11.9 11.5 - 16.0 g/dL HCT 35.6 35.0 - 47.0 % MCV 94.4 80.0 - 99.0 FL  
 MCH 31.6 26.0 - 34.0 PG  
 MCHC 33.4 30.0 - 36.5 g/dL  
 RDW 13.9 11.5 - 14.5 % PLATELET 668 (L) 184 - 400 K/uL MPV 11.7 8.9 - 12.9 FL  
 NRBC 0.0 0  WBC ABSOLUTE NRBC 0.00 0.00 - 0.01 K/uL NEUTROPHILS 54 32 - 75 % LYMPHOCYTES 37 12 - 49 % MONOCYTES 5 5 - 13 % EOSINOPHILS 4 0 - 7 % BASOPHILS 1 0 - 1 % IMMATURE GRANULOCYTES 0 0.0 - 0.5 % ABS. NEUTROPHILS 2.8 1.8 - 8.0 K/UL  
 ABS. LYMPHOCYTES 1.9 0.8 - 3.5 K/UL  
 ABS. MONOCYTES 0.3 0.0 - 1.0 K/UL  
 ABS. EOSINOPHILS 0.2 0.0 - 0.4 K/UL  
 ABS. BASOPHILS 0.0 0.0 - 0.1 K/UL  
 ABS. IMM. GRANS. 0.0 0.00 - 0.04 K/UL  
 DF AUTOMATED MAGNESIUM Collection Time: 10/12/18  3:40 PM  
Result Value Ref Range Magnesium 1.7 1.6 - 2.4 mg/dL LIPASE Collection Time: 10/12/18  3:40 PM  
Result Value Ref Range Lipase 98 73 - 393 U/L  
TROPONIN I Collection Time: 10/12/18  3:40 PM  
Result Value Ref Range Troponin-I, Qt. <0.05 <0.05 ng/mL NT-PRO BNP Collection Time: 10/12/18  3:40 PM  
Result Value Ref Range NT pro- (H) 0 - 450 PG/ML  
EKG, 12 LEAD, INITIAL Collection Time: 10/12/18  4:26 PM  
Result Value Ref Range Ventricular Rate 64 BPM  
 Atrial Rate 64 BPM  
 P-R Interval 172 ms QRS Duration 94 ms Q-T Interval 418 ms QTC Calculation (Bezet) 431 ms Calculated P Axis 57 degrees Calculated R Axis 8 degrees Calculated T Axis 4 degrees Diagnosis Normal sinus rhythm Nonspecific ST abnormality Abnormal ECG When compared with ECG of 17-JUL-2016 11:51, 
T wave inversion no longer evident in Lateral leads URINALYSIS W/ RFLX MICROSCOPIC Collection Time: 10/12/18  4:36 PM  
Result Value Ref Range Color YELLOW/STRAW Appearance CLEAR CLEAR Specific gravity 1.008 1.003 - 1.030    
 pH (UA) 5.5 5.0 - 8.0 Protein NEGATIVE  NEG mg/dL Glucose NEGATIVE  NEG mg/dL Ketone NEGATIVE  NEG mg/dL Bilirubin NEGATIVE  NEG Blood NEGATIVE  NEG Urobilinogen 0.2 0.2 - 1.0 EU/dL Nitrites NEGATIVE  NEG Leukocyte Esterase NEGATIVE  NEG    
 
 
IMAGING RESULTS: 
CT HEAD WO CONT Final Result XR CHEST PA LAT Final Result Initial Result:  
  Impression:  
  IMPRESSION:  
 
No change or acute intracranial abnormality 
 
  
  Narrative:  
  EXAM:  CT HEAD WO CONT INDICATION:   significant HTN 
 
COMPARISON: July 17, 2016. CONTRAST:  None. TECHNIQUE: Unenhanced CT of the head was performed using 5 mm images. Brain and 
bone windows were generated.  CT dose reduction was achieved through use of a 
standardized protocol tailored for this examination and automatic exposure 
control for dose modulation.   
 
FINDINGS: 
The ventricles and sulci are normal in size, shape and configuration and 
midline. There is encephalomalacia in the right parietal lobe, unchanged. There 
is no intracranial hemorrhage, extra-axial collection, mass, mass effect or 
midline shift.  The basilar cisterns are open. No acute infarct is identified. The bone windows demonstrate no abnormalities.  The visualized portions of the 
paranasal sinuses and mastoid air cells are clear. There are vascular 
calcifications     
    
  XR CHEST PA LAT (Final result) Result time: 10/12/18 15:53:08  
  Final result by Rober Grey Results In (10/12/18 15:52:28)  
  Initial Result:  
  Impression:  
  IMPRESSION: 
1. Heart size is at the limits of normal. The lungs are clear  
  Narrative:  
  Exam:  2 view chest 
 
Indication: Hypertension. COMPARISON: 7/17/2016 PA and lateral views demonstrate the heart size is at the limits of normal. The 
lungs are clear. Visualized osseous structures are unremarkable. MEDICATIONS GIVEN: 
Medications  
labetalol (NORMODYNE;TRANDATE) injection 20 mg (20 mg IntraVENous Given 10/12/18 1640) losartan (COZAAR) tablet 25 mg (25 mg Oral Given 10/12/18 1856) metoprolol tartrate (LOPRESSOR) tablet 50 mg (50 mg Oral Given 10/12/18 1857) IMPRESSION: 
1. Accelerated hypertension PLAN: 
1. Discharge Medication List as of 10/12/2018  7:57 PM  
  
CONTINUE these medications which have NOT CHANGED Details  
ibuprofen (MOTRIN) 200 mg tablet Take 200 mg by mouth as needed for Pain., Historical Med  
  
metoprolol succinate (TOPROL-XL) 50 mg XL tablet Take  by mouth daily. , Historical Med  
  
losartan (COZAAR) 50 mg tablet Take  by mouth daily. , Historical Med  
  
zolpidem (AMBIEN) 10 mg tablet TK 1/2 A T PO QHS PRN, Historical Med, R-2  
  
cholecalciferol, vitamin D3, (VITAMIN D3 PO) Take  by mouth., Historical Med  
  
escitalopram oxalate (LEXAPRO) 10 mg tablet TK 1 T PO QD, Historical Med, R-2  
  
scopolamine (TRANSDERM-SCOP) 1 mg over 3 days pt3d APPLY PATCH TRANSDERMALLY AS DIRECTED Q 3 DAYS, Historical Med, R-0  
  
!! cyanocobalamin (VITAMIN B12) 1,000 mcg/mL injection 1,000 mcg by IntraMUSCular route once., Historical Med  
  
!! cyanocobalamin (VITAMIN B12) 1,000 mcg/mL injection 1 mL by IntraMUSCular route every thirty (30) days. , Normal, Disp-12 Vial, R-1  
  
 Syringe, Disposable, 3 mL syrg 1 Each by Does Not Apply route every thirty (30) days. , Normal, Disp-12 Syringe, R-1 Safety Needles 25 x 5/8 \" ndle 1 Each by Does Not Apply route every thirty (30) days. , Normal, Disp-12 Pen Needle, R-1  
  
vitamin e (E GEMS) 1,000 unit capsule Take 1,000 Units by mouth daily. , Historical Med  
  
aspirin 81 mg tablet Take 81 mg by mouth., Historical Med  
  
 !! - Potential duplicate medications found. Please discuss with provider. 2.  
Follow-up Information Follow up With Details Comments Contact Info Karen Suazo NP Schedule an appointment as soon as possible for a visit  [address] [place] [address] 101 1007 [facility] 
[phone] [facility] EMERGENCY DEPT Go to As needed, If symptoms worsen [address] 
[phone] 3. Return to ED if worse Discharge Note: The patient is ready for discharge. The patient's signs, symptoms, diagnosis, and discharge instruction have been discussed and the patient has conveyed their understanding. The patient is to follow up as recommended or return to the ER should their symptoms worsen. Plan has been discussed and the patient is in agreement.  
 
Karlos Junior NP

## 2018-10-13 ENCOUNTER — HOSPITAL ENCOUNTER (EMERGENCY)
Age: 78
Discharge: HOME | End: 2018-10-13
Attending: EMERGENCY MEDICINE
Payer: MEDICARE

## 2018-10-13 VITALS
SYSTOLIC BLOOD PRESSURE: 122 MMHG | BODY MASS INDEX: 25.34 KG/M2 | WEIGHT: 143 LBS | RESPIRATION RATE: 15 BRPM | DIASTOLIC BLOOD PRESSURE: 53 MMHG | OXYGEN SATURATION: 97 % | TEMPERATURE: 97.8 F | HEART RATE: 54 BPM | HEIGHT: 63 IN

## 2018-10-13 DIAGNOSIS — I10 HYPERTENSION, UNSPECIFIED TYPE: Primary | ICD-10-CM

## 2018-10-13 LAB
ALBUMIN SERPL-MCNC: 3.4 G/DL (ref 3.5–5)
ALBUMIN/GLOB SERPL: 0.5 {RATIO} (ref 1.1–2.2)
ALP SERPL-CCNC: 61 U/L (ref 45–117)
ALT SERPL-CCNC: 18 U/L (ref 12–78)
ANION GAP SERPL CALC-SCNC: 8 MMOL/L (ref 5–15)
AST SERPL-CCNC: 17 U/L (ref 15–37)
BASOPHILS # BLD: 0 K/UL (ref 0–0.1)
BASOPHILS NFR BLD: 0 % (ref 0–1)
BILIRUB SERPL-MCNC: 0.5 MG/DL (ref 0.2–1)
BUN SERPL-MCNC: 9 MG/DL (ref 6–20)
BUN/CREAT SERPL: 8 (ref 12–20)
CALCIUM SERPL-MCNC: 8.8 MG/DL (ref 8.5–10.1)
CHLORIDE SERPL-SCNC: 105 MMOL/L (ref 97–108)
CO2 SERPL-SCNC: 26 MMOL/L (ref 21–32)
COMMENT, HOLDF: NORMAL
CREAT SERPL-MCNC: 1.07 MG/DL (ref 0.55–1.02)
DIFFERENTIAL METHOD BLD: ABNORMAL
EOSINOPHIL # BLD: 0.1 K/UL (ref 0–0.4)
EOSINOPHIL NFR BLD: 3 % (ref 0–7)
ERYTHROCYTE [DISTWIDTH] IN BLOOD BY AUTOMATED COUNT: 13.8 % (ref 11.5–14.5)
GLOBULIN SER CALC-MCNC: 6.3 G/DL (ref 2–4)
GLUCOSE SERPL-MCNC: 104 MG/DL (ref 65–100)
HCT VFR BLD AUTO: 34 % (ref 35–47)
HGB BLD-MCNC: 11.3 G/DL (ref 11.5–16)
IMM GRANULOCYTES # BLD: 0 K/UL (ref 0–0.04)
IMM GRANULOCYTES NFR BLD AUTO: 0 % (ref 0–0.5)
LYMPHOCYTES # BLD: 1.7 K/UL (ref 0.8–3.5)
LYMPHOCYTES NFR BLD: 38 % (ref 12–49)
MCH RBC QN AUTO: 31 PG (ref 26–34)
MCHC RBC AUTO-ENTMCNC: 33.2 G/DL (ref 30–36.5)
MCV RBC AUTO: 93.2 FL (ref 80–99)
MONOCYTES # BLD: 0.3 K/UL (ref 0–1)
MONOCYTES NFR BLD: 6 % (ref 5–13)
NEUTS SEG # BLD: 2.4 K/UL (ref 1.8–8)
NEUTS SEG NFR BLD: 53 % (ref 32–75)
NRBC # BLD: 0 K/UL (ref 0–0.01)
NRBC BLD-RTO: 0 PER 100 WBC
PLATELET # BLD AUTO: 152 K/UL (ref 150–400)
PMV BLD AUTO: 11.2 FL (ref 8.9–12.9)
POTASSIUM SERPL-SCNC: 3.6 MMOL/L (ref 3.5–5.1)
PROT SERPL-MCNC: 9.7 G/DL (ref 6.4–8.2)
RBC # BLD AUTO: 3.65 M/UL (ref 3.8–5.2)
SAMPLES BEING HELD,HOLD: NORMAL
SODIUM SERPL-SCNC: 139 MMOL/L (ref 136–145)
TROPONIN I SERPL-MCNC: <0.05 NG/ML
WBC # BLD AUTO: 4.5 K/UL (ref 3.6–11)

## 2018-10-13 PROCEDURE — 36415 COLL VENOUS BLD VENIPUNCTURE: CPT | Performed by: EMERGENCY MEDICINE

## 2018-10-13 PROCEDURE — 84484 ASSAY OF TROPONIN QUANT: CPT | Performed by: EMERGENCY MEDICINE

## 2018-10-13 PROCEDURE — 74011250637 HC RX REV CODE- 250/637: Performed by: EMERGENCY MEDICINE

## 2018-10-13 PROCEDURE — 99284 EMERGENCY DEPT VISIT MOD MDM: CPT

## 2018-10-13 PROCEDURE — 85025 COMPLETE CBC W/AUTO DIFF WBC: CPT | Performed by: EMERGENCY MEDICINE

## 2018-10-13 PROCEDURE — 80053 COMPREHEN METABOLIC PANEL: CPT | Performed by: EMERGENCY MEDICINE

## 2018-10-13 PROCEDURE — 93005 ELECTROCARDIOGRAM TRACING: CPT

## 2018-10-13 RX ORDER — CLONIDINE HYDROCHLORIDE 0.1 MG/1
0.2 TABLET ORAL
Status: COMPLETED | OUTPATIENT
Start: 2018-10-13 | End: 2018-10-13

## 2018-10-13 RX ORDER — CLONIDINE HYDROCHLORIDE 0.1 MG/1
0.1 TABLET ORAL 2 TIMES DAILY
Qty: 20 TAB | Refills: 0 | Status: SHIPPED | OUTPATIENT
Start: 2018-10-13 | End: 2022-08-09

## 2018-10-13 RX ADMIN — CLONIDINE HYDROCHLORIDE 0.2 MG: 0.1 TABLET ORAL at 13:56

## 2018-10-13 NOTE — ED PROVIDER NOTES
Patient is a [de-identified] y.o. female presenting with fatigue. Fatigue      The patient is an 57-year-old white female with a history of high blood pressure and multiple myeloma who was seen here last night with accelerated hypertension and given one dose of labetalol. She was offered admission but declined at that time. She had a full workup including CT scan laboratory studies etc. which were simply normal. Since she went home her blood pressure is still remained in elevated at approximately 190/90. She has some vague mild symptoms of feeling recall over but no focal neurologic deficits. She is very anxious. Past Medical History:   Diagnosis Date    Anemia     [facility] disease ([facility] [address]     Cancer ([facility] [address] 2015    Mass on lung.  Hx of mammogram 03/08/2016    normal    Hypertension     Monoclonal gammopathies     Pap smear for cervical cancer screening 04/25/2013    negative    Stroke [facility])     Unsure of date. Years ago. Past Surgical History:   Procedure Laterality Date    BREAST SURGERY PROCEDURE UNLISTED  1982    LEFT breast lump    HX HEENT      Lens implants    HX HEMORRHOIDECTOMY  1989    HX OTHER SURGICAL Right 09/2015    right upper lobe of lung removed due to cancer    HX TONSILLECTOMY  1962    HX TUBAL LIGATION  1969    HX WRIST FRACTURE [address]    \"cyst in wrist\"         Family History:   Problem Relation Age of Onset    Heart Disease Father     Heart Disease Sister     Diabetes Sister     Hypertension Sister     Heart Disease Brother     Heart Disease Sister     Diabetes Sister     Hypertension Sister     Heart Disease Sister     Diabetes Sister     Hypertension Sister     Hypertension Sister     Hypertension Sister     Hypertension Sister     Hypertension Brother     Hypertension Brother        Social History     Social History    Marital status:      Spouse name: N/A    Number of children: N/A    Years of education: N/A     Occupational History    Not on file. Social History Main Topics    Smoking status: Never Smoker    Smokeless tobacco: Never Used    Alcohol use Yes      Comment: social    Drug use: No    Sexual activity: No     Other Topics Concern    Not on file     Social History Narrative         ALLERGIES: Codeine; Corticosteroids (glucocorticoids); and Other medication    Review of Systems   Constitutional: Positive for fatigue. All other systems reviewed and are negative. Vitals:    10/13/18 1253   BP: (!) 243/103   Pulse: 63   Resp: 16   Temp: 97.8 °F (36.6 °C)   SpO2: 96%   Weight: 64.9 kg (143 lb)   Height: 5' 3\" (1.6 m)            Physical Exam   Constitutional: She is oriented to person, place, and time. She appears well-developed and well-nourished. HENT:   Head: Normocephalic and atraumatic. Mouth/Throat: Oropharynx is clear and moist. No oropharyngeal exudate. Eyes: Conjunctivae are normal. No scleral icterus. Neck: Neck supple. No thyromegaly present. Cardiovascular: Normal rate, regular rhythm and normal heart sounds. Exam reveals no gallop and no friction rub. No murmur heard. Pulmonary/Chest: Effort normal and breath sounds normal. No stridor. No respiratory distress. She has no wheezes. She has no rales. Abdominal: Soft. Bowel sounds are normal. There is no tenderness. There is no rebound and no guarding. Musculoskeletal: Normal range of motion. Lymphadenopathy:     She has no cervical adenopathy. Neurological: She is alert and oriented to person, place, and time. Skin: Skin is warm and dry. Psychiatric: She has a normal mood and affect. Nursing note and vitals reviewed. [facility]      ED Course       Procedures    Assessment and plan    the patient's blood pressure was elevated in the emergency department after 0.2 clonidine came down rapidly.  Laboratory studies were unchanged I will discharge her home with close followup by her PCP

## 2018-10-14 LAB
ATRIAL RATE: 60 BPM
ATRIAL RATE: 64 BPM
CALCULATED P AXIS, ECG09: 57 DEGREES
CALCULATED P AXIS, ECG09: 62 DEGREES
CALCULATED R AXIS, ECG10: 3 DEGREES
CALCULATED R AXIS, ECG10: 8 DEGREES
CALCULATED T AXIS, ECG11: 10 DEGREES
CALCULATED T AXIS, ECG11: 4 DEGREES
DIAGNOSIS, 93000: NORMAL
DIAGNOSIS, 93000: NORMAL
P-R INTERVAL, ECG05: 172 MS
P-R INTERVAL, ECG05: 174 MS
Q-T INTERVAL, ECG07: 418 MS
Q-T INTERVAL, ECG07: 432 MS
QRS DURATION, ECG06: 86 MS
QRS DURATION, ECG06: 94 MS
QTC CALCULATION (BEZET), ECG08: 431 MS
QTC CALCULATION (BEZET), ECG08: 432 MS
VENTRICULAR RATE, ECG03: 60 BPM
VENTRICULAR RATE, ECG03: 64 BPM

## 2018-11-13 ENCOUNTER — TELEPHONE (OUTPATIENT)
Dept: ONCOLOGY | Age: 78
End: 2018-11-13

## 2018-11-13 NOTE — TELEPHONE ENCOUNTER
Patient called and stated that she has an appointment on 11/19 that she needs to cancel due to her being out of town until 12/3. Patient stated that she will call when she is back in town to reschedule.

## 2019-04-15 ENCOUNTER — TELEPHONE (OUTPATIENT)
Dept: ONCOLOGY | Age: 79
End: 2019-04-15

## 2019-04-15 DIAGNOSIS — D47.2 SMOLDERING MULTIPLE MYELOMA: ICD-10-CM

## 2019-04-15 DIAGNOSIS — D47.2 SMOLDERING MULTIPLE MYELOMA: Primary | ICD-10-CM

## 2019-04-15 NOTE — TELEPHONE ENCOUNTER
[address] at [facility]  [phone]    04/15/19 9:25 AM Spoke with patient. States nausea and \"burning\" sensation in her bones has worsened over the weekend. States that burning feeling is from the waist down, describes the sensation as feeling like an abrasion and is painful enough to wake patient up at night. Patient is awaiting shipment of Ambien refill. Otherwise, has not taken any medications for above symptoms. Patient last seen in clinic 08/2018 and has been seeing provider at [facility]. Last saw provider in 03/2019 and was told that her protein levels and free light chains were elevated but that patient did not need treatment. Patient says that she was advised no follow up was needed, but she is participating in a a research program with [facility]. Advised that I would defer to Dr. Alize Diaz regarding follow up and call patient back. Patient verbalized understanding. 1:10 PM Called patient. Scheduled appointment for 04/18 at 1:30 per Dr. Alize Diaz and Arnold Mejia. Faxed lab order to [facility] at [facility] per patient request. Patient aware to have this done prior to appointment and that the results will not be available at time of office visit. Requested records from [facility] as well. No further questions or concerns at this time.

## 2019-04-16 ENCOUNTER — HOSPITAL ENCOUNTER (OUTPATIENT)
Dept: LAB | Age: 79
Discharge: HOME | End: 2019-04-16
Payer: MEDICARE

## 2019-04-16 PROCEDURE — 84165 PROTEIN E-PHORESIS SERUM: CPT

## 2019-04-16 PROCEDURE — 85025 COMPLETE CBC W/AUTO DIFF WBC: CPT

## 2019-04-16 PROCEDURE — 36415 COLL VENOUS BLD VENIPUNCTURE: CPT

## 2019-04-16 PROCEDURE — 80053 COMPREHEN METABOLIC PANEL: CPT

## 2019-04-17 NOTE — PROGRESS NOTES
[id number] [facility] at [address] [place] [address]  [phone]    Hematology / Oncology Established Visit    Reason for Visit:   Bhavesh Tarango is a 78 y.o. female who is seen in consultation at the request of Dr. Rohit Pierson for evaluation of multiple myeloma. Hematology Oncology Treatment History:     Diagnosis: Multiple myeloma    Stage: Pending    Pathology:   - 2/4/1998 Bone marrow biopsy: Normocellular marrow, no pathologic diagnosis. (No evidence of plasmacytosis). - 8/6/18 Bone marrow biopsy: Variably hypercellular marrow with monoclonal kappa restricted plasmacytosis, 40-50%. Trilineage hematopoiesis. Comment: The bone marrow is variably hypercellular for age ranging 20% to 70%. Monoclonal, kappa restricted plasmacytosis (40-50%) is identified. No atypical lymphocytic infiltrates are present. Trilineage hematopoiesis with maturation is present in the background. Clinical history indicates MGUS in 1990s with negative bone marrow in 1998 without plasmacytosis. Laboratory data indicate monoclonal IgG kappa of 3.6 g/dL with an increase in serum free kappa/lambda ratio of 37.9 (8/2/2018). Overall findings may represent asymptomatic (smoldering) plasma cell myeloma in the absence of myeloma-defining events. Correlation with clinical, laboratory and radiologic findings including MRI (> 1 focal lesion) is advised to determine evidence of end organ damage. Cytogenetics: 46, XX[20], normal  FISH: Del(13q), IGH rearrangement, t(14;20) detected    Prior Treatment: None    Current Treatment: pending    History of Present Illness:   Ms. Jonas Martel is an [de-identified] y/o female being followed for MGUS vs Multiple Myeloma at [address] [place] [address] who comes in for a second opinion. She had been Dr. Priti Garcia and more recently saw Dr. Rohit Pierson at [address] [place] [address]. 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  k/l ratio stable at 33.  In 2017, she was found to be pancytopenic after episode of Ness Export Syndrome, unknown trigger. Normocytic anemia in 2017 resolved. She also has B12 deficiency and has been on injections for 3-4 months. PMHX: HTN, HLD, SHA, s/p BTL, s/p tonsillectomy. SHx: Currently  to  [facility]. Her former  Amee Heath passed away from Multiple Myeloma, has 3 children. A[facility] Heath age 46, [facility] 52 lives in [place], [place], Amy age 43 lives in [place], [place]. Pt is retired. No smoking or EtOH. -Labs 4/2014: M-spike 2.8  -Labs 6/10/18: M-spike 3.4, CRP 0.8, ABEBA negative  -Labs 5/29/18: WBC 4.6, Hgb 11.2, MCV 94, , TSH 2.59, Hgb A1c 5.7, BUN 16, Cr 1.1  3/4/2019 Labs from [facility]: IgG 4496, IgA 10, IgM 13, Free [place] .21, Free Lambda LC 7.15, K/L ratio: 29.26, crt 1.03, BUN 15, crt clearance 38.7 Ml/min, , M-spike 4.1   SPE path interp: The follow-up serum protein electrophoretic pattern shows the three M-protein peaks are still present. Taken together, they suggest borderline increase when compared to previous values of 2.4, 1.1, and 0.4 gm/100 ml on 11/19/18. Interval history: Today, patient is here for follow up her smoldering MM. Patient states she went to see Dr. Adalid Allred at [facility] for a second opinion, first in 9/2018. She was enrolled in a research program for smoldering myeloma. She underwent bilateral bone marrow biopsies as well as PET scan in 9/2018. Pt reports no bone lesions found. She reports a burning sensation in her bilateral gluteal region. States that burning feeling is from the waist down, describes the sensation as feeling like an abrasion and is painful enough to wake patient up at night. She took Advil 600mg which didn't help. She took Tramadol, which helped but caused drowsiness. Past Medical History:   Diagnosis Date    Anemia     [facility]     Cancer ([facility] [facility] 2015    Mass on lung.     Hx of mammogram 03/08/2016    normal    Hypertension     Monoclonal gammopathies     Pap smear for cervical cancer screening 04/25/2013    negative    Stroke [facility])     Unsure of date. Years ago. Past Surgical History:   Procedure Laterality Date    BREAST SURGERY PROCEDURE UNLISTED  1982    LEFT breast lump    HX HEENT      Lens implants    HX HEMORRHOIDECTOMY  1989    HX OTHER SURGICAL Right 09/2015    right upper lobe of lung removed due to cancer    HX TONSILLECTOMY  1962    HX TUBAL LIGATION  1969    HX WRIST [address]RACTURE [address]    \"cyst in wrist\"      Social History     Tobacco Use    Smoking status: Never Smoker    Smokeless tobacco: Never Used   Substance Use Topics    Alcohol use: Yes     Comment: social   Lived in [facility] for 2.5 yrs. Family History   Problem Relation Age of Onset    Heart Disease Father     Heart Disease Sister     Diabetes Sister     Hypertension Sister     Heart Disease Brother     Heart Disease Sister     Diabetes Sister     Hypertension Sister     Heart Disease Sister     Diabetes Sister     Hypertension Sister     Hypertension Sister     Hypertension Sister     Hypertension Sister     Hypertension Brother     Hypertension Brother      Current Outpatient Medications   Medication Sig    cloNIDine HCl (CATAPRES) 0.1 mg tablet Take 1 Tab by mouth two (2) times a day.  ibuprofen (MOTRIN) 200 mg tablet Take 200 mg by mouth as needed for Pain.  metoprolol succinate (TOPROL-XL) 50 mg XL tablet Take  by mouth daily.  losartan (COZAAR) 50 mg tablet Take  by mouth daily.  zolpidem (AMBIEN) 10 mg tablet TK 1/2 A T PO QHS PRN    cholecalciferol, vitamin D3, (VITAMIN D3 PO) Take  by mouth.  escitalopram oxalate (LEXAPRO) 10 mg tablet TK 1 T PO QD    scopolamine (TRANSDERM-SCOP) 1 mg over 3 days pt3d APPLY PATCH TRANSDERMALLY AS DIRECTED Q 3 DAYS    cyanocobalamin (VITAMIN B12) 1,000 mcg/mL injection 1,000 mcg by IntraMUSCular route once.     cyanocobalamin (VITAMIN B12) 1,000 mcg/mL injection 1 mL by IntraMUSCular route every thirty (30) days.  Syringe, Disposable, 3 mL syrg 1 Each by Does Not Apply route every thirty (30) days.  Safety [place] 25 x 5/8 \" ndle 1 Each by Does Not Apply route every thirty (30) days.  vitamin e (E GEMS) 1,000 unit capsule Take 1,000 Units by mouth daily.  aspirin 81 mg tablet Take 81 mg by mouth. No current facility-administered medications for this visit. Allergies   Allergen Reactions    Codeine Palpitations     Heart races    Corticosteroids (Glucocorticoids) Other (comments)     \"skin turns red, extremely tired\"    Other Medication Other (comments)     Steroids- \"skin turns red, extremely tired\"        Review of Systems: A complete review of systems was obtained, negative except as described above. Physical Exam:     Visit Vitals  /77   Pulse 65   Temp 97 °F (36.1 °C) (Oral)   Resp 16   Ht 5' 3\" (1.6 m)   Wt 147 lb (66.7 kg)   SpO2 98%   BMI 26.04 kg/m²     ECOG PS: 0-1  General: Well developed, no acute distress  Eyes: PERRLA, EOMI, anicteric sclerae  HENT: Atraumatic, OP clear, TMs intact without erythema  Neck: Supple  Lymphatic: No cervical, supraclavicular, axillary or inguinal adenopathy  Respiratory: CTAB, normal respiratory effort  CV: Normal rate, regular rhythm, no murmurs, no peripheral edema  GI: Soft, nontender, nondistended, no masses, no hepatomegaly, no splenomegaly  MS: Normal gait and station. Digits without clubbing or cyanosis. Skin: No rashes, ecchymoses, or petechiae. Normal temperature, turgor, and texture. Neuro/Psych: Alert, oriented. 5/5 strength in all 4 extremities. Appropriate affect, normal judgment/insight. Results:     Lab Results   Component Value Date/Time    WBC 4.5 10/13/2018 01:12 PM    HGB 11.3 (L) 10/13/2018 01:12 PM    HCT 34.0 (L) 10/13/2018 01:12 PM    PLATELET 002 07/53/4381 01:12 PM    MCV 93.2 10/13/2018 01:12 PM    ABS.  NEUTROPHILS 2.4 10/13/2018 01:12 PM    Hemoglobin (POC) 11.2 (L) 2015 08:26 AM    Hematocrit (POC) 33 (L) 2015 08:26 AM     Lab Results   Component Value Date/Time    Sodium 139 10/13/2018 01:12 PM    Potassium 3.6 10/13/2018 01:12 PM    Chloride 105 10/13/2018 01:12 PM    CO2 26 10/13/2018 01:12 PM    Glucose 104 (H) 10/13/2018 01:12 PM    BUN 9 10/13/2018 01:12 PM    Creatinine 1.07 (H) 10/13/2018 01:12 PM    GFR est AA 60 (L) 10/13/2018 01:12 PM    GFR est non-AA 49 (L) 10/13/2018 01:12 PM    Calcium 8.8 10/13/2018 01:12 PM    Sodium (POC) 142 2015 08:26 AM    Potassium (POC) 3.8 2015 08:26 AM    Chloride (POC) 103 2015 08:26 AM    Glucose (POC) 114 (H) 2015 07:16 AM    BUN (POC) 18 2015 08:26 AM    Creatinine (POC) 1.1 2015 08:26 AM    Calcium, ionized (POC) 1.29 2015 08:26 AM     Lab Results   Component Value Date/Time    Bilirubin, total 0.5 10/13/2018 01:12 PM    ALT (SGPT) 18 10/13/2018 01:12 PM    AST (SGOT) 17 10/13/2018 01:12 PM    Alk. phosphatase 61 10/13/2018 01:12 PM    Protein, total 9.7 (H) 10/13/2018 01:12 PM    Albumin 3.4 (L) 10/13/2018 01:12 PM    Globulin 6.3 (H) 10/13/2018 01:12 PM     Lab Results   Component Value Date/Time    Iron 88 2018 11:11 AM    TIBC 291 2018 11:11 AM    Iron % saturation 30 2018 11:11 AM    Ferritin 115 2018 11:11 AM     18: IgG kappa M protein 3.6, free kappa , lambda LC 7, k/l ratio 37. IgG 4104, IgA 13, IgM 16,  B2M 2.6  3/4/2019 Labs from [facility]: IgG 4496, IgA 10, IgM 13, Free [place] .21, Free Lambda LC 7.15, K/L ratio: 29.26, crt 1.03, BUN 15, crt clearance 38.7 Ml/min,   SPE path interp: Three M-protein peaks are still present. Taken together, they suggest borderline increase when compared to previous values of 2.4, 1.1, and 0.4 gm/100 ml on 18. Imagin/31/18 Bone survey:  IMPRESSION:  1.  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 Attention on follow-up is  recommended. 2. No other suspicious lucent lesions. PET 9/2018 ([facility]:  1.  There is no evidence of metabolically active myeloma. Metabolic activity in the bilateral posterior iliac bones consistent with recent biopsy. 2.  Non-FDG avid right pleural nodule and FDG avid right hilar activity adjacent to surgical material. These findings may represent post therapy changes from previously treated lung cancer. Recommend attention on follow-up imaging. 3.  Photopenic defect in the right parietal region may represent previous injury. Please correlate with history of possible CVA. Assessment & Plan:   Heena Juárez is a 78 y.o. female with HTN and remote h/o lung cancer comes in for evaluation and management of multiple myeloma. 1. 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 Skeletal survey negative and no evidence of end organ dysfunction. I spent a long time discussing the difference between MGUS, smoldering myeloma and active multiple myeloma. I discussed that while her  received VRd regimen, which is the initial standard of care treatment, this is not absolute. If she requires treatment and understands the risks/benefits, it is reasonable to start her on a doublet regimen instead of triplet, understanding that she values quality of life over progression free survival. I also discussed that we need further evaluation to determine whether she has any evidence of end organ dysfunction (CRAB criteria).  In smoldering myeloma, the factors associated with an increased risk of progression include: an abnormal free light chain ratio (<0.125 or >8.0), bone marrow clonal plasma cells 50 to 60 percent, a serum M protein ?3 g/dL (or progressive increase in M protein level), IgA SMM, immunoparesis, high risk genetic features, increased circulating plasma cells, abnormal plasma cell immunophenotype, and abnormalities on imaging (magnetic resonance imaging [MRI] or positron emission tomography/computed tomography [PET/CT]). Patient has 3 of these high risk features. Nonetheless, I only only recommend treatment for active rather than smoldering myeloma. I do recommend MRI imaging to rule out occult lytic lesions that may have been missed on skeletal survey. I also recommend labs and close follow up every 3 months. Patient has been followed my Dr. Ro Mota at [facility], according to his last progress note, the M protein slightly increased to 4.1. He advised continued observation and follow up per research protocol in 6 months. -- Labs now - will call  Pt with results  -- f/u in 3 months with labs and MD visit. After July, will try to stagger visits every 6 months here with her q6mo visits at [facility]. 2. H/o right lung cancer: Stage I [T2N0] s/p lobectomy by Dr. Jairo Priest in 2015. Last surveillance CXR was in 6/2016.     3. HTN: On Metoprolol, Nifedipine, Clonidine. Was taken off of Losartan. Takes ASA 81mg/d. Uncontrolled today, but pt better than prior. Will continue to monitor. 4. Vitamin B12 deficiency: Diagnosed in early 2018, on monthly B12 injections. Sent VitB12 injections, syringes, needles to her phamarcy. -- Continue B12 injections monthly. 5. Neuropathic pain: Suggests Gabapentin, but pt states she only has severe pain once in a while. Pt requests Tramadol for use as needed sparingly. I appreciate the opportunity to participate in Ms. Sasha Yusuf's care.     Signed By: Alma Bauer MD     April 17, 2019

## 2019-04-18 ENCOUNTER — OFFICE VISIT (OUTPATIENT)
Dept: ONCOLOGY | Age: 79
End: 2019-04-18

## 2019-04-18 VITALS
HEART RATE: 65 BPM | RESPIRATION RATE: 16 BRPM | HEIGHT: 63 IN | BODY MASS INDEX: 26.05 KG/M2 | DIASTOLIC BLOOD PRESSURE: 77 MMHG | SYSTOLIC BLOOD PRESSURE: 174 MMHG | TEMPERATURE: 97 F | OXYGEN SATURATION: 98 % | WEIGHT: 147 LBS

## 2019-04-18 DIAGNOSIS — M79.2 NEUROPATHIC PAIN: ICD-10-CM

## 2019-04-18 DIAGNOSIS — I10 ESSENTIAL HYPERTENSION: ICD-10-CM

## 2019-04-18 DIAGNOSIS — D47.2 SMOLDERING MULTIPLE MYELOMA: Primary | ICD-10-CM

## 2019-04-18 DIAGNOSIS — D51.8 VITAMIN B12 DEFICIENCY (DIETARY) ANEMIA: ICD-10-CM

## 2019-04-18 DIAGNOSIS — C90.00 MULTIPLE MYELOMA NOT HAVING ACHIEVED REMISSION (HCC): ICD-10-CM

## 2019-04-18 RX ORDER — NIFEDIPINE 30 MG/1
TABLET, FILM COATED, EXTENDED RELEASE ORAL DAILY
COMMUNITY
End: 2022-08-09

## 2019-04-18 RX ORDER — TRAMADOL HYDROCHLORIDE 50 MG/1
25 TABLET ORAL
Qty: 30 TAB | Refills: 0 | Status: SHIPPED | OUTPATIENT
Start: 2019-04-18 | End: 2019-04-21

## 2019-04-18 NOTE — PROGRESS NOTES
Bradley Fragoso is a 78 y.o. female here today for follow up of multiple myeloma. States over the weekend, she had a \"burning sensation all over body\". This lasted a couple days. Feels it improved with activity but once she sat down to rest, it worsened again. States is not certain if related to her medication. She has numb sensation to left side of head at times.

## 2019-04-19 LAB
ALBUMIN SERPL ELPH-MCNC: 3.7 G/DL (ref 2.9–4.4)
ALBUMIN SERPL-MCNC: 3.8 G/DL (ref 3.5–4.7)
ALBUMIN/GLOB SERPL: 0.7 {RATIO} (ref 0.7–1.7)
ALBUMIN/GLOB SERPL: 0.7 {RATIO} (ref 1.2–2.2)
ALP SERPL-CCNC: 51 IU/L (ref 39–117)
ALPHA1 GLOB SERPL ELPH-MCNC: 0.3 G/DL (ref 0–0.4)
ALPHA2 GLOB SERPL ELPH-MCNC: 0.8 G/DL (ref 0.4–1)
ALT SERPL-CCNC: 7 IU/L (ref 0–32)
AST SERPL-CCNC: 13 IU/L (ref 0–40)
B-GLOBULIN SERPL ELPH-MCNC: 1 G/DL (ref 0.7–1.3)
BASOPHILS # BLD AUTO: 0 X10E3/UL (ref 0–0.2)
BASOPHILS NFR BLD AUTO: 0 %
BILIRUB SERPL-MCNC: 0.4 MG/DL (ref 0–1.2)
BUN SERPL-MCNC: 15 MG/DL (ref 8–27)
BUN/CREAT SERPL: 15 (ref 12–28)
CALCIUM SERPL-MCNC: 9.3 MG/DL (ref 8.7–10.3)
CHLORIDE SERPL-SCNC: 101 MMOL/L (ref 96–106)
CO2 SERPL-SCNC: 24 MMOL/L (ref 20–29)
CREAT SERPL-MCNC: 1.03 MG/DL (ref 0.57–1)
EOSINOPHIL # BLD AUTO: 0.3 X10E3/UL (ref 0–0.4)
EOSINOPHIL NFR BLD AUTO: 5 %
ERYTHROCYTE [DISTWIDTH] IN BLOOD BY AUTOMATED COUNT: 15.8 % (ref 12.3–15.4)
GAMMA GLOB SERPL ELPH-MCNC: 3.9 G/DL (ref 0.4–1.8)
GLOBULIN SER CALC-MCNC: 5.8 G/DL (ref 1.5–4.5)
GLOBULIN SER-MCNC: 5.9 G/DL (ref 2.2–3.9)
GLUCOSE SERPL-MCNC: 81 MG/DL (ref 65–99)
HCT VFR BLD AUTO: 32.3 % (ref 34–46.6)
HGB BLD-MCNC: 10.5 G/DL (ref 11.1–15.9)
IGA SERPL-MCNC: 9 MG/DL (ref 64–422)
IGG SERPL-MCNC: 4513 MG/DL (ref 700–1600)
IGM SERPL-MCNC: 12 MG/DL (ref 26–217)
IMM GRANULOCYTES # BLD AUTO: 0 X10E3/UL (ref 0–0.1)
IMM GRANULOCYTES NFR BLD AUTO: 0 %
INTERPRETATION SERPL IEP-IMP: ABNORMAL
KAPPA LC FREE SER-MCNC: 289 MG/L (ref 3.3–19.4)
KAPPA LC FREE/LAMBDA FREE SER: 38.53 {RATIO} (ref 0.26–1.65)
LAMBDA LC FREE SERPL-MCNC: 7.5 MG/L (ref 5.7–26.3)
LYMPHOCYTES # BLD AUTO: 2.2 X10E3/UL (ref 0.7–3.1)
LYMPHOCYTES NFR BLD AUTO: 41 %
M PROTEIN SERPL ELPH-MCNC: 3.7 G/DL
MCH RBC QN AUTO: 30.7 PG (ref 26.6–33)
MCHC RBC AUTO-ENTMCNC: 32.5 G/DL (ref 31.5–35.7)
MCV RBC AUTO: 94 FL (ref 79–97)
MONOCYTES # BLD AUTO: 0.3 X10E3/UL (ref 0.1–0.9)
MONOCYTES NFR BLD AUTO: 6 %
NEUTROPHILS # BLD AUTO: 2.5 X10E3/UL (ref 1.4–7)
NEUTROPHILS NFR BLD AUTO: 48 %
PLATELET # BLD AUTO: 177 X10E3/UL (ref 150–379)
PLEASE NOTE:, 149534: ABNORMAL
POTASSIUM SERPL-SCNC: 4.9 MMOL/L (ref 3.5–5.2)
PROT SERPL-MCNC: 9.6 G/DL (ref 6–8.5)
RBC # BLD AUTO: 3.42 X10E6/UL (ref 3.77–5.28)
SODIUM SERPL-SCNC: 135 MMOL/L (ref 134–144)
WBC # BLD AUTO: 5.3 X10E3/UL (ref 3.4–10.8)

## 2019-05-12 ENCOUNTER — HOSPITAL ENCOUNTER (EMERGENCY)
Age: 79
Discharge: HOME | End: 2019-05-12
Attending: EMERGENCY MEDICINE
Payer: MEDICARE

## 2019-05-12 ENCOUNTER — APPOINTMENT (OUTPATIENT)
Dept: CT IMAGING | Age: 79
End: 2019-05-12
Attending: EMERGENCY MEDICINE
Payer: MEDICARE

## 2019-05-12 VITALS
BODY MASS INDEX: 25.69 KG/M2 | TEMPERATURE: 98.3 F | RESPIRATION RATE: 18 BRPM | HEIGHT: 63 IN | SYSTOLIC BLOOD PRESSURE: 215 MMHG | HEART RATE: 98 BPM | DIASTOLIC BLOOD PRESSURE: 86 MMHG | WEIGHT: 145 LBS | OXYGEN SATURATION: 100 %

## 2019-05-12 DIAGNOSIS — I10 ESSENTIAL HYPERTENSION: ICD-10-CM

## 2019-05-12 DIAGNOSIS — H81.11 BPPV (BENIGN PAROXYSMAL POSITIONAL VERTIGO), RIGHT: Primary | ICD-10-CM

## 2019-05-12 DIAGNOSIS — R00.2 PALPITATIONS: ICD-10-CM

## 2019-05-12 PROCEDURE — 70450 CT HEAD/BRAIN W/O DYE: CPT

## 2019-05-12 PROCEDURE — 74011250636 HC RX REV CODE- 250/636: Performed by: EMERGENCY MEDICINE

## 2019-05-12 PROCEDURE — 74011250637 HC RX REV CODE- 250/637: Performed by: EMERGENCY MEDICINE

## 2019-05-12 PROCEDURE — 93005 ELECTROCARDIOGRAM TRACING: CPT

## 2019-05-12 PROCEDURE — 99285 EMERGENCY DEPT VISIT HI MDM: CPT

## 2019-05-12 RX ORDER — MECLIZINE HYDROCHLORIDE 25 MG/1
25 TABLET ORAL
Qty: 15 TAB | Refills: 0 | Status: SHIPPED | OUTPATIENT
Start: 2019-05-12 | End: 2019-05-22

## 2019-05-12 RX ORDER — ONDANSETRON 4 MG/1
4 TABLET, ORALLY DISINTEGRATING ORAL
Status: COMPLETED | OUTPATIENT
Start: 2019-05-12 | End: 2019-05-12

## 2019-05-12 RX ORDER — DIAZEPAM 5 MG/1
5 TABLET ORAL
Qty: 4 TAB | Refills: 0 | Status: SHIPPED | OUTPATIENT
Start: 2019-05-12 | End: 2022-08-09

## 2019-05-12 RX ORDER — MECLIZINE HYDROCHLORIDE 25 MG/1
50 TABLET ORAL
Status: COMPLETED | OUTPATIENT
Start: 2019-05-12 | End: 2019-05-12

## 2019-05-12 RX ORDER — DIAZEPAM 5 MG/1
5 TABLET ORAL
Status: COMPLETED | OUTPATIENT
Start: 2019-05-12 | End: 2019-05-12

## 2019-05-12 RX ADMIN — MECLIZINE HYDROCHLORIDE 50 MG: 25 TABLET ORAL at 19:23

## 2019-05-12 RX ADMIN — DIAZEPAM 5 MG: 5 TABLET ORAL at 20:29

## 2019-05-12 RX ADMIN — ONDANSETRON 4 MG: 4 TABLET, ORALLY DISINTEGRATING ORAL at 19:23

## 2019-05-12 NOTE — ED PROVIDER NOTES
78 y.o. female with past medical history significant for HTN, monoclonal gammopathies, stroke, autoimmune disease, lung cancer, and anemia who presents from home via private vehicle with chief complaint of dizziness. Patient began with dizziness about 30 minutes ago, and she has felt generally weak, \"shaky\", and nauseated since. She also notes she is \"very thirsty\". Patient is concerned that her BP is elevated. Denies history of vertigo but notes previous episodes of similar symptoms. Specifically denies any other pain or symptoms. There are no other acute medical concerns at this time. Social hx: Never tobacco smoker; Social EtOH use; Denies illicit drug use  PCP: Shaylee Morfin NP    Note written by Quinn Verdin, as dictated by Valeria Fairchild MD 6:51 PM    The history is provided by the patient. No  was used. Past Medical History:   Diagnosis Date    Anemia     [facility] [address] [facility]     Cancer ([facility] [address] [facility] 2015    Mass on lung.  Hx of mammogram 03/08/2016    normal    Hypertension     Monoclonal gammopathies     Pap smear for cervical cancer screening 04/25/2013    negative    Stroke [facility])     Unsure of date. Years ago.        Past Surgical History:   Procedure Laterality Date    BREAST SURGERY PROCEDURE UNLISTED  1982    LEFT breast lump    HX HEENT      Lens implants    HX HEMORRHOIDECTOMY  1989    HX OTHER SURGICAL Right 09/2015    right upper lobe of lung removed due to cancer    HX TONSILLECTOMY  1962    [address]    HX WRIST FRACTURE [address]    \"cyst in wrist\"         Family History:   Problem Relation Age of Onset    Heart Disease Father     Heart Disease Sister     Diabetes Sister     Hypertension Sister     Heart Disease Brother     Heart Disease Sister     Diabetes Sister     Hypertension Sister     Heart Disease Sister     Diabetes Sister     Hypertension Sister     Hypertension Sister     Hypertension Sister    [facility] Hypertension Sister     Hypertension Brother     Hypertension Brother        Social History     Socioeconomic History    Marital status:      Spouse name: Not on file    Number of children: Not on file    Years of education: Not on file    Highest education level: Not on file   Occupational History    Not on file   Social Needs    Financial resource strain: Not on file    Food insecurity:     Worry: Not on file     Inability: Not on file    Transportation needs:     Medical: Not on file     Non-medical: Not on file   Tobacco Use    Smoking status: Never Smoker    Smokeless tobacco: Never Used   Substance and Sexual Activity    Alcohol use: Yes     Comment: social    Drug use: No    Sexual activity: Never   Lifestyle    Physical activity:     Days per week: Not on file     Minutes per session: Not on file    Stress: Not on file   Relationships    Social connections:     Talks on phone: Not on file     Gets together: Not on file     Attends Congregational service: Not on file     Active member of club or organization: Not on file     Attends meetings of clubs or organizations: Not on file     Relationship status: Not on file    Intimate partner violence:     Fear of current or ex partner: Not on file     Emotionally abused: Not on file     Physically abused: Not on file     Forced sexual activity: Not on file   Other Topics Concern    Not on file   Social History Narrative    Not on file         ALLERGIES: Codeine; Corticosteroids (glucocorticoids); and Other medication    Review of Systems   Gastrointestinal: Positive for nausea. Neurological: Positive for dizziness and weakness. All other systems reviewed and are negative.       Vitals:    05/12/19 1945 05/12/19 2000 05/12/19 2015 05/12/19 2030   BP: (!) 201/98 (!) 206/72 (!) 214/86 (!) 215/86   Pulse:       Resp:       Temp:       SpO2: 98% 99% 100% 100%   Weight:       Height:                Physical Exam   Constitutional: She appears well-developed and well-nourished. No distress. HENT:   Head: Normocephalic and atraumatic. Eyes: Conjunctivae are normal. Right eye exhibits nystagmus. Positive right gaze nystagmus 2 beats. Neck: Neck supple. Cardiovascular: Normal rate and regular rhythm. Pulmonary/Chest: Effort normal. No respiratory distress. Abdominal: She exhibits no distension. Musculoskeletal: Normal range of motion. She exhibits no deformity. Neurological: She is alert. No cranial nerve deficit. Positive right head impulse test. Normal finger to nose. Normal rapid alternating movement. Normal heel to shin. Skin: Skin is warm and dry. Psychiatric: Her behavior is normal.   Nursing note and vitals reviewed. Note written by Quinn Moon, as dictated by Maria Del Rosario Esposito MD 6:51 PM    MDM     78-year-old female history of difficult to control hypertension presents with sudden onset of severe rightward vertigo symptoms. Symptoms are reproducible on arrival without signs of cerebellar dysfunction on exam suggesting a peripheral vertigo that is typical in nature. She is treated with supportive care and did not fully resolve her symptoms, due to advanced age risk factors a CT was ordered of the head to rule out acute pathology and we will provide her a dose of Valium to help with what appears to be some associated anxiety and persistent vertigo symptoms. She is developing complaint of palpitations which appeared to resolve after reassurance is provided. EKG reviewed by me is normal sinus rhythm and rate, without evidence of ST or T wave changes to suggest myocardial ischemia, no delta wave, no brugada, no prolonged QT to suggest arrhythmogenicity. We discussed supportive care measures at home and she is in agreement with plan for close outpatient followup with her primary care physician. Procedures     PROGRESS NOTE:  7:58 PM  Patient's vertigo is vastly improved but not resolved.  She feels head has a fullness and she c/o occasional palpitations. She is still well-appearing and BP has come down but she remains hypertensive. EKG: Rate 76, Nonspecific ST abnormality, normal sinus rhythm,No significant change from last tracing.

## 2019-05-12 NOTE — ED TRIAGE NOTES
Patient here with  Hypertension, patient c/o getting dizziness, weakness, shakiness and states \" my face kun up on the left side twice just for a second. MD examined, no code S warranted.

## 2019-05-13 LAB
ATRIAL RATE: 76 BPM
CALCULATED P AXIS, ECG09: 47 DEGREES
CALCULATED R AXIS, ECG10: 10 DEGREES
CALCULATED T AXIS, ECG11: 26 DEGREES
DIAGNOSIS, 93000: NORMAL
P-R INTERVAL, ECG05: 174 MS
Q-T INTERVAL, ECG07: 386 MS
QRS DURATION, ECG06: 94 MS
QTC CALCULATION (BEZET), ECG08: 434 MS
VENTRICULAR RATE, ECG03: 76 BPM

## 2019-05-13 NOTE — ED NOTES
Dr. Giovanni Casper gave and reviewed discharge instructions with the patient and spouse. The patient and spouse verbalized understanding. The patient and spouse was given opportunity for questions. Patient discharged in stable condition to the waiting room via wheelchair with .

## 2019-07-16 ENCOUNTER — HOSPITAL ENCOUNTER (OUTPATIENT)
Dept: LAB | Age: 79
Discharge: HOME | End: 2019-07-16
Payer: MEDICARE

## 2019-07-16 PROCEDURE — 36415 COLL VENOUS BLD VENIPUNCTURE: CPT

## 2019-07-16 PROCEDURE — 80053 COMPREHEN METABOLIC PANEL: CPT

## 2019-07-16 PROCEDURE — 84165 PROTEIN E-PHORESIS SERUM: CPT

## 2019-07-16 PROCEDURE — 85025 COMPLETE CBC W/AUTO DIFF WBC: CPT

## 2019-07-17 NOTE — PROGRESS NOTES
[id number] [facility] at [facility]  [phone]    Hematology / Oncology Established Visit    Reason for Visit:   Aaron Lopez is a 78 y.o. female who is seen in consultation at the request of Dr. Foy Sicard for evaluation of multiple myeloma. Hematology Oncology Treatment History:     Diagnosis: Multiple myeloma    Stage: N/A    Pathology:   - 2/4/1998 Bone marrow biopsy: Normocellular marrow, no pathologic diagnosis. (No evidence of plasmacytosis). - 8/6/18 Bone marrow biopsy: Variably hypercellular marrow with monoclonal kappa restricted plasmacytosis, 40-50%. Trilineage hematopoiesis. Comment: The bone marrow is variably hypercellular for age ranging 20% to 70%. Monoclonal, kappa restricted plasmacytosis (40-50%) is identified. No atypical lymphocytic infiltrates are present. Trilineage hematopoiesis with maturation is present in the background. Clinical history indicates MGUS in 1990s with negative bone marrow in 1998 without plasmacytosis. Laboratory data indicate monoclonal IgG kappa of 3.6 g/dL with an increase in serum free kappa/lambda ratio of 37.9 (8/2/2018). Overall findings may represent asymptomatic (smoldering) plasma cell myeloma in the absence of myeloma-defining events. Correlation with clinical, laboratory and radiologic findings including MRI (> 1 focal lesion) is advised to determine evidence of end organ damage. Cytogenetics: 46, XX[20], normal  FISH: Del(13q), IGH rearrangement, t(14;20) detected    Prior Treatment: None    Current Treatment: Observation    History of Present Illness:   Ms. Gilson Negron is an [de-identified] y/o female being followed for MGUS vs Multiple Myeloma at [address] [place] [address] who comes in for a second opinion. She had been Dr. Michael Liu and more recently saw Dr. Foy Sicard at [address] [place] [address]. 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  k/l ratio stable at 33.  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 Normocytic anemia in 2017 resolved. She also has B12 deficiency and has been on injections for 3-4 months. PMHX: HTN, HLD, SHA, s/p BTL, s/p tonsillectomy. SHx: Currently  to  [facility]. Her former  Leslye Jones passed away from Multiple Myeloma, has 3 children. Les[facility] Jones age 46, [facility] 52 lives in [place], [place], Amy age 43 lives in [place], [place]. Pt is retired. No smoking or EtOH. -Labs 4/2014: M-spike 2.8  -Labs 6/10/18: M-spike 3.4, CRP 0.8, ABEBA negative  -Labs 5/29/18: WBC 4.6, Hgb 11.2, MCV 94, , TSH 2.59, Hgb A1c 5.7, BUN 16, Cr 1.1  3/4/2019 Labs from [facility]: IgG 4496, IgA 10, IgM 13, Free [place] .21, Free Lambda LC 7.15, K/L ratio: 29.26, crt 1.03, BUN 15, crt clearance 38.7 Ml/min, , M-spike 4.1   SPE path interp: The follow-up serum protein electrophoretic pattern shows the three M-protein peaks are still present. Taken together, they suggest borderline increase when compared to previous values of 2.4, 1.1, and 0.4 gm/100 ml on 11/19/18. Interval history: Today, patient is here for follow up her smoldering MM. She reports constipation, fatigue. Her  has liver cancer and is sick. His daughter moved him to a hospice facility in [place]. Patient previously went to see Dr. Adrianne Vogt at [facility] for a second opinion, first in 9/2018. She was enrolled in a research program for smoldering myeloma. She underwent bilateral bone marrow biopsies as well as PET scan in 9/2018. Pt reports no bone lesions found. Past Medical History:   Diagnosis Date    Anemia     Autoimmune disease ([address]     Cancer ([facility] [address] 2015    Mass on lung.  Hx of mammogram 03/08/2016    normal    Hypertension     Monoclonal gammopathies     Pap smear for cervical cancer screening 04/25/2013    negative    Stroke [facility])     Unsure of date. Years ago.       Past Surgical History: Procedure Laterality Date    BREAST SURGERY PROCEDURE UNLISTED  1982    LEFT breast lump    HX HEENT      Lens implants    HX HEMORRHOIDECTOMY  1989    HX OTHER SURGICAL Right 09/2015    right upper lobe of lung removed due to cancer    HX TONSILLECTOMY  1962    HX TUBAL LIGATION  1969    HX WRIST FRACTURE [address]    \"cyst in wrist\"      Social History     Tobacco Use    Smoking status: Never Smoker    Smokeless tobacco: Never Used   Substance Use Topics    Alcohol use: Yes     Comment: social   Lived in [facility] for 2.5 yrs. Family History   Problem Relation Age of Onset    Heart Disease Father     Heart Disease Sister     Diabetes Sister     Hypertension Sister     Heart Disease Brother     Heart Disease Sister     Diabetes Sister     Hypertension Sister     Heart Disease Sister     Diabetes Sister     Hypertension Sister     Hypertension Sister     Hypertension Sister     Hypertension Sister     Hypertension Brother     Hypertension Brother      Current Outpatient Medications   Medication Sig    diazePAM (VALIUM) 5 mg tablet Take 1 Tab by mouth every twelve (12) hours as needed (vertigo). Max Daily Amount: 10 mg.    NIFEdipine ER (ADALAT CC) 30 mg ER tablet Take  by mouth daily.  cloNIDine HCl (CATAPRES) 0.1 mg tablet Take 1 Tab by mouth two (2) times a day.  ibuprofen (MOTRIN) 200 mg tablet Take 200 mg by mouth as needed for Pain.  metoprolol succinate (TOPROL-XL) 50 mg XL tablet Take  by mouth daily.  losartan (COZAAR) 50 mg tablet Take  by mouth daily.  zolpidem (AMBIEN) 10 mg tablet TK 1/2 A T PO QHS PRN    cholecalciferol, vitamin D3, (VITAMIN D3 PO) Take  by mouth.  escitalopram oxalate (LEXAPRO) 10 mg tablet TK 1 T PO QD    scopolamine (TRANSDERM-SCOP) 1 mg over 3 days pt3d APPLY PATCH TRANSDERMALLY AS DIRECTED Q 3 DAYS    cyanocobalamin (VITAMIN B12) 1,000 mcg/mL injection 1,000 mcg by IntraMUSCular route once.     cyanocobalamin (VITAMIN B12) 1,000 mcg/mL injection 1 mL by IntraMUSCular route every thirty (30) days.  Syringe, Disposable, 3 mL syrg 1 Each by Does Not Apply route every thirty (30) days.  Safety [place] 25 x 5/8 \" ndle 1 Each by Does Not Apply route every thirty (30) days.  vitamin e (E GEMS) 1,000 unit capsule Take 1,000 Units by mouth daily.  aspirin 81 mg tablet Take 81 mg by mouth. No current facility-administered medications for this visit. Allergies   Allergen Reactions    Codeine Palpitations     Heart races    Corticosteroids (Glucocorticoids) Other (comments)     \"skin turns red, extremely tired\"    Other Medication Other (comments)     Steroids- \"skin turns red, extremely tired\"        Review of Systems: A complete review of systems was obtained, negative except as described above. Physical Exam:     Visit Vitals  Resp 16   Ht 5' 3\" (1.6 m)   Wt 147 lb (66.7 kg)   BMI 26.04 kg/m²     ECOG PS: 0-1  General: Well developed, no acute distress  Eyes: PERRLA, EOMI, anicteric sclerae  HENT: Atraumatic, OP clear, TMs intact without erythema  Neck: Supple  Lymphatic: No cervical, supraclavicular, axillary or inguinal adenopathy  Respiratory: CTAB, normal respiratory effort  CV: Normal rate, regular rhythm, no murmurs, no peripheral edema  GI: Soft, nontender, nondistended, no masses, no hepatomegaly, no splenomegaly  MS: Normal gait and station. Digits without clubbing or cyanosis. Skin: No rashes, ecchymoses, or petechiae. Normal temperature, turgor, and texture. Neuro/Psych: Alert, oriented. 5/5 strength in all 4 extremities. Appropriate affect, normal judgment/insight. Results:     Lab Results   Component Value Date/Time    WBC 5.4 07/16/2019 02:39 PM    HGB 11.1 07/16/2019 02:39 PM    HCT 34.1 07/16/2019 02:39 PM    PLATELET 037 59/74/0960 02:39 PM    MCV 94 07/16/2019 02:39 PM    ABS.  NEUTROPHILS 3.6 07/16/2019 02:39 PM    Hemoglobin (POC) 11.2 (L) 09/08/2015 08:26 AM    Hematocrit (POC) 33 (L) 09/08/2015 08:26 AM     Lab Results   Component Value Date/Time    Sodium 135 2019 02:39 PM    Potassium 4.2 2019 02:39 PM    Chloride 102 2019 02:39 PM    CO2 18 (L) 2019 02:39 PM    Glucose 130 (H) 2019 02:39 PM    BUN 10 2019 02:39 PM    Creatinine 1.37 (H) 2019 02:39 PM    GFR est AA 42 (L) 2019 02:39 PM    GFR est non-AA 36 (L) 2019 02:39 PM    Calcium 9.4 2019 02:39 PM    Sodium (POC) 142 2015 08:26 AM    Potassium (POC) 3.8 2015 08:26 AM    Chloride (POC) 103 2015 08:26 AM    Glucose (POC) 114 (H) 2015 07:16 AM    BUN (POC) 18 2015 08:26 AM    Creatinine (POC) 1.1 2015 08:26 AM    Calcium, ionized (POC) 1.29 2015 08:26 AM     Lab Results   Component Value Date/Time    Bilirubin, total 0.6 2019 02:39 PM    ALT (SGPT) 8 2019 02:39 PM    AST (SGOT) 22 2019 02:39 PM    Alk. phosphatase 54 2019 02:39 PM    Protein, total 9.6 (H) 2019 02:39 PM    Albumin 4.1 2019 02:39 PM    Globulin 6.3 (H) 10/13/2018 01:12 PM     Lab Results   Component Value Date/Time    Iron 88 2018 11:11 AM    TIBC 291 2018 11:11 AM    Iron % saturation 30 2018 11:11 AM    Ferritin 115 2018 11:11 AM     18: IgG kappa M protein 3.6, free kappa , lambda LC 7, k/l ratio 37. IgG 4104, IgA 13, IgM 16,  B2M 2.6  3/4/2019 Labs from [facility]: IgG 4496, IgA 10, IgM 13, Free [place] .21, Free Lambda LC 7.15, K/L ratio: 29.26, crt 1.03, BUN 15, crt clearance 38.7 Ml/min,   SPE path interp: Three M-protein peaks are still present. Taken together, they suggest borderline increase when compared to previous values of 2.4, 1.1, and 0.4 gm/100 ml on 18.   19: IgG kappa M protein 3.7, free kappa , lambda LC 7.5, k/l ratio 38.5, IgG 4513, IgA 9, IgM 12.  19:  IgG 4419, IgA 9,  IgM 11, m-spike 1.8,  [place] .7, Lambda LC 5.9,  K/L ratio 44.19    Imagin/31/18 Bone survey:  IMPRESSION:  1. 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 Attention on follow-up is  recommended. 2. No other suspicious lucent lesions. PET 9/2018 ([facility]:  1.  There is no evidence of metabolically active myeloma. Metabolic activity in the bilateral posterior iliac bones consistent with recent biopsy. 2.  Non-FDG avid right pleural nodule and FDG avid right hilar activity adjacent to surgical material. These findings may represent post therapy changes from previously treated lung cancer. Recommend attention on follow-up imaging. 3.  Photopenic defect in the right parietal region may represent previous injury. Please correlate with history of possible CVA. Assessment & Plan:   Petra Mcelroy is a 78 y.o. female with HTN and remote h/o lung cancer comes in for evaluation and management of multiple myeloma. 1. 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 Skeletal survey negative and no evidence of end organ dysfunction. I spent a long time discussing the difference between MGUS, smoldering myeloma and active multiple myeloma. I discussed that while her  received VRd regimen, which is the initial standard of care treatment, this is not absolute. If she requires treatment and understands the risks/benefits, it is reasonable to start her on a doublet regimen instead of triplet, understanding that she values quality of life over progression free survival. I also discussed that we need further evaluation to determine whether she has any evidence of end organ dysfunction (CRAB criteria).  In smoldering myeloma, the factors associated with an increased risk of progression include: an abnormal free light chain ratio (<0.125 or >8.0), bone marrow clonal plasma cells 50 to 60 percent, a serum M protein ?3 g/dL (or progressive increase in M protein level), IgA SMM, immunoparesis, high risk genetic features, increased circulating plasma cells, abnormal plasma cell immunophenotype, and abnormalities on imaging (magnetic resonance imaging [MRI] or positron emission tomography/computed tomography [PET/CT]). Patient has 3 of these high risk features. Nonetheless, I only only recommend treatment for active rather than smoldering myeloma. I do recommend MRI imaging to rule out occult lytic lesions that may have been missed on skeletal survey. I also recommend labs and close follow up every 3 months. Patient has been followed my Dr. Yao Bars at [facility], according to his last progress note, the M protein slightly increased to 4.1. He advised continued observation and follow up per research protocol in 6 months. -- f/u in 3 months with labs and MD visit. If patient continues to follow with [facility], will try to stagger visits every 6 months here with her q6mo visits at [facility]. 2. H/o right lung cancer: Stage I [T2N0] s/p lobectomy by Dr. Bessy Hoyos in 2015. Last surveillance CXR was in 6/2016.     3. HTN: On Metoprolol, Nifedipine, Clonidine. Was taken off of Losartan. Takes ASA 81mg/d. Uncontrolled today, but pt better than prior. Will continue to monitor. 4. Vitamin B12 deficiency: Diagnosed in early 2018, on monthly B12 injections. Sent VitB12 injections, syringes, needles to her phamarcy. -- Continue B12 injections monthly. 5. Neuropathic pain: Suggested Gabapentin, but pt states she only has severe pain once in a while. Has used Tramadol for use in past as needed sparingly. 6. Mild renal insufficiency: Not due to myeloma given stability of gammopathy labs. Likely due to decreased PO intake. Advised eating more frequent meals and drinking fluids. I appreciate the opportunity to participate in Ms. Sasha Yusuf's care.     Signed By: Fransico Moncada MD     July 17, 2019

## 2019-07-18 LAB
ALBUMIN SERPL ELPH-MCNC: 3.8 G/DL (ref 2.9–4.4)
ALBUMIN SERPL-MCNC: 4.1 G/DL (ref 3.5–4.7)
ALBUMIN/GLOB SERPL: 0.7 {RATIO} (ref 0.7–1.7)
ALBUMIN/GLOB SERPL: 0.7 {RATIO} (ref 1.2–2.2)
ALP SERPL-CCNC: 54 IU/L (ref 39–117)
ALPHA1 GLOB SERPL ELPH-MCNC: 0.3 G/DL (ref 0–0.4)
ALPHA2 GLOB SERPL ELPH-MCNC: 0.8 G/DL (ref 0.4–1)
ALT SERPL-CCNC: 8 IU/L (ref 0–32)
AST SERPL-CCNC: 22 IU/L (ref 0–40)
B-GLOBULIN SERPL ELPH-MCNC: 1 G/DL (ref 0.7–1.3)
BASOPHILS # BLD AUTO: 0 X10E3/UL (ref 0–0.2)
BASOPHILS NFR BLD AUTO: 0 %
BILIRUB SERPL-MCNC: 0.6 MG/DL (ref 0–1.2)
BUN SERPL-MCNC: 10 MG/DL (ref 8–27)
BUN/CREAT SERPL: 7 (ref 12–28)
CALCIUM SERPL-MCNC: 9.4 MG/DL (ref 8.7–10.3)
CHLORIDE SERPL-SCNC: 102 MMOL/L (ref 96–106)
CO2 SERPL-SCNC: 18 MMOL/L (ref 20–29)
CREAT SERPL-MCNC: 1.37 MG/DL (ref 0.57–1)
EOSINOPHIL # BLD AUTO: 0 X10E3/UL (ref 0–0.4)
EOSINOPHIL NFR BLD AUTO: 1 %
ERYTHROCYTE [DISTWIDTH] IN BLOOD BY AUTOMATED COUNT: 15.7 % (ref 12.3–15.4)
GAMMA GLOB SERPL ELPH-MCNC: 3.8 G/DL (ref 0.4–1.8)
GLOBULIN SER CALC-MCNC: 5.5 G/DL (ref 1.5–4.5)
GLOBULIN SER-MCNC: 5.8 G/DL (ref 2.2–3.9)
GLUCOSE SERPL-MCNC: 130 MG/DL (ref 65–99)
HCT VFR BLD AUTO: 34.1 % (ref 34–46.6)
HGB BLD-MCNC: 11.1 G/DL (ref 11.1–15.9)
IGA SERPL-MCNC: 9 MG/DL (ref 64–422)
IGG SERPL-MCNC: 4419 MG/DL (ref 700–1600)
IGM SERPL-MCNC: 11 MG/DL (ref 26–217)
IMM GRANULOCYTES # BLD AUTO: 0 X10E3/UL (ref 0–0.1)
IMM GRANULOCYTES NFR BLD AUTO: 0 %
INTERPRETATION SERPL IEP-IMP: ABNORMAL
KAPPA LC FREE SER-MCNC: 260.7 MG/L (ref 3.3–19.4)
KAPPA LC FREE/LAMBDA FREE SER: 44.19 {RATIO} (ref 0.26–1.65)
LAMBDA LC FREE SERPL-MCNC: 5.9 MG/L (ref 5.7–26.3)
LYMPHOCYTES # BLD AUTO: 1.5 X10E3/UL (ref 0.7–3.1)
LYMPHOCYTES NFR BLD AUTO: 29 %
M PROTEIN SERPL ELPH-MCNC: 1.8 G/DL
MCH RBC QN AUTO: 30.5 PG (ref 26.6–33)
MCHC RBC AUTO-ENTMCNC: 32.6 G/DL (ref 31.5–35.7)
MCV RBC AUTO: 94 FL (ref 79–97)
MONOCYTES # BLD AUTO: 0.2 X10E3/UL (ref 0.1–0.9)
MONOCYTES NFR BLD AUTO: 3 %
NEUTROPHILS # BLD AUTO: 3.6 X10E3/UL (ref 1.4–7)
NEUTROPHILS NFR BLD AUTO: 67 %
PLATELET # BLD AUTO: 216 X10E3/UL (ref 150–450)
PLEASE NOTE:, 149534: ABNORMAL
POTASSIUM SERPL-SCNC: 4.2 MMOL/L (ref 3.5–5.2)
PROT SERPL-MCNC: 9.6 G/DL (ref 6–8.5)
RBC # BLD AUTO: 3.64 X10E6/UL (ref 3.77–5.28)
SODIUM SERPL-SCNC: 135 MMOL/L (ref 134–144)
WBC # BLD AUTO: 5.4 X10E3/UL (ref 3.4–10.8)

## 2019-07-19 ENCOUNTER — OFFICE VISIT (OUTPATIENT)
Dept: ONCOLOGY | Age: 79
End: 2019-07-19

## 2019-07-19 VITALS
SYSTOLIC BLOOD PRESSURE: 174 MMHG | RESPIRATION RATE: 16 BRPM | DIASTOLIC BLOOD PRESSURE: 78 MMHG | TEMPERATURE: 97.7 F | OXYGEN SATURATION: 97 % | HEART RATE: 77 BPM | HEIGHT: 63 IN | WEIGHT: 147 LBS | BODY MASS INDEX: 26.05 KG/M2

## 2019-07-19 DIAGNOSIS — I10 ESSENTIAL HYPERTENSION: ICD-10-CM

## 2019-07-19 DIAGNOSIS — M79.2 NEUROPATHIC PAIN: ICD-10-CM

## 2019-07-19 DIAGNOSIS — N28.9 RENAL INSUFFICIENCY: ICD-10-CM

## 2019-07-19 DIAGNOSIS — K59.00 CONSTIPATION, UNSPECIFIED CONSTIPATION TYPE: ICD-10-CM

## 2019-07-19 DIAGNOSIS — D47.2 SMOLDERING MULTIPLE MYELOMA: Primary | ICD-10-CM

## 2019-07-19 DIAGNOSIS — D51.8 VITAMIN B12 DEFICIENCY (DIETARY) ANEMIA: ICD-10-CM

## 2019-07-19 RX ORDER — MAGNESIUM CITRATE
296 SOLUTION, ORAL ORAL
Qty: 1 BOTTLE | Refills: 0 | Status: SHIPPED | OUTPATIENT
Start: 2019-07-19 | End: 2019-07-19

## 2019-07-19 RX ORDER — CYANOCOBALAMIN 1000 UG/ML
1000 INJECTION, SOLUTION INTRAMUSCULAR; SUBCUTANEOUS
Qty: 12 VIAL | Refills: 1
Start: 2019-07-19 | End: 2020-02-10

## 2019-07-19 RX ORDER — SYRINGE, DISPOSABLE, 3 ML
1 SYRINGE, EMPTY DISPOSABLE MISCELLANEOUS
Qty: 12 SYRINGE | Refills: 1 | Status: SHIPPED | OUTPATIENT
Start: 2019-07-19 | End: 2022-08-09

## 2019-07-19 NOTE — PROGRESS NOTES
Sascha Alexanderor is a 78 y.o. female here today for follow up of multiple myeloma. She is asking if vitamin B12 injection can be prescribed today.

## 2019-10-07 ENCOUNTER — HOSPITAL ENCOUNTER (OUTPATIENT)
Dept: LAB | Age: 79
Discharge: HOME | End: 2019-10-07
Payer: MEDICARE

## 2019-10-07 DIAGNOSIS — D47.2 SMOLDERING MULTIPLE MYELOMA: ICD-10-CM

## 2019-10-07 PROCEDURE — 85025 COMPLETE CBC W/AUTO DIFF WBC: CPT

## 2019-10-07 PROCEDURE — 84165 PROTEIN E-PHORESIS SERUM: CPT

## 2019-10-07 PROCEDURE — 80053 COMPREHEN METABOLIC PANEL: CPT

## 2019-10-07 PROCEDURE — 36415 COLL VENOUS BLD VENIPUNCTURE: CPT

## 2019-10-08 LAB
ALBUMIN SERPL ELPH-MCNC: 3.8 G/DL (ref 2.9–4.4)
ALBUMIN SERPL-MCNC: 3.9 G/DL (ref 3.5–4.7)
ALBUMIN/GLOB SERPL: 0.8 {RATIO} (ref 0.7–1.7)
ALBUMIN/GLOB SERPL: 0.8 {RATIO} (ref 1.2–2.2)
ALP SERPL-CCNC: 48 IU/L (ref 39–117)
ALPHA1 GLOB SERPL ELPH-MCNC: 0.2 G/DL (ref 0–0.4)
ALPHA2 GLOB SERPL ELPH-MCNC: 0.7 G/DL (ref 0.4–1)
ALT SERPL-CCNC: 12 IU/L (ref 0–32)
AST SERPL-CCNC: 18 IU/L (ref 0–40)
B-GLOBULIN SERPL ELPH-MCNC: 0.9 G/DL (ref 0.7–1.3)
BASOPHILS # BLD AUTO: 0 X10E3/UL (ref 0–0.2)
BASOPHILS NFR BLD AUTO: 1 %
BILIRUB SERPL-MCNC: 0.4 MG/DL (ref 0–1.2)
BUN SERPL-MCNC: 13 MG/DL (ref 8–27)
BUN/CREAT SERPL: 11 (ref 12–28)
CALCIUM SERPL-MCNC: 9.5 MG/DL (ref 8.7–10.3)
CHLORIDE SERPL-SCNC: 103 MMOL/L (ref 96–106)
CO2 SERPL-SCNC: 21 MMOL/L (ref 20–29)
CREAT SERPL-MCNC: 1.21 MG/DL (ref 0.57–1)
EOSINOPHIL # BLD AUTO: 0.2 X10E3/UL (ref 0–0.4)
EOSINOPHIL NFR BLD AUTO: 6 %
ERYTHROCYTE [DISTWIDTH] IN BLOOD BY AUTOMATED COUNT: 14.5 % (ref 12.3–15.4)
GAMMA GLOB SERPL ELPH-MCNC: 3.4 G/DL (ref 0.4–1.8)
GLOBULIN SER CALC-MCNC: 5.1 G/DL (ref 1.5–4.5)
GLOBULIN SER-MCNC: 5.2 G/DL (ref 2.2–3.9)
GLUCOSE SERPL-MCNC: 127 MG/DL (ref 65–99)
HCT VFR BLD AUTO: 35.4 % (ref 34–46.6)
HGB BLD-MCNC: 11.4 G/DL (ref 11.1–15.9)
IGA SERPL-MCNC: 10 MG/DL (ref 64–422)
IGG SERPL-MCNC: 4210 MG/DL (ref 700–1600)
IGM SERPL-MCNC: 15 MG/DL (ref 26–217)
IMM GRANULOCYTES # BLD AUTO: 0 X10E3/UL (ref 0–0.1)
IMM GRANULOCYTES NFR BLD AUTO: 0 %
INTERPRETATION SERPL IEP-IMP: ABNORMAL
KAPPA LC FREE SER-MCNC: 262.1 MG/L (ref 3.3–19.4)
KAPPA LC FREE/LAMBDA FREE SER: 34.95 {RATIO} (ref 0.26–1.65)
LAMBDA LC FREE SERPL-MCNC: 7.5 MG/L (ref 5.7–26.3)
LYMPHOCYTES # BLD AUTO: 1.8 X10E3/UL (ref 0.7–3.1)
LYMPHOCYTES NFR BLD AUTO: 43 %
M PROTEIN SERPL ELPH-MCNC: 3.3 G/DL
MCH RBC QN AUTO: 31.1 PG (ref 26.6–33)
MCHC RBC AUTO-ENTMCNC: 32.2 G/DL (ref 31.5–35.7)
MCV RBC AUTO: 97 FL (ref 79–97)
MONOCYTES # BLD AUTO: 0.2 X10E3/UL (ref 0.1–0.9)
MONOCYTES NFR BLD AUTO: 6 %
NEUTROPHILS # BLD AUTO: 1.9 X10E3/UL (ref 1.4–7)
NEUTROPHILS NFR BLD AUTO: 44 %
PLATELET # BLD AUTO: 199 X10E3/UL (ref 150–450)
PLEASE NOTE:, 149534: ABNORMAL
POTASSIUM SERPL-SCNC: 4.8 MMOL/L (ref 3.5–5.2)
PROT SERPL-MCNC: 9 G/DL (ref 6–8.5)
RBC # BLD AUTO: 3.66 X10E6/UL (ref 3.77–5.28)
SODIUM SERPL-SCNC: 138 MMOL/L (ref 134–144)
WBC # BLD AUTO: 4.2 X10E3/UL (ref 3.4–10.8)

## 2019-10-13 DIAGNOSIS — D51.8 VITAMIN B12 DEFICIENCY (DIETARY) ANEMIA: ICD-10-CM

## 2019-10-13 RX ORDER — CYANOCOBALAMIN 1000 UG/ML
INJECTION, SOLUTION INTRAMUSCULAR; SUBCUTANEOUS
Qty: 12 ML | Refills: 0 | Status: SHIPPED | OUTPATIENT
Start: 2019-10-13 | End: 2019-10-17 | Stop reason: SDUPTHER

## 2019-10-17 ENCOUNTER — OFFICE VISIT (OUTPATIENT)
Dept: ONCOLOGY | Age: 79
End: 2019-10-17

## 2019-10-17 VITALS
OXYGEN SATURATION: 98 % | HEART RATE: 64 BPM | HEIGHT: 63 IN | DIASTOLIC BLOOD PRESSURE: 71 MMHG | WEIGHT: 135 LBS | RESPIRATION RATE: 16 BRPM | SYSTOLIC BLOOD PRESSURE: 147 MMHG | BODY MASS INDEX: 23.92 KG/M2 | TEMPERATURE: 97.4 F

## 2019-10-17 DIAGNOSIS — D47.2 SMOLDERING MULTIPLE MYELOMA: Primary | ICD-10-CM

## 2019-10-17 DIAGNOSIS — K59.00 CONSTIPATION, UNSPECIFIED CONSTIPATION TYPE: ICD-10-CM

## 2019-10-17 DIAGNOSIS — D51.8 VITAMIN B12 DEFICIENCY (DIETARY) ANEMIA: ICD-10-CM

## 2019-10-17 DIAGNOSIS — N28.9 RENAL INSUFFICIENCY: ICD-10-CM

## 2019-10-17 NOTE — PROGRESS NOTES
[id number] [facility] at [facility]  [phone]    Hematology / Oncology Established Visit    Reason for Visit:   Romain Shay is a 78 y.o. female who is seen in consultation at the request of Dr. Breann Duran for evaluation of multiple myeloma. Hematology Oncology Treatment History:     Diagnosis: Multiple myeloma    Stage: N/A    Pathology:   - 2/4/1998 Bone marrow biopsy: Normocellular marrow, no pathologic diagnosis. (No evidence of plasmacytosis). - 8/6/18 Bone marrow biopsy: Variably hypercellular marrow with monoclonal kappa restricted plasmacytosis, 40-50%. Trilineage hematopoiesis. Comment: The bone marrow is variably hypercellular for age ranging 20% to 70%. Monoclonal, kappa restricted plasmacytosis (40-50%) is identified. No atypical lymphocytic infiltrates are present. Trilineage hematopoiesis with maturation is present in the background. Clinical history indicates MGUS in 1990s with negative bone marrow in 1998 without plasmacytosis. Laboratory data indicate monoclonal IgG kappa of 3.6 g/dL with an increase in serum free kappa/lambda ratio of 37.9 (8/2/2018). Overall findings may represent asymptomatic (smoldering) plasma cell myeloma in the absence of myeloma-defining events. Correlation with clinical, laboratory and radiologic findings including MRI (> 1 focal lesion) is advised to determine evidence of end organ damage. Cytogenetics: 46, XX[20], normal  FISH: Del(13q), IGH rearrangement, t(14;20) detected    Prior Treatment: None    Current Treatment: Observation    History of Present Illness:   Ms. Marie Gurrola is an [de-identified] y/o female being followed for MGUS vs Multiple Myeloma at [address] [place] [address] who comes in for a second opinion. She had been Dr. Joey Lee and more recently saw Dr. Breann Duran at [address] [place] [address]. 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  k/l ratio stable at 33.  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 Normocytic anemia in 2017 resolved. She also has B12 deficiency and has been on injections for 3-4 months. PMHX: HTN, HLD, SHA, s/p BTL, s/p tonsillectomy. SHx: Currently  to  [facility]. Her former  Priscilla Mccray passed away from Multiple Myeloma, has 3 children. Priscilla Mccray age 46, live sin [facility] age 52 lives in [place], [place], Amy age 43 lives in [place], [place]. Pt is retired. No smoking or EtOH. -Labs 4/2014: M-spike 2.8  -Labs 6/10/18: M-spike 3.4, CRP 0.8, ABEBA negative  -Labs 5/29/18: WBC 4.6, Hgb 11.2, MCV 94, , TSH 2.59, Hgb A1c 5.7, BUN 16, Cr 1.1  3/4/2019 Labs from [facility]: IgG 4496, IgA 10, IgM 13, Free [place] .21, Free Lambda LC 7.15, K/L ratio: 29.26, crt 1.03, BUN 15, crt clearance 38.7 Ml/min, , M-spike 4.1   SPE path interp: The follow-up serum protein electrophoretic pattern shows the three M-protein peaks are still present. Taken together, they suggest borderline increase when compared to previous values of 2.4, 1.1, and 0.4 gm/100 ml on 11/19/18. Interval history: Today, patient is here for follow up her smoldering MM. She reports ongoing constipation. Reports her energy level has improved. She walks 1 mile a day on her treadmill. Her  has liver cancer and is sick. His daughter moved him to a hospice facility in [place]. Patient previously went to see Dr. Eleonora Rush at [facility] and she does not plan to follow up. She is no longer enrolled in a research program for smoldering myeloma. She underwent bilateral bone marrow biopsies as well as PET scan in 9/2018. Pt reports no bone lesions found. She would like to know how to self-administer B12 injections. Past Medical History:   Diagnosis Date    Anemia     Autoimmune disease ([address]     [facility]    Mass on lung.     Hx of mammogram 03/08/2016    normal    Hypertension     Monoclonal gammopathies  Pap smear for cervical cancer screening 04/25/2013    negative    Stroke [facility])     Unsure of date. Years ago. Past Surgical History:   Procedure Laterality Date    BREAST SURGERY PROCEDURE UNLISTED  1982    LEFT breast lump    HX HEENT      Lens implants    HX HEMORRHOIDECTOMY  1989    HX OTHER SURGICAL Right 09/2015    right upper lobe of lung removed due to cancer    HX TONSILLECTOMY  1962    HX TUBAL LIGATION  1969    HX WRIST FRACTURE [address]    \"cyst in wrist\"      Social History     Tobacco Use    Smoking status: Never Smoker    Smokeless tobacco: Never Used   Substance Use Topics    Alcohol use: Yes     Comment: social   Lived in [facility] for 2.5 yrs. Family History   Problem Relation Age of Onset    Heart Disease Father     Heart Disease Sister     Diabetes Sister     Hypertension Sister     Heart Disease Brother     Heart Disease Sister     Diabetes Sister     Hypertension Sister     Heart Disease Sister     Diabetes Sister     Hypertension Sister     Hypertension Sister     Hypertension Sister     Hypertension Sister     Hypertension Brother     Hypertension Brother      Current Outpatient Medications   Medication Sig    cyanocobalamin (VITAMIN B12) 1,000 mcg/mL injection INJECT 1 ML IN THE MUSCLE EVERY 30 DAYS    Safety [place] 25 x 5/8 \" ndle 1 Each by Does Not Apply route every thirty (30) days.  cyanocobalamin (VITAMIN B12) 1,000 mcg/mL injection 1 mL by IntraMUSCular route every thirty (30) days.  Syringe, Disposable, 3 mL syrg 1 Each by Does Not Apply route every thirty (30) days.  diazePAM (VALIUM) 5 mg tablet Take 1 Tab by mouth every twelve (12) hours as needed (vertigo). Max Daily Amount: 10 mg.    NIFEdipine ER (ADALAT CC) 30 mg ER tablet Take  by mouth daily.  cloNIDine HCl (CATAPRES) 0.1 mg tablet Take 1 Tab by mouth two (2) times a day.  ibuprofen (MOTRIN) 200 mg tablet Take 200 mg by mouth as needed for Pain.     metoprolol succinate (TOPROL-XL) 50 mg XL tablet Take  by mouth daily.  losartan (COZAAR) 50 mg tablet Take  by mouth daily.  zolpidem (AMBIEN) 10 mg tablet TK 1/2 A T PO QHS PRN    cholecalciferol, vitamin D3, (VITAMIN D3 PO) Take  by mouth.  escitalopram oxalate (LEXAPRO) 10 mg tablet TK 1 T PO QD    scopolamine (TRANSDERM-SCOP) 1 mg over 3 days pt3d APPLY PATCH TRANSDERMALLY AS DIRECTED Q 3 DAYS    vitamin e (E GEMS) 1,000 unit capsule Take 1,000 Units by mouth daily.  aspirin 81 mg tablet Take 81 mg by mouth. No current facility-administered medications for this visit. Allergies   Allergen Reactions    Codeine Palpitations     Heart races    Corticosteroids (Glucocorticoids) Other (comments)     \"skin turns red, extremely tired\"    Other Medication Other (comments)     Steroids- \"skin turns red, extremely tired\"        Review of Systems: A complete review of systems was obtained, negative except as described above. Physical Exam:     Visit Vitals  /71   Pulse 64   Temp 97.4 °F (36.3 °C) (Oral)   Resp 16   Ht 5' 3\" (1.6 m)   Wt 135 lb (61.2 kg)   SpO2 98%   BMI 23.91 kg/m²     ECOG PS: 0-1  General: Well developed, no acute distress  Eyes: PERRLA, EOMI, anicteric sclerae  HENT: Atraumatic, OP clear, TMs intact without erythema  Neck: Supple  Lymphatic: No cervical, supraclavicular, axillary or inguinal adenopathy  Respiratory: CTAB, normal respiratory effort  CV: Normal rate, regular rhythm, no murmurs, no peripheral edema  GI: Soft, nontender, nondistended, no masses, no hepatomegaly, no splenomegaly  MS: Normal gait and station. Digits without clubbing or cyanosis. Skin: No rashes, ecchymoses, or petechiae. Normal temperature, turgor, and texture. Neuro/Psych: Alert, oriented. 5/5 strength in all 4 extremities. Appropriate affect, normal judgment/insight.     Results:     Lab Results   Component Value Date/Time    WBC 4.2 10/07/2019 09:46 AM    HGB 11.4 10/07/2019 09:46 AM    HCT 35.4 10/07/2019 09:46 AM    PLATELET 799 38/31/8089 09:46 AM    MCV 97 10/07/2019 09:46 AM    ABS. NEUTROPHILS 1.9 10/07/2019 09:46 AM    Hemoglobin (POC) 11.2 (L) 09/08/2015 08:26 AM    Hematocrit (POC) 33 (L) 09/08/2015 08:26 AM     Lab Results   Component Value Date/Time    Sodium 138 10/07/2019 09:46 AM    Potassium 4.8 10/07/2019 09:46 AM    Chloride 103 10/07/2019 09:46 AM    CO2 21 10/07/2019 09:46 AM    Glucose 127 (H) 10/07/2019 09:46 AM    BUN 13 10/07/2019 09:46 AM    Creatinine 1.21 (H) 10/07/2019 09:46 AM    GFR est AA 48 (L) 10/07/2019 09:46 AM    GFR est non-AA 42 (L) 10/07/2019 09:46 AM    Calcium 9.5 10/07/2019 09:46 AM    Sodium (POC) 142 09/08/2015 08:26 AM    Potassium (POC) 3.8 09/08/2015 08:26 AM    Chloride (POC) 103 09/08/2015 08:26 AM    Glucose (POC) 114 (H) 09/14/2015 07:16 AM    BUN (POC) 18 09/08/2015 08:26 AM    Creatinine (POC) 1.1 09/08/2015 08:26 AM    Calcium, ionized (POC) 1.29 09/08/2015 08:26 AM     Lab Results   Component Value Date/Time    Bilirubin, total 0.4 10/07/2019 09:46 AM    ALT (SGPT) 12 10/07/2019 09:46 AM    AST (SGOT) 18 10/07/2019 09:46 AM    Alk. phosphatase 48 10/07/2019 09:46 AM    Protein, total 9.0 (H) 10/07/2019 09:46 AM    Albumin 3.9 10/07/2019 09:46 AM    Globulin 6.3 (H) 10/13/2018 01:12 PM     Lab Results   Component Value Date/Time    Iron 88 07/31/2018 11:11 AM    TIBC 291 07/31/2018 11:11 AM    Iron % saturation 30 07/31/2018 11:11 AM    Ferritin 115 07/31/2018 11:11 AM     7/30/18: IgG kappa M protein 3.6, free kappa , lambda LC 7, k/l ratio 37. IgG 4104, IgA 13, IgM 16,  B2M 2.6  3/4/2019 Labs from [facility]: IgG 4496, IgA 10, IgM 13, Free [place] .21, Free Lambda LC 7.15, K/L ratio: 29.26, crt 1.03, BUN 15, crt clearance 38.7 Ml/min,   SPE path interp: Three M-protein peaks are still present. Taken together, they suggest borderline increase when compared to previous values of 2.4, 1.1, and 0.4 gm/100 ml on 11/19/18.   4/16/19:  IgG kappa M protein 3.7, free kappa , lambda LC 7.5, k/l ratio 38.5, IgG 4513, IgA 9, IgM 12.  19: IgG 4419, IgA 9,  IgM 11, m-spike 1.8,  [place] .7, Lambda LC 5.9,  K/L ratio 44.19  10/7/19: IgG 4210, IgA 10, IgM 15, m-spike 3.3, Kappa .1, Lambda LC 7.5, K/L ratio 34.95    Imagin/31/18 Bone survey:  IMPRESSION:  1. 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 Attention on follow-up is  recommended. 2. No other suspicious lucent lesions. PET 2018 ([facility]:  1.  There is no evidence of metabolically active myeloma. Metabolic activity in the bilateral posterior iliac bones consistent with recent biopsy. 2.  Non-FDG avid right pleural nodule and FDG avid right hilar activity adjacent to surgical material. These findings may represent post therapy changes from previously treated lung cancer. Recommend attention on follow-up imaging. 3.  Photopenic defect in the right parietal region may represent previous injury. Please correlate with history of possible CVA. Assessment & Plan:   Arlene Monday is a 78 y.o. female with HTN and remote h/o lung cancer comes in for evaluation and management of multiple myeloma. 1. 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 Skeletal survey negative and no evidence of end organ dysfunction. I spent a long time discussing the difference between MGUS, smoldering myeloma and active multiple myeloma. I discussed that while her  received VRd regimen, which is the initial standard of care treatment, this is not absolute.  If she requires treatment and understands the risks/benefits, it is reasonable to start her on a doublet regimen instead of triplet, understanding that she values quality of life over progression free survival. I also discussed that we need further evaluation to determine whether she has any evidence of end organ dysfunction (CRAB criteria). In smoldering myeloma, the factors associated with an increased risk of progression include: an abnormal free light chain ratio (<0.125 or >8.0), bone marrow clonal plasma cells 50 to 60 percent, a serum M protein ?3 g/dL (or progressive increase in M protein level), IgA SMM, immunoparesis, high risk genetic features, increased circulating plasma cells, abnormal plasma cell immunophenotype, and abnormalities on imaging (magnetic resonance imaging [MRI] or positron emission tomography/computed tomography [PET/CT]). Patient has 3 of these high risk features. Nonetheless, I only only recommend treatment for active rather than smoldering myeloma. I do recommend MRI imaging to rule out occult lytic lesions that may have been missed on skeletal survey. I also recommend labs and close follow up every 3 months. Patient has been followed my Dr. Chelsea Chung at [facility], according to his last progress note, the M protein slightly increased to 4.1. He advised continued observation and follow up per research protocol in 6 months. Pt is no longer following up with [facility]. -- f/u in 3 months with labs gammopathy, light chains, CBC and CMP. 2. H/o right lung cancer: Stage I [T2N0] s/p lobectomy by Dr. Felipe Hickey in 2015. Last surveillance CXR was in 6/2016.     3. HTN: On Metoprolol, Nifedipine, Clonidine. Was taken off of Losartan. Takes ASA 81mg/d. Uncontrolled today, but pt better than prior. Will continue to monitor. 4. Vitamin B12 deficiency: Diagnosed in early 2018, on monthly B12 injections. Sent VitB12 injections, syringes, needles to her phamarcy. Patient wants to consider getting B12 monthly in the infusion center. -- Continue B12 injections monthly. -- Pt will return on Monday for B12 injection teaching. 5. Neuropathic pain: Suggested Gabapentin, but pt states she only has severe pain once in a while. Has used Tramadol for use in past as needed sparingly.     6. Mild renal insufficiency: Not due to myeloma given stability of gammopathy labs. Likely due to decreased PO intake. Now improvement 1.37 to 1.21. I appreciate the opportunity to participate in MsImelda Sasha Yusuf's care.     Signed By: Mir Sol MD     October 17, 2019

## 2019-10-21 ENCOUNTER — DOCUMENTATION ONLY (OUTPATIENT)
Dept: ONCOLOGY | Age: 79
End: 2019-10-21

## 2020-01-06 DIAGNOSIS — D47.2 SMOLDERING MULTIPLE MYELOMA: ICD-10-CM

## 2020-01-14 NOTE — PROGRESS NOTES
[id number] [facility] at [facility]  [phone]    Hematology / Oncology Established Visit    Reason for Visit:   Yang Casey is a 78 y.o. female who is seen in consultation at the request of Dr. Mackenzie Lorenzana for evaluation of multiple myeloma. Hematology Oncology Treatment History:     Diagnosis: Multiple myeloma    Stage: N/A    Pathology:   - 2/4/1998 Bone marrow biopsy: Normocellular marrow, no pathologic diagnosis. (No evidence of plasmacytosis). - 8/6/18 Bone marrow biopsy: Variably hypercellular marrow with monoclonal kappa restricted plasmacytosis, 40-50%. Trilineage hematopoiesis. Comment: The bone marrow is variably hypercellular for age ranging 20% to 70%. Monoclonal, kappa restricted plasmacytosis (40-50%) is identified. No atypical lymphocytic infiltrates are present. Trilineage hematopoiesis with maturation is present in the background. Clinical history indicates MGUS in 1990s with negative bone marrow in 1998 without plasmacytosis. Laboratory data indicate monoclonal IgG kappa of 3.6 g/dL with an increase in serum free kappa/lambda ratio of 37.9 (8/2/2018). Overall findings may represent asymptomatic (smoldering) plasma cell myeloma in the absence of myeloma-defining events. Correlation with clinical, laboratory and radiologic findings including MRI (> 1 focal lesion) is advised to determine evidence of end organ damage. Cytogenetics: 46, XX[20], normal  FISH: Del(13q), IGH rearrangement, t(14;20) detected    Prior Treatment: None    Current Treatment: Observation    History of Present Illness:   Ms. Yanira Watson is an [de-identified] y/o female being followed for MGUS vs Multiple Myeloma at [facility] who comes in for a second opinion. She had been Dr. Karthikeyan Ashby and more recently saw Dr. Mackenzie Lorenzana at [facility]. 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  k/l ratio stable at 33.  In 2017, she was found to be pancytopenic after episode of [place] S[place] Syndrome, unknown trigger. Normocytic anemia in 2017 resolved. She also has B12 deficiency and has been on injections for 3-4 months. PMHX: HTN, HLD, SHA, s/p BTL, s/p tonsillectomy. SHx: Currently  to  [place]. Her former  Nuha Corral passed away from Multiple Myeloma, has 3 children. Nuha Corral age 46, live sin Mari Haji age 52 lives in [place], [place], Jasbir Ann age 43 lives in [place], [place]. Pt is retired. No smoking or EtOH. -Labs 4/2014: M-spike 2.8  -Labs 6/10/18: M-spike 3.4, CRP 0.8, ABEBA negative  -Labs 5/29/18: WBC 4.6, Hgb 11.2, MCV 94, , TSH 2.59, Hgb A1c 5.7, BUN 16, Cr 1.1  3/4/2019 Labs from [facility]: IgG 4496, IgA 10, IgM 13, Free [place] .21, Free Lambda LC 7.15, K/L ratio: 29.26, crt 1.03, BUN 15, crt clearance 38.7 Ml/min, , M-spike 4.1   SPE path interp: The follow-up serum protein electrophoretic pattern shows the three M-protein peaks are still present. Taken together, they suggest borderline increase when compared to previous values of 2.4, 1.1, and 0.4 gm/100 ml on 11/19/18. Interval history: Today, patient is here for follow up her smoldering MM. She reports ongoing constipation. She is currently taking Erythromcyin for a URI prescribed by PCP. She used to walk a mile a day on her treadmill, but hasn't done this recently. Her R hip has been bothering. Her  has liver cancer and is sick. His daughter moved him to a hospice facility in [place]. Patient previously went to see Dr. Joo Vergara at [facility] and she does not plan to follow up. She is no longer enrolled in a research program for smoldering myeloma. She underwent bilateral bone marrow biopsies as well as PET scan in 9/2018. Pt reports no bone lesions found. She is taking the monthly B12 injections. Past Medical History:   Diagnosis Date    Anemia     [facility] [address] [facility]     [facility]    Mass on lung.     Hx of mammogram 03/08/2016    normal    Hypertension     Monoclonal gammopathies     Pap smear for cervical cancer screening 04/25/2013    negative    Stroke [facility])     Unsure of date. Years ago. Past Surgical History:   Procedure Laterality Date    BREAST SURGERY PROCEDURE UNLISTED  1982    LEFT breast lump    HX HEENT      Lens implants    HX HEMORRHOIDECTOMY  1989    HX OTHER SURGICAL Right 09/2015    right upper lobe of lung removed due to cancer    HX TONSILLECTOMY  1962    HX TUBAL LIGATION  1969    HX WRIST FRACTURE [address]    \"cyst in wrist\"      Social History     Tobacco Use    Smoking status: Never Smoker    Smokeless tobacco: Never Used   Substance Use Topics    Alcohol use: Yes     Comment: social   Lived in [facility] for 2.5 yrs. Family History   Problem Relation Age of Onset    Heart Disease Father     Heart Disease Sister     Diabetes Sister     Hypertension Sister     Heart Disease Brother     Heart Disease Sister     Diabetes Sister     Hypertension Sister     Heart Disease Sister     Diabetes Sister     Hypertension Sister     Hypertension Sister     Hypertension Sister     Hypertension Sister     Hypertension Brother     Hypertension Brother      Current Outpatient Medications   Medication Sig    Safety [place] 25 x 5/8 \" ndle 1 Each by Does Not Apply route every thirty (30) days.  cyanocobalamin (VITAMIN B12) 1,000 mcg/mL injection 1 mL by IntraMUSCular route every thirty (30) days.  Syringe, Disposable, 3 mL syrg 1 Each by Does Not Apply route every thirty (30) days.  diazePAM (VALIUM) 5 mg tablet Take 1 Tab by mouth every twelve (12) hours as needed (vertigo). Max Daily Amount: 10 mg.    NIFEdipine ER (ADALAT CC) 30 mg ER tablet Take  by mouth daily.  cloNIDine HCl (CATAPRES) 0.1 mg tablet Take 1 Tab by mouth two (2) times a day.  ibuprofen (MOTRIN) 200 mg tablet Take 200 mg by mouth as needed for Pain.     metoprolol succinate (TOPROL-XL) 50 mg XL tablet Take  by mouth daily.  losartan (COZAAR) 50 mg tablet Take  by mouth daily.  zolpidem (AMBIEN) 10 mg tablet TK 1/2 A T PO QHS PRN    cholecalciferol, vitamin D3, (VITAMIN D3 PO) Take  by mouth.  escitalopram oxalate (LEXAPRO) 10 mg tablet TK 1 T PO QD    scopolamine (TRANSDERM-SCOP) 1 mg over 3 days pt3d APPLY PATCH TRANSDERMALLY AS DIRECTED Q 3 DAYS    vitamin e (E GEMS) 1,000 unit capsule Take 1,000 Units by mouth daily.  aspirin 81 mg tablet Take 81 mg by mouth. No current facility-administered medications for this visit. Allergies   Allergen Reactions    Codeine Palpitations     Heart races    Corticosteroids (Glucocorticoids) Other (comments)     \"skin turns red, extremely tired\"    Other Medication Other (comments)     Steroids- \"skin turns red, extremely tired\"        Review of Systems: A complete review of systems was obtained, negative except as described above. Physical Exam:     Visit Vitals  /72   Pulse (!) 59   Temp 97.6 °F (36.4 °C) (Oral)   Resp 16   Ht 5' 3\" (1.6 m)   Wt 135 lb (61.2 kg)   SpO2 98%   BMI 23.91 kg/m²     ECOG PS: 0-1  General: Well developed, no acute distress  Eyes: PERRLA, EOMI, anicteric sclerae  HENT: Atraumatic, OP clear, TMs intact without erythema  Neck: Supple  Lymphatic: No cervical, supraclavicular, axillary or inguinal adenopathy  Respiratory: CTAB, normal respiratory effort  CV: Normal rate, regular rhythm, no murmurs, no peripheral edema  GI: Soft, nontender, nondistended, no masses, no hepatomegaly, no splenomegaly  MS: Normal gait and station. Digits without clubbing or cyanosis. Skin: No rashes, ecchymoses, or petechiae. Normal temperature, turgor, and texture. Neuro/Psych: Alert, oriented. 5/5 strength in all 4 extremities. Appropriate affect, normal judgment/insight.     Results:     Lab Results   Component Value Date/Time    WBC 4.2 10/07/2019 09:46 AM    HGB 11.4 10/07/2019 09:46 AM    HCT 35.4 10/07/2019 09:46 AM PLATELET 456 57/25/5975 09:46 AM    MCV 97 10/07/2019 09:46 AM    ABS. NEUTROPHILS 1.9 10/07/2019 09:46 AM    Hemoglobin (POC) 11.2 (L) 09/08/2015 08:26 AM    Hematocrit (POC) 33 (L) 09/08/2015 08:26 AM     Lab Results   Component Value Date/Time    Sodium 138 10/07/2019 09:46 AM    Potassium 4.8 10/07/2019 09:46 AM    Chloride 103 10/07/2019 09:46 AM    CO2 21 10/07/2019 09:46 AM    Glucose 127 (H) 10/07/2019 09:46 AM    BUN 13 10/07/2019 09:46 AM    Creatinine 1.21 (H) 10/07/2019 09:46 AM    GFR est AA 48 (L) 10/07/2019 09:46 AM    GFR est non-AA 42 (L) 10/07/2019 09:46 AM    Calcium 9.5 10/07/2019 09:46 AM    Sodium (POC) 142 09/08/2015 08:26 AM    Potassium (POC) 3.8 09/08/2015 08:26 AM    Chloride (POC) 103 09/08/2015 08:26 AM    Glucose (POC) 114 (H) 09/14/2015 07:16 AM    BUN (POC) 18 09/08/2015 08:26 AM    Creatinine (POC) 1.1 09/08/2015 08:26 AM    Calcium, ionized (POC) 1.29 09/08/2015 08:26 AM     Lab Results   Component Value Date/Time    Bilirubin, total 0.4 10/07/2019 09:46 AM    ALT (SGPT) 12 10/07/2019 09:46 AM    AST (SGOT) 18 10/07/2019 09:46 AM    Alk. phosphatase 48 10/07/2019 09:46 AM    Protein, total 9.0 (H) 10/07/2019 09:46 AM    Albumin 3.9 10/07/2019 09:46 AM    Globulin 6.3 (H) 10/13/2018 01:12 PM     Lab Results   Component Value Date/Time    Iron 88 07/31/2018 11:11 AM    TIBC 291 07/31/2018 11:11 AM    Iron % saturation 30 07/31/2018 11:11 AM    Ferritin 115 07/31/2018 11:11 AM     7/30/18: IgG kappa M protein 3.6, free kappa , lambda LC 7, k/l ratio 37. IgG 4104, IgA 13, IgM 16,  B2M 2.6  3/4/2019 Labs from [facility]: IgG 4496, IgA 10, IgM 13, Free [place] .21, Free Lambda LC 7.15, K/L ratio: 29.26, crt 1.03, BUN 15, crt clearance 38.7 Ml/min,   SPE path interp: Three M-protein peaks are still present. Taken together, they suggest borderline increase when compared to previous values of 2.4, 1.1, and 0.4 gm/100 ml on 11/19/18.   4/16/19:  IgG kappa M protein 3.7, free kappa , lambda LC 7.5, k/l ratio 38.5, IgG 4513, IgA 9, IgM 12.  19: IgG 4419, IgA 9,  IgM 11, m-spike 1.8,  [place] .7, Lambda LC 5.9,  K/L ratio 44.19  10/7/19: IgG 4210, IgA 10, IgM 15, m-spike 3.3, Kappa .1, Lambda LC 7.5, K/L ratio 34.95      Imagin/31/18 Bone survey:  IMPRESSION:  1. 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 Attention on follow-up is  recommended. 2. No other suspicious lucent lesions. PET 2018 ([facility]:  1.  There is no evidence of metabolically active myeloma. Metabolic activity in the bilateral posterior iliac bones consistent with recent biopsy. 2.  Non-FDG avid right pleural nodule and FDG avid right hilar activity adjacent to surgical material. These findings may represent post therapy changes from previously treated lung cancer. Recommend attention on follow-up imaging. 3.  Photopenic defect in the right parietal region may represent previous injury. Please correlate with history of possible CVA. Assessment & Plan:   Peyton Bagley is a 78 y.o. female with HTN and remote h/o lung cancer comes in for evaluation and management of multiple myeloma. 1. 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 Skeletal survey negative and no evidence of end organ dysfunction. I spent a long time discussing the difference between MGUS, smoldering myeloma and active multiple myeloma. I discussed that while her  received VRd regimen, which is the initial standard of care treatment, this is not absolute.  If she requires treatment and understands the risks/benefits, it is reasonable to start her on a doublet regimen instead of triplet, understanding that she values quality of life over progression free survival. In smoldering myeloma, the factors associated with an increased risk of progression include: an abnormal free light chain ratio (<0.125 or >8.0), bone marrow clonal plasma cells 50 to 60 percent, a serum M protein ?3 g/dL (or progressive increase in M protein level), IgA SMM, immunoparesis, high risk genetic features, increased circulating plasma cells, abnormal plasma cell immunophenotype, and abnormalities on imaging (magnetic resonance imaging [MRI] or positron emission tomography/computed tomography [PET/CT]). Patient has 3 of these high risk features. Nonetheless, I only recommend treatment for active rather than smoldering myeloma. PET 9/2018 at [facility] was negative for occult bone lesions. Pt had previously seen Dr. Kelly Tellez at [facility] for 2nd opinion, but no longer seeing him. I also recommend labs and close follow up every 3 months. -- f/u pending labs - reminded pt to try to get labs obtained 1 week prior to visit so we can review in person at our visit. -- f/u in 3 months with labs gammopathy, light chains, CBC and CMP. 2. H/o right lung cancer: Stage I [T2N0] s/p lobectomy by Dr. Irlanda Mosher in 2015. Last surveillance CXR was in 6/2016.     3. HTN: On Metoprolol, Nifedipine, Clonidine. Was taken off of Losartan. Takes ASA 81mg/d. Uncontrolled today, but pt states it is better at PCP's office. Will continue to monitor. 4. Vitamin B12 deficiency: Diagnosed in early 2018, on monthly B12 injections. Sent VitB12 injections, syringes, needles to her phamarcy. Patient continues on B12 monthly. -- Continue B12 injections monthly. 5. Neuropathic pain: Suggested Gabapentin, but pt states she only has severe pain once in a while. Has used Tramadol for use in past as needed sparingly. 6. Mild renal insufficiency: Not due to myeloma given stability of gammopathy labs. Likely due to decreased PO intake. Now improvement from 1.37 to 1. 12. I appreciate the opportunity to participate in Ms. Sasha Yusuf's care.     Signed By: Adela Gavin MD     January 16, 2020

## 2020-01-16 ENCOUNTER — OFFICE VISIT (OUTPATIENT)
Dept: ONCOLOGY | Age: 80
End: 2020-01-16

## 2020-01-16 VITALS
HEART RATE: 59 BPM | SYSTOLIC BLOOD PRESSURE: 164 MMHG | BODY MASS INDEX: 23.92 KG/M2 | RESPIRATION RATE: 16 BRPM | DIASTOLIC BLOOD PRESSURE: 72 MMHG | TEMPERATURE: 97.6 F | HEIGHT: 63 IN | WEIGHT: 135 LBS | OXYGEN SATURATION: 98 %

## 2020-01-16 DIAGNOSIS — D47.2 SMOLDERING MULTIPLE MYELOMA: Primary | ICD-10-CM

## 2020-01-16 DIAGNOSIS — N28.9 RENAL INSUFFICIENCY: ICD-10-CM

## 2020-01-16 DIAGNOSIS — D51.8 VITAMIN B12 DEFICIENCY (DIETARY) ANEMIA: ICD-10-CM

## 2020-01-16 DIAGNOSIS — I10 ESSENTIAL HYPERTENSION: ICD-10-CM

## 2020-01-16 DIAGNOSIS — Z85.118 H/O: LUNG CANCER: ICD-10-CM

## 2020-01-16 LAB
ALBUMIN SERPL ELPH-MCNC: 3.8 G/DL (ref 2.9–4.4)
ALBUMIN SERPL-MCNC: 4 G/DL (ref 3.5–4.7)
ALBUMIN/GLOB SERPL: 0.8 {RATIO} (ref 0.7–1.7)
ALBUMIN/GLOB SERPL: 0.8 {RATIO} (ref 1.2–2.2)
ALP SERPL-CCNC: 69 IU/L (ref 39–117)
ALPHA1 GLOB SERPL ELPH-MCNC: 0.3 G/DL (ref 0–0.4)
ALPHA2 GLOB SERPL ELPH-MCNC: 0.7 G/DL (ref 0.4–1)
ALT SERPL-CCNC: 8 IU/L (ref 0–32)
AST SERPL-CCNC: 15 IU/L (ref 0–40)
B-GLOBULIN SERPL ELPH-MCNC: 0.9 G/DL (ref 0.7–1.3)
BASOPHILS # BLD AUTO: 0.1 X10E3/UL (ref 0–0.2)
BASOPHILS NFR BLD AUTO: 1 %
BILIRUB SERPL-MCNC: 0.3 MG/DL (ref 0–1.2)
BUN SERPL-MCNC: 9 MG/DL (ref 8–27)
BUN/CREAT SERPL: 8 (ref 12–28)
CALCIUM SERPL-MCNC: 9.8 MG/DL (ref 8.7–10.3)
CHLORIDE SERPL-SCNC: 100 MMOL/L (ref 96–106)
CO2 SERPL-SCNC: 25 MMOL/L (ref 20–29)
CREAT SERPL-MCNC: 1.18 MG/DL (ref 0.57–1)
EOSINOPHIL # BLD AUTO: 0.3 X10E3/UL (ref 0–0.4)
EOSINOPHIL NFR BLD AUTO: 4 %
ERYTHROCYTE [DISTWIDTH] IN BLOOD BY AUTOMATED COUNT: 14.2 % (ref 11.7–15.4)
GAMMA GLOB SERPL ELPH-MCNC: 3.3 G/DL (ref 0.4–1.8)
GLOBULIN SER CALC-MCNC: 5.1 G/DL (ref 1.5–4.5)
GLOBULIN SER-MCNC: 5.3 G/DL (ref 2.2–3.9)
GLUCOSE SERPL-MCNC: 91 MG/DL (ref 65–99)
HCT VFR BLD AUTO: 35.3 % (ref 34–46.6)
HGB BLD-MCNC: 11.6 G/DL (ref 11.1–15.9)
IGA SERPL-MCNC: 10 MG/DL (ref 64–422)
IGG SERPL-MCNC: 4132 MG/DL (ref 700–1600)
IGM SERPL-MCNC: 12 MG/DL (ref 26–217)
IMM GRANULOCYTES # BLD AUTO: 0 X10E3/UL (ref 0–0.1)
IMM GRANULOCYTES NFR BLD AUTO: 0 %
INTERPRETATION SERPL IEP-IMP: ABNORMAL
KAPPA LC FREE SER-MCNC: 159.9 MG/L (ref 3.3–19.4)
KAPPA LC FREE/LAMBDA FREE SER: 20.5 {RATIO} (ref 0.26–1.65)
LAMBDA LC FREE SERPL-MCNC: 7.8 MG/L (ref 5.7–26.3)
LYMPHOCYTES # BLD AUTO: 2.5 X10E3/UL (ref 0.7–3.1)
LYMPHOCYTES NFR BLD AUTO: 31 %
M PROTEIN SERPL ELPH-MCNC: 3.2 G/DL
MCH RBC QN AUTO: 31.4 PG (ref 26.6–33)
MCHC RBC AUTO-ENTMCNC: 32.9 G/DL (ref 31.5–35.7)
MCV RBC AUTO: 96 FL (ref 79–97)
MONOCYTES # BLD AUTO: 0.5 X10E3/UL (ref 0.1–0.9)
MONOCYTES NFR BLD AUTO: 6 %
NEUTROPHILS # BLD AUTO: 4.6 X10E3/UL (ref 1.4–7)
NEUTROPHILS NFR BLD AUTO: 58 %
PLATELET # BLD AUTO: 208 X10E3/UL (ref 150–450)
PLEASE NOTE:, 149534: ABNORMAL
POTASSIUM SERPL-SCNC: 4.5 MMOL/L (ref 3.5–5.2)
PROT SERPL-MCNC: 9.1 G/DL (ref 6–8.5)
RBC # BLD AUTO: 3.69 X10E6/UL (ref 3.77–5.28)
SODIUM SERPL-SCNC: 135 MMOL/L (ref 134–144)
WBC # BLD AUTO: 7.8 X10E3/UL (ref 3.4–10.8)

## 2020-01-16 NOTE — PROGRESS NOTES
Called patient. Informed her, per Sarah Menendez, that patient's myeloma labs are stable. She verbalized understanding and denies any further questions or concerns at this time.

## 2020-01-16 NOTE — PROGRESS NOTES
Kirillhugo Chavez is a 78 y.o. female here today for follow up of multiple myeloma. States she is currently taking Z isaiah for cold symptoms.

## 2020-02-10 DIAGNOSIS — D51.8 VITAMIN B12 DEFICIENCY (DIETARY) ANEMIA: ICD-10-CM

## 2020-02-10 RX ORDER — CYANOCOBALAMIN 1000 UG/ML
INJECTION, SOLUTION INTRAMUSCULAR; SUBCUTANEOUS
Qty: 12 ML | Refills: 1 | Status: SHIPPED | OUTPATIENT
Start: 2020-02-10

## 2020-04-06 DIAGNOSIS — D47.2 SMOLDERING MULTIPLE MYELOMA: ICD-10-CM

## 2020-05-27 ENCOUNTER — HOSPITAL ENCOUNTER (OUTPATIENT)
Age: 80
Setting detail: OUTPATIENT SURGERY
Discharge: HOME | End: 2020-05-27
Attending: SPECIALIST | Admitting: SPECIALIST
Payer: MEDICARE

## 2020-05-27 VITALS
TEMPERATURE: 97.8 F | DIASTOLIC BLOOD PRESSURE: 53 MMHG | BODY MASS INDEX: 23.55 KG/M2 | OXYGEN SATURATION: 98 % | HEIGHT: 63 IN | RESPIRATION RATE: 16 BRPM | SYSTOLIC BLOOD PRESSURE: 124 MMHG | HEART RATE: 63 BPM | WEIGHT: 132.94 LBS

## 2020-05-27 DIAGNOSIS — I25.10 CORONARY ATHEROSCLEROSIS OF NATIVE CORONARY ARTERY: ICD-10-CM

## 2020-05-27 PROCEDURE — 93458 L HRT ARTERY/VENTRICLE ANGIO: CPT | Performed by: SPECIALIST

## 2020-05-27 PROCEDURE — 99152 MOD SED SAME PHYS/QHP 5/>YRS: CPT | Performed by: SPECIALIST

## 2020-05-27 PROCEDURE — C1894 INTRO/SHEATH, NON-LASER: HCPCS | Performed by: SPECIALIST

## 2020-05-27 PROCEDURE — 74011636320 HC RX REV CODE- 636/320: Performed by: SPECIALIST

## 2020-05-27 PROCEDURE — 74011250636 HC RX REV CODE- 250/636: Performed by: SPECIALIST

## 2020-05-27 PROCEDURE — C1760 CLOSURE DEV, VASC: HCPCS | Performed by: SPECIALIST

## 2020-05-27 PROCEDURE — 74011000250 HC RX REV CODE- 250: Performed by: SPECIALIST

## 2020-05-27 PROCEDURE — 77030004532 HC CATH ANGI DX IMP BSC -A: Performed by: SPECIALIST

## 2020-05-27 PROCEDURE — 99153 MOD SED SAME PHYS/QHP EA: CPT | Performed by: SPECIALIST

## 2020-05-27 RX ORDER — HEPARIN SODIUM 200 [USP'U]/100ML
INJECTION, SOLUTION INTRAVENOUS
Status: COMPLETED | OUTPATIENT
Start: 2020-05-27 | End: 2020-05-27

## 2020-05-27 RX ORDER — LIDOCAINE HYDROCHLORIDE 10 MG/ML
INJECTION INFILTRATION; PERINEURAL AS NEEDED
Status: DISCONTINUED | OUTPATIENT
Start: 2020-05-27 | End: 2020-05-27 | Stop reason: HOSPADM

## 2020-05-27 RX ORDER — SODIUM CHLORIDE 0.9 % (FLUSH) 0.9 %
5-40 SYRINGE (ML) INJECTION EVERY 8 HOURS
Status: DISCONTINUED | OUTPATIENT
Start: 2020-05-27 | End: 2020-05-27 | Stop reason: HOSPADM

## 2020-05-27 RX ORDER — MIDAZOLAM HYDROCHLORIDE 1 MG/ML
INJECTION, SOLUTION INTRAMUSCULAR; INTRAVENOUS AS NEEDED
Status: DISCONTINUED | OUTPATIENT
Start: 2020-05-27 | End: 2020-05-27 | Stop reason: HOSPADM

## 2020-05-27 RX ORDER — DIPHENHYDRAMINE HYDROCHLORIDE 50 MG/ML
25 INJECTION, SOLUTION INTRAMUSCULAR; INTRAVENOUS
Status: DISCONTINUED | OUTPATIENT
Start: 2020-05-27 | End: 2020-05-27 | Stop reason: HOSPADM

## 2020-05-27 RX ORDER — SODIUM CHLORIDE 0.9 % (FLUSH) 0.9 %
5-40 SYRINGE (ML) INJECTION AS NEEDED
Status: DISCONTINUED | OUTPATIENT
Start: 2020-05-27 | End: 2020-05-27 | Stop reason: HOSPADM

## 2020-05-27 RX ORDER — FENTANYL CITRATE 50 UG/ML
INJECTION, SOLUTION INTRAMUSCULAR; INTRAVENOUS AS NEEDED
Status: DISCONTINUED | OUTPATIENT
Start: 2020-05-27 | End: 2020-05-27 | Stop reason: HOSPADM

## 2020-05-27 RX ORDER — SODIUM CHLORIDE 9 MG/ML
125 INJECTION, SOLUTION INTRAVENOUS CONTINUOUS
Status: DISCONTINUED | OUTPATIENT
Start: 2020-05-27 | End: 2020-05-27 | Stop reason: HOSPADM

## 2020-05-27 RX ADMIN — SODIUM CHLORIDE 75 ML/HR: 900 INJECTION, SOLUTION INTRAVENOUS at 10:47

## 2020-05-27 NOTE — ROUTINE PROCESS
Received patient from waiting area. Armband and allergies verbally confirmed with patient. Procedure explained and consents signed.

## 2020-05-27 NOTE — DISCHARGE INSTRUCTIONS
Discharge Instructions:                      Cardiac Catheterization/Angiography Discharge Instructions    *Check the puncture site frequently for swelling or bleeding. If you see any bleeding, lie down and apply pressure over the area and call 911. Notify your doctor for any redness, swelling, drainage or oozing from the puncture site. Notify your doctor for any fever or chills. *If the leg or arm with the puncture becomes cold, numb or painful, call 911    *Activity should be limited for the next 48 hours. Climb stairs as little as possible and avoid any stooping, bending or strenuous activity for 48 hours. No heavy lifting (anything over 10 pounds) for three days. *Do not drive for 24 hours. *You may resume your usual diet. Drink more fluids than usual.    *Have a responsible person drive you home and stay with you for at least 24 hours after your heart catheterization/angiography. *You may remove the bandage from your groin in 24 hours. You may shower in 24 hours. No tub baths, hot tubs or swimming for one week. Do not place any lotions, creams, powders, ointments over the puncture site for one week. You may place a clean band-aid over the puncture site each day for 5 days. Change this daily. Medications: Activity:     As tolerated except do not lift over 10 pounds for 5 days. Diet:     American Heart Association. Follow-up:     Follow up with Vesna De La O MD on June 9th, 2020 at 11:30am.  [address] [place] [address]  [phone]      If you smoke, STOP!

## 2020-05-27 NOTE — Clinical Note
TRANSFER - IN REPORT:     Verbal report received from: Hanna French RN. Report consisted of patient's Situation, Background, Assessment and   Recommendations(SBAR). Opportunity for questions and clarification was provided. Assessment completed upon patient's arrival to unit and care assumed. Patient transported with a Registered Nurse.

## 2020-05-27 NOTE — H&P
Date of Surgery Update:  Ab Bruno was seen and examined. History and physical has been reviewed. The patient has been examined. There have been no significant clinical changes since the completion of the originally dated History and Physical.    Signed By: Valentina Gamble MD     May 27, 2020 10:22 AM       Johnson Bonilla [birth year]   Office/Outpatient Visit  Visit Date: Tue, May 26, 2020 11:00 am  Provider: Erik Wallace MD (Assistant: Cris Dominguez RN )  Location: [facility])[address] [place]. [address] [phone]    Electronically signed by Moishe Holstein, MD on  05/26/2020 11:38:32 AM                           Subjective:    CC: Mrs. Khurram Morse is a 78year old White female. Patient states her PCP has been changed to Oskar Carpio MD.  She presents today with a complaint of chest pain, dizziness, shortness of breath, and elevated home blood pressure. Medication bottles reviewed. patient states that she is not taking clonidine at this timeShe has a history of coronary artery disease of the native coronary artery, cerebrovascular accident, hypercholesterolemia, and hypertension. HPI:           Coronary Artery Disease:  MD Notes: abn ekg, not associated with activity, plan cath in am with known disease, progression in symptoms   She is here today due to worsening of symptoms. Currently, her treatment regimen consists of daily 81 mg aspirin and Toprol-XL. Current symptoms include chest pain, shortness of breath, dizziness and fatigue/lethargic. The patient has had prior EBT 2017 w/calcium score of 258 and exercise stress test (reported performed 10/31/17 - 4 mins 32 secs ). family hx of heart disease. Hypercholesterolemia: Current treatment includes a low cholesterol/low fat diet. Regarding hypertension:  Type Primary Hypertension Currently, her treatment regimen consists of daily 81 mg aspirin,  Toprol-XL, and Procardia XL. Regarding chest pain:   The discomfort is located primarily in the center of the chest.  The pain initially began two weeks ago. Associated symptoms include dyspnea, dizziness and fatigue, lethargic. Described as ache and then will have a sharp jab. Does have pain in back - thinks all related. CP is random - not associated with activity. C/O Lack of energy. Has not been very active due to lack of energy Both been over a couple of weeks. States BP was high .   log shows 128-140s and did have reading of 171 and then will go high that machine cannot read. BP better now using the Nifedipine and Metoprolol. Mentioned possible heart cath. Discussed procedure with pt. CP is off and on all day - to make it better she has to sit and try to distract herself she states. Does have arthritis in hands/joints. When twists or moves no pain in chest or back. ROS:   Discussed relevant lab and imaging studies along with the plan of treatment with the nurse. EYES:  Negative for blurred vision and eye pain. E/N/T:  Negative for epistaxis and hoarseness. CARDIOVASCULAR:  Please see HPI. RESPIRATORY:  Negative for cough and hemoptysis. GASTROINTESTINAL:  Negative for abdominal pain and dysphagia. MUSCULOSKELETAL:  Negative for arthralgias and back pain. NEUROLOGICAL:  Negative for headaches, paresthesias, and weakness. HEMATOLOGIC/LYMPHATIC:  Negative for easy bruising, bleeding, and lymphadenopathy. PSYCHIATRIC:  No symptoms reported. Past Medical History / Family History / Social History:     Last Reviewed on 5/26/2020 11:13 AM by Darylene Friedlander  Past Medical History:     Coronary artery disease: dx'd in ebt score 258 in 2017  Hypercholesterolemia  Hypertension   Osteoarthritis   Cerebrovascular Accident: x 1;   Tension Headaches   Anxiety/Depression  Monoclonal gammopathy   INFLUENZA VACCINE: was last done 2019     Past Cardiac Procedures:  Stress Echo:  12/5/18 - 3 mins.  There was ECG evidence of ischemia. No ischemia noted on echocardiogram, normal pre & post global wall motion. The patient did not experience chest discomfort. Normal heart rate response to exercise. Hayes score is -2 with the heart rate recovery of 14. Calcium score(EBT)17 score of 258  Holter Monitor:  10/31/17 - Baseline rhythm was normal sinus. Average heart rate was 65 bpm.   Minimum heart rate of 52 bpm and maximum heart rate of 113 bpm.   There were rare PACs. Atrial couplets also noted. Runs of supraventricular tachycardia consisting of 6 beats lasting for 2.2 seconds. There were rare PVCs. No pauses noted. No arrhythmias noted during diary entries. Stress Echo 10/31/17 - 4 mins 32 secs. There was ECG evidence of ischemia. No ischemia noted on echocardiogram, normal pre & post global wall motion. The patient did not experience chest discomfort. Normal  blood pressure response to exercise. Normal heart rate response to exercise. Poor exercise capacity. There is mild aortic regurgitation. There is mild mitral regurgitation. There is mild to moderate tricuspid regurgitation. Patient is scheduled for EBT scan to check calcium score. Stress Cardiolite 4/15/04, negative ECG portion at target heart rate. No evidence of exercise-induced ischemia on nuclear imaging. EF of 68% with normal LV wall motion. Lexiscan Cardiolite 11, Normal myocardial perfusion Sestamibi SPECT imaging. Calculated left ventricular ejection fraction was normal at 71%. CURRENT MEDICAL PROVIDERS:  Oncologist: Franklin Blackwell     Surgical History:   Surgical/Procedural History:   Appendectomy  Tonsillectomy   Bilateral Tubal Ligation     Family History:   Family History:   Father: ;  Myocardial Infarction. Mother: ;  Parkinson's Plus. Brother(s): 3 brother(s) total; 1 ;  Coronary Artery Disease ( CABG x4V ); Myocardial Infarction.     Sister(s): 6 sister(s) total; ?  ;  Cerebrovascular Accident. Social History:   Social History:   Occupation: Retired   Marital Status:    Children: 3 children     Tobacco/Alcohol/Supplements:   Last Reviewed on 5/26/2020 11:13 AM by Benji Stockton  TOBACCO/ALCOHOL/SUPPLEMENTS   Tobacco: She has never smoked. Alcohol: Non-drinker   Caffeine:  She admits to consuming caffeine via coffee ( 0-1 servings per day ), tea ( rare ), and soda ( 1 serving per day ). Substance Abuse History:   Last Reviewed on 5/26/2020 11:13 AM by Benji Stockton  Substance Use/Abuse: None     Mental Health History:   Last Reviewed on 5/26/2020 11:13 AM by Benji Stockton    Communicable Diseases (eg STDs): Last Reviewed on 5/26/2020 11:13 AM by Benji Stockton    Allergies:   Last Reviewed on 5/26/2020 11:13 AM by Benji Stockton  Prednisone: erythema of skin   Codeine Sulfate: palpitations   Statins:    Losartan:   (Adverse Reaction)  Losartan:   (Adverse Reaction)    Current Medications:   Last Reviewed on 5/26/2020 11:13 AM by Benji Stockton  aspirin 81 mg oral tablet, delayed release (enteric coated) [1 po qd]  Metoprolol Succinate 50 mg oral Tablet, Extended Release 24 hr [1 po qd]  Vitamin D3 1,000 unit oral capsule [1 po qd]  Vitamin E 1,000 unit oral capsule [Take 1 capsule(s) by mouth daily]  Benadryl 1 po qd prn   NIFEdipine 30 mg oral tablet, extended release [take 1 tablet (30 mg) by oral route once daily]  vitamin a   zolpidem 5 mg oral tablet [take 1 tablet (5 mg) by oral route once daily at bedtime as needed]    Objective:    Vitals:     Historical:   6/7/2019  BP:   151/79 mm Hg ( (left arm, , standing, );) 6/7/2019  BP:   155/75 mm Hg ( (left arm, , sitting, );) 6/7/2019  Wt:   146lbs  Current: 5/26/2020 11:13:11 AM  Ht:  5 ft, 4 in; Wt: 134 lbs;  BMI: 23. 0BP: 96/46 mm Hg (left arm, sitting);  P: 78 bpm (left arm (BP Cuff), sitting);  sCr: 1.32 mg/dL;  GFR: 31.47    Repeat:   11:13:18 AM  BP:   119/62mm Hg (left arm, standing)   Exams:   GENERAL: Alert, oriented to person, place and time x3. EYES:  Normal lids without xanthelasma; conjunctiva unremarkable; no scleral icterus. HEENT:  Face symmetric, voice clear, extraocular muscles intact. NECK:  Supple. No bruits, No JVD. LUNGS:  Clear to auscultation. No rales, no wheezing. CARDIAC:  Regular rate and rhythm. PMI not displaced. ABDOMEN:  Soft. Positive bowel sounds. Non-tender. MUSCULOSKELETAL:  Normal range of motion, strength and tone. EXTREMITIES:  No edema. Good muscle tone and strength. SKIN: No significant rashes or lesions; no suspicious moles. NEUROLOGICAL:  No focal deficits, cranial nerves II-XII are grossly intact. PSYCHIATRIC:  Appropriate affect and demeanor; normal speech pattern; grossly normal memory. Procedures:   Atherosclerotic heart disease of native coronary artery without angina pectoris    ECG INTERPRETATION: See scanned EKG for results.         Assessment:     I25.10   Atherosclerotic heart disease of native coronary artery without angina pectoris     E78.0   Pure hypercholesterolemia     I10   Essential (primary) hypertension     R07.9   Chest pain, unspecified     R42   Dizziness and giddiness     R06.02   Shortness of breath     414.01   Coronary artery disease, of native coronary artery     I25.10   Atherosclerotic heart disease of native coronary artery without angina pectoris     786.05   Paroxysmal nocturnal dyspnea   (Mild)   R06.02   Shortness of breath       ORDERS:     Lab Orders:     [id number]  Troponin, quantitative  (Send-Out)          88869  Basic metabolic panel (Ca, CO2, Cl, Creatinine, Glu, K, Na, BUN)  (Send-Out)          54544  Complete (CBC), automated (Hgb, Hct, RBC, WBC, and platelet count)  (In-House)          03029  Magnesium level  (Send-Out)          Procedures Ordered:     [id number]  Education and train for pt self-mgmt by qualified, nonphysician, brannon 30 minutes; individual pt  (Send-Out)          40340  Electrocardiogram, routine with at least 12 leads; with interpretation and report  (In-House)          XCATH  Cardiac Cath  (In-House)          Other Orders:       ACE or ARB NOT Prescribed, reason not given  (In-House)            LDL-C >= 100 mg/dL  (Send-Out)          8962P3Q  NO Statin prescribed due to Reason not otherwise specified  (In-House)          0556F  Plan of care to achieve lipid control documented (CAD)  (In-House)          4013F  Statin therapy rxd/currently taken(CAD)  (In-House)          4086F  Aspirin or clopidogrel prescribed (CAD)  (Send-Out)          [id number]  Queried Patient for Tobacco Use  (Send-Out)              Plan:     Atherosclerotic heart disease of native coronary artery without angina pectoris  CAD: with ACE/ARB Rx without Diabetes or LVSD, ACE/ARB NOT Prescribed, Reason not given Lipid Control LDL >= 100 mg/dL NO Statin, Reason NOS NO Station prescribed due to Reason not otherwise specified LDL >= 100 mg/dL Plan of care to achieve lipid control documented Statin therapy prescribed or currently being taken with Antiplatelet Therapy Aspirin or Clopidogrel Prescribed     Medication list has been reviewed. Continue current medications. Change the following medication(s):  Nifedipine 30 mg bid. .      Smoking Status:  Nonsmoker     Testing/Procedures:   Cardiac Catheterization -   Explained to the patient the indication, procedure, risks, and benefits of cardiac catheterization. The patient understands and wishes to proceed with the cath to be performed tomorrow at [facility] for evaluation of chest pain, coronary artery disease, and shortness of breath. Lab Orders: troponin I, basic metabolic panel, CBC, Magnesium, and  [facility]   hold and call!!!  if they use lab nnamdi then go to [facility] [place]     Schedule a follow up appointment in 2 weeks. Discussed with patient diet, exercise plan and lifestyle modifications.       The above note was transcribed by Damon Arizmendi and authenticated by Dr. Prakash Ozuna prior to sign off. Orders:       ACE or ARB NOT Prescribed, reason not given  (In-House)            LDL-C >= 100 mg/dL  (Send-Out)          [id number]  NO Statin prescribed due to Reason not otherwise specified  (In-House)          0556F  Plan of care to achieve lipid control documented (CAD)  (In-House)          4013F  Statin therapy rxd/currently taken(CAD)  (In-House)          6178X  Aspirin or clopidogrel prescribed (CAD)  (Send-Out)          [id number]  Queried Patient for Tobacco Use  (Send-Out)          74854  Education and train for pt self-mgmt by qualified, nonphysician, ea 30 minutes; individual pt  (Send-Out)          25778  Electrocardiogram, routine with at least 12 leads; with interpretation and report  (In-House)          XCATH  Cardiac Cath  (In-House)          99230  Troponin, quantitative  (Send-Out)          05792  Basic metabolic panel (Ca, CO2, Cl, Creatinine, Glu, K, Na, BUN)  (Send-Out)          24953  Complete (CBC), automated (Hgb, Hct, RBC, WBC, and platelet count)  (In-House)          83480  Magnesium level  (Send-Out)            Patient Education Handouts:     COV Coronary Artery Disease      COV Heart Healthy Diet        Patient Recommendations: For  Atherosclerotic heart disease of native coronary artery without angina pectoris:  Your medication list has been reviewed. Continue current medications. Change Nifedipine 30 mg bid. .      You need to have the following test/procedure(s) done:     Cardiac Catheterization - Explained to you the indication, procedure, risks and benefits of cardiac catheterization. You expressed a desire to go forward with the procedure to be performed tomorrow at [place] [address]. I want you to have the following labs done: troponin I, BMP, CBC, magnesium.  [facility]   hold and call!!!  if they use lab nnamdi then go to [facility] per [facility],   Schedule a follow-up visit in 2 weeks.

## 2020-05-27 NOTE — Clinical Note
Dr Estefana Goodell notified of pt SBP in 180s/190s. No interventions per Dr Estefana Goodell.  Will continue to monitor

## 2020-05-27 NOTE — PROGRESS NOTES
10:23 AM      Patient arrived. ID and allergies verified verbally with patient. Pt voices understanding of procedure to be performed. Consent obtained. Pt prepped for procedure. 1218    TRANSFER - IN REPORT:    Verbal report received from Ray Booker [facility] (name) on Anahi Salgado  being received from CATH LAB (unit) for routine progression of care      Report consisted of patients Situation, Background, Assessment and   Recommendations(SBAR). Information from the following report(s) Procedure Summary was reviewed with the receiving nurse. Opportunity for questions and clarification was provided. Assessment completed upon patients arrival to unit and care assumed. Patient rush sips  No complaints    1310    Discharge instructions reviewed with patient and family. Voiced understanding. Patient given copy of discharge instructions to take home. 1:23 PM    HOB 30degrees    1350    HOB 45 degrees    Patient sitting up in bed eating lunch  Site intact  No complaints      1415    Patient sitting at bedside  Patient ambulated to restroom and back to CLPO  No complaints  Site intact      1430    Pt discharged via wheelchair with Froilan Matamoros RN. Personal belongings with patient upon discharge.

## 2020-05-27 NOTE — PROCEDURES
Cath:  Minimal (non-obstructive) CAD     LAD p20     LCx patent     RCA ost20. Normal LVF (EF 65%). No AVG/MR.  RFA mynx    Med mgmt of mild CAD and HTN. F/U with Dr. Kervin Brunson 6/9/20.

## 2020-05-27 NOTE — Clinical Note
TRANSFER - OUT REPORT:     Verbal report given to: Jose Martin Lui RN. Report consisted of patient's Situation, Background, Assessment and   Recommendations(SBAR). Opportunity for questions and clarification was provided. Patient transported with a Registered Nurse. Patient transported to: [facility].

## 2020-05-27 NOTE — PROGRESS NOTES
1130 TRANSFER - OUT REPORT:    Verbal report given to Emmanuel(name) on [address] [place] [address]  being transferred to cath lab(unit) for routine progression of care       Report consisted of patients Situation, Background, Assessment and   Recommendations(SBAR). Information from the following report(s) Procedure Summary was reviewed with the receiving nurse. Lines:   Peripheral IV 05/27/20 Left Forearm (Active)   Site Assessment Clean, dry, & intact 5/27/2020 10:42 AM   Phlebitis Assessment 0 5/27/2020 10:42 AM   Infiltration Assessment 0 5/27/2020 10:42 AM   Dressing Status Clean, dry, & intact 5/27/2020 10:42 AM   Dressing Type Transparent 5/27/2020 10:42 AM   Hub Color/Line Status Pink;Flushed 5/27/2020 10:42 AM        Opportunity for questions and clarification was provided.       Patient transported with:   Registered Nurse

## 2022-08-06 ENCOUNTER — APPOINTMENT (OUTPATIENT)
Dept: GENERAL RADIOLOGY | Age: 82
DRG: 378 | End: 2022-08-06
Attending: EMERGENCY MEDICINE
Payer: MEDICARE

## 2022-08-06 ENCOUNTER — HOSPITAL ENCOUNTER (INPATIENT)
Age: 82
LOS: 3 days | Discharge: HOME HEALTH | DRG: 378 | End: 2022-08-09
Attending: EMERGENCY MEDICINE | Admitting: STUDENT IN AN ORGANIZED HEALTH CARE EDUCATION/TRAINING PROGRAM
Payer: MEDICARE

## 2022-08-06 DIAGNOSIS — K92.2 GASTROINTESTINAL HEMORRHAGE, UNSPECIFIED GASTROINTESTINAL HEMORRHAGE TYPE: Primary | ICD-10-CM

## 2022-08-06 DIAGNOSIS — D64.9 SYMPTOMATIC ANEMIA: ICD-10-CM

## 2022-08-06 LAB
ALBUMIN SERPL-MCNC: 3.2 G/DL (ref 3.5–5)
ALBUMIN/GLOB SERPL: 0.5 {RATIO} (ref 1.1–2.2)
ALP SERPL-CCNC: 55 U/L (ref 45–117)
ALT SERPL-CCNC: 10 U/L (ref 12–78)
ANION GAP SERPL CALC-SCNC: 8 MMOL/L (ref 5–15)
APPEARANCE UR: CLEAR
AST SERPL-CCNC: 11 U/L (ref 15–37)
BACTERIA URNS QL MICRO: NEGATIVE /HPF
BASOPHILS # BLD: 0 K/UL (ref 0–0.1)
BASOPHILS NFR BLD: 1 % (ref 0–1)
BILIRUB SERPL-MCNC: 0.3 MG/DL (ref 0.2–1)
BILIRUB UR QL: NEGATIVE
BUN SERPL-MCNC: 38 MG/DL (ref 6–20)
BUN/CREAT SERPL: 37 (ref 12–20)
CALCIUM SERPL-MCNC: 9 MG/DL (ref 8.5–10.1)
CHLORIDE SERPL-SCNC: 103 MMOL/L (ref 97–108)
CO2 SERPL-SCNC: 22 MMOL/L (ref 21–32)
COLOR UR: ABNORMAL
COMMENT, HOLDF: NORMAL
CREAT SERPL-MCNC: 1.03 MG/DL (ref 0.55–1.02)
DIFFERENTIAL METHOD BLD: ABNORMAL
EOSINOPHIL # BLD: 0.2 K/UL (ref 0–0.4)
EOSINOPHIL NFR BLD: 2 % (ref 0–7)
EPITH CASTS URNS QL MICRO: ABNORMAL /LPF
ERYTHROCYTE [DISTWIDTH] IN BLOOD BY AUTOMATED COUNT: 14.4 % (ref 11.5–14.5)
GLOBULIN SER CALC-MCNC: 6.7 G/DL (ref 2–4)
GLUCOSE SERPL-MCNC: 138 MG/DL (ref 65–100)
GLUCOSE UR STRIP.AUTO-MCNC: NEGATIVE MG/DL
HCT VFR BLD AUTO: 28.1 % (ref 35–47)
HEMOCCULT STL QL: POSITIVE
HGB BLD-MCNC: 9.6 G/DL (ref 11.5–16)
HGB UR QL STRIP: NEGATIVE
HISTORY CHECKED?,CKHIST: NORMAL
IMM GRANULOCYTES # BLD AUTO: 0 K/UL (ref 0–0.04)
IMM GRANULOCYTES NFR BLD AUTO: 0 % (ref 0–0.5)
KETONES UR QL STRIP.AUTO: NEGATIVE MG/DL
LEUKOCYTE ESTERASE UR QL STRIP.AUTO: ABNORMAL
LYMPHOCYTES # BLD: 3.4 K/UL (ref 0.8–3.5)
LYMPHOCYTES NFR BLD: 40 % (ref 12–49)
MCH RBC QN AUTO: 31.1 PG (ref 26–34)
MCHC RBC AUTO-ENTMCNC: 34.2 G/DL (ref 30–36.5)
MCV RBC AUTO: 90.9 FL (ref 80–99)
MONOCYTES # BLD: 0.5 K/UL (ref 0–1)
MONOCYTES NFR BLD: 6 % (ref 5–13)
NEUTS SEG # BLD: 4.4 K/UL (ref 1.8–8)
NEUTS SEG NFR BLD: 51 % (ref 32–75)
NITRITE UR QL STRIP.AUTO: NEGATIVE
NRBC # BLD: 0 K/UL (ref 0–0.01)
NRBC BLD-RTO: 0 PER 100 WBC
PH UR STRIP: 6 [PH] (ref 5–8)
PLATELET # BLD AUTO: 168 K/UL (ref 150–400)
PMV BLD AUTO: 11.3 FL (ref 8.9–12.9)
POTASSIUM SERPL-SCNC: 3.8 MMOL/L (ref 3.5–5.1)
PROT SERPL-MCNC: 9.9 G/DL (ref 6.4–8.2)
PROT UR STRIP-MCNC: NEGATIVE MG/DL
RBC # BLD AUTO: 3.09 M/UL (ref 3.8–5.2)
RBC #/AREA URNS HPF: ABNORMAL /HPF (ref 0–5)
SAMPLES BEING HELD,HOLD: NORMAL
SODIUM SERPL-SCNC: 133 MMOL/L (ref 136–145)
SP GR UR REFRACTOMETRY: 1.01 (ref 1–1.03)
TROPONIN-HIGH SENSITIVITY: 5 NG/L (ref 0–51)
UR CULT HOLD, URHOLD: NORMAL
UROBILINOGEN UR QL STRIP.AUTO: 0.2 EU/DL (ref 0.2–1)
WBC # BLD AUTO: 8.5 K/UL (ref 3.6–11)
WBC URNS QL MICRO: ABNORMAL /HPF (ref 0–4)

## 2022-08-06 PROCEDURE — 82272 OCCULT BLD FECES 1-3 TESTS: CPT

## 2022-08-06 PROCEDURE — 74011250637 HC RX REV CODE- 250/637: Performed by: STUDENT IN AN ORGANIZED HEALTH CARE EDUCATION/TRAINING PROGRAM

## 2022-08-06 PROCEDURE — 99285 EMERGENCY DEPT VISIT HI MDM: CPT

## 2022-08-06 PROCEDURE — 74011000250 HC RX REV CODE- 250: Performed by: EMERGENCY MEDICINE

## 2022-08-06 PROCEDURE — 36415 COLL VENOUS BLD VENIPUNCTURE: CPT

## 2022-08-06 PROCEDURE — 74011250636 HC RX REV CODE- 250/636: Performed by: EMERGENCY MEDICINE

## 2022-08-06 PROCEDURE — 85025 COMPLETE CBC W/AUTO DIFF WBC: CPT

## 2022-08-06 PROCEDURE — 96374 THER/PROPH/DIAG INJ IV PUSH: CPT

## 2022-08-06 PROCEDURE — 71046 X-RAY EXAM CHEST 2 VIEWS: CPT

## 2022-08-06 PROCEDURE — 74011000250 HC RX REV CODE- 250: Performed by: STUDENT IN AN ORGANIZED HEALTH CARE EDUCATION/TRAINING PROGRAM

## 2022-08-06 PROCEDURE — 86900 BLOOD TYPING SEROLOGIC ABO: CPT

## 2022-08-06 PROCEDURE — 86870 RBC ANTIBODY IDENTIFICATION: CPT

## 2022-08-06 PROCEDURE — 80053 COMPREHEN METABOLIC PANEL: CPT

## 2022-08-06 PROCEDURE — 86860 RBC ANTIBODY ELUTION: CPT

## 2022-08-06 PROCEDURE — 86922 COMPATIBILITY TEST ANTIGLOB: CPT

## 2022-08-06 PROCEDURE — 74011250636 HC RX REV CODE- 250/636: Performed by: STUDENT IN AN ORGANIZED HEALTH CARE EDUCATION/TRAINING PROGRAM

## 2022-08-06 PROCEDURE — 93005 ELECTROCARDIOGRAM TRACING: CPT

## 2022-08-06 PROCEDURE — 81001 URINALYSIS AUTO W/SCOPE: CPT

## 2022-08-06 PROCEDURE — 65270000029 HC RM PRIVATE

## 2022-08-06 PROCEDURE — 96375 TX/PRO/DX INJ NEW DRUG ADDON: CPT

## 2022-08-06 PROCEDURE — 86880 COOMBS TEST DIRECT: CPT

## 2022-08-06 PROCEDURE — C9113 INJ PANTOPRAZOLE SODIUM, VIA: HCPCS | Performed by: EMERGENCY MEDICINE

## 2022-08-06 PROCEDURE — 86920 COMPATIBILITY TEST SPIN: CPT

## 2022-08-06 PROCEDURE — 86921 COMPATIBILITY TEST INCUBATE: CPT

## 2022-08-06 PROCEDURE — 84484 ASSAY OF TROPONIN QUANT: CPT

## 2022-08-06 RX ORDER — CYANOCOBALAMIN 1000 UG/ML
1000 INJECTION, SOLUTION INTRAMUSCULAR; SUBCUTANEOUS
COMMUNITY

## 2022-08-06 RX ORDER — NAPROXEN 500 MG/1
TABLET ORAL
COMMUNITY
Start: 2022-07-28 | End: 2022-08-09

## 2022-08-06 RX ORDER — ONDANSETRON 4 MG/1
4 TABLET, ORALLY DISINTEGRATING ORAL
Status: DISCONTINUED | OUTPATIENT
Start: 2022-08-06 | End: 2022-08-09 | Stop reason: HOSPADM

## 2022-08-06 RX ORDER — DULOXETIN HYDROCHLORIDE 30 MG/1
CAPSULE, DELAYED RELEASE ORAL
COMMUNITY
Start: 2022-07-13

## 2022-08-06 RX ORDER — POLYETHYLENE GLYCOL 3350 17 G/17G
17 POWDER, FOR SOLUTION ORAL DAILY PRN
Status: DISCONTINUED | OUTPATIENT
Start: 2022-08-06 | End: 2022-08-09 | Stop reason: HOSPADM

## 2022-08-06 RX ORDER — SODIUM CHLORIDE 9 MG/ML
250 INJECTION, SOLUTION INTRAVENOUS AS NEEDED
Status: DISCONTINUED | OUTPATIENT
Start: 2022-08-06 | End: 2022-08-09 | Stop reason: HOSPADM

## 2022-08-06 RX ORDER — ACETAMINOPHEN 325 MG/1
650 TABLET ORAL
Status: DISCONTINUED | OUTPATIENT
Start: 2022-08-06 | End: 2022-08-09 | Stop reason: HOSPADM

## 2022-08-06 RX ORDER — ACETAMINOPHEN 650 MG/1
650 SUPPOSITORY RECTAL
Status: DISCONTINUED | OUTPATIENT
Start: 2022-08-06 | End: 2022-08-09 | Stop reason: HOSPADM

## 2022-08-06 RX ORDER — AMLODIPINE BESYLATE 10 MG/1
10 TABLET ORAL DAILY
COMMUNITY
Start: 2022-07-13

## 2022-08-06 RX ORDER — AMLODIPINE BESYLATE 5 MG/1
10 TABLET ORAL DAILY
Status: DISCONTINUED | OUTPATIENT
Start: 2022-08-07 | End: 2022-08-09 | Stop reason: HOSPADM

## 2022-08-06 RX ORDER — SODIUM CHLORIDE 0.9 % (FLUSH) 0.9 %
5-40 SYRINGE (ML) INJECTION AS NEEDED
Status: DISCONTINUED | OUTPATIENT
Start: 2022-08-06 | End: 2022-08-09 | Stop reason: HOSPADM

## 2022-08-06 RX ORDER — HYDRALAZINE HYDROCHLORIDE 20 MG/ML
10 INJECTION INTRAMUSCULAR; INTRAVENOUS ONCE
Status: COMPLETED | OUTPATIENT
Start: 2022-08-06 | End: 2022-08-06

## 2022-08-06 RX ORDER — METOPROLOL SUCCINATE 50 MG/1
100 TABLET, EXTENDED RELEASE ORAL DAILY
Status: DISCONTINUED | OUTPATIENT
Start: 2022-08-07 | End: 2022-08-09 | Stop reason: HOSPADM

## 2022-08-06 RX ORDER — DULOXETIN HYDROCHLORIDE 30 MG/1
30 CAPSULE, DELAYED RELEASE ORAL DAILY
Status: DISCONTINUED | OUTPATIENT
Start: 2022-08-07 | End: 2022-08-09 | Stop reason: HOSPADM

## 2022-08-06 RX ORDER — SODIUM CHLORIDE 0.9 % (FLUSH) 0.9 %
5-40 SYRINGE (ML) INJECTION EVERY 8 HOURS
Status: DISCONTINUED | OUTPATIENT
Start: 2022-08-06 | End: 2022-08-09 | Stop reason: HOSPADM

## 2022-08-06 RX ORDER — METOPROLOL SUCCINATE 100 MG/1
100 TABLET, EXTENDED RELEASE ORAL DAILY
COMMUNITY
Start: 2022-07-13

## 2022-08-06 RX ORDER — ONDANSETRON 2 MG/ML
4 INJECTION INTRAMUSCULAR; INTRAVENOUS
Status: DISCONTINUED | OUTPATIENT
Start: 2022-08-06 | End: 2022-08-09 | Stop reason: HOSPADM

## 2022-08-06 RX ORDER — SODIUM CHLORIDE 9 MG/ML
75 INJECTION, SOLUTION INTRAVENOUS CONTINUOUS
Status: DISCONTINUED | OUTPATIENT
Start: 2022-08-06 | End: 2022-08-09

## 2022-08-06 RX ADMIN — SODIUM CHLORIDE, PRESERVATIVE FREE 10 ML: 5 INJECTION INTRAVENOUS at 22:00

## 2022-08-06 RX ADMIN — ACETAMINOPHEN 650 MG: 325 TABLET ORAL at 21:08

## 2022-08-06 RX ADMIN — SODIUM CHLORIDE 75 ML/HR: 9 INJECTION, SOLUTION INTRAVENOUS at 21:11

## 2022-08-06 RX ADMIN — HYDRALAZINE HYDROCHLORIDE 10 MG: 20 INJECTION INTRAMUSCULAR; INTRAVENOUS at 16:33

## 2022-08-06 RX ADMIN — SODIUM CHLORIDE 80 MG: 9 INJECTION INTRAMUSCULAR; INTRAVENOUS; SUBCUTANEOUS at 19:48

## 2022-08-06 NOTE — ED TRIAGE NOTES
Patient reports 2-3 week hx of dizziness, lightheadedness, and SOB on exertion. Patient reports black stools that began today. Patient states she has a hx of hemmroids and had a bleeding hemmroid recently that she had to apply pressure to stop the bleeding.

## 2022-08-06 NOTE — ED PROVIDER NOTES
Date of Service:  1/7/9002    Patient:  Suzie Jade    Chief Complaint:  Melena       HPI:  Suzie Jade is a 78 y.o.  female who presents for evaluation of general fatigue, shortness of breath and dark stool. Patient states for 3 weeks she has had exertional dyspnea. She also notes some exertional lightheadedness and dizziness noting that she is almost passed out several times. No chest pain except for a very brief moment earlier today that only lasted for seconds. Shortness of breath is with exertion. No body aches, no other pain. Patient notes some dark stool this morning. No use of iron supplements or GI medicines. History of hemorrhoids. Otherwise patient states that she just feels generally fatigued and cannot seem to get any energy. Past Medical History:   Diagnosis Date    Anemia     Autoimmune disease ([address]     Cancer ([facility] [address] 2015    Mass on lung. Hx of mammogram 03/08/2016    normal    Hypertension     Monoclonal gammopathies     Pap smear for cervical cancer screening 04/25/2013    negative    Stroke ([facility] [address]     Unsure of date. Years ago.        Past Surgical History:   Procedure Laterality Date    BREAST SURGERY PROCEDURE UNLISTED  1982    LEFT breast lump    HX HEENT      Lens implants    HX HEMORRHOIDECTOMY  1989    HX OTHER SURGICAL Right 09/2015    right upper lobe of lung removed due to cancer    [address]    HX WRIST FRACTURE [address]    \"cyst in wrist\"         Family History:   Problem Relation Age of Onset    Heart Disease Father     Heart Disease Sister     Diabetes Sister     Hypertension Sister     Heart Disease Brother     Heart Disease Sister     Diabetes Sister     Hypertension Sister     Heart Disease Sister     Diabetes Sister     Hypertension Sister     Hypertension Sister     Hypertension Sister     Hypertension Sister     Hypertension Brother     Hypertension Brother        Social History     Socioeconomic History    Marital status:      Spouse name: Not on file    Number of children: Not on file    Years of education: Not on file    Highest education level: Not on file   Occupational History    Not on file   Tobacco Use    Smoking status: Never    Smokeless tobacco: Never   Substance and Sexual Activity    Alcohol use: Yes     Comment: social    Drug use: No    Sexual activity: Never   Other Topics Concern    Not on file   Social History Narrative    Not on file     Social Determinants of Health     Financial Resource Strain: Not on file   Food Insecurity: Not on file   Transportation Needs: Not on file   Physical Activity: Not on file   Stress: Not on file   Social Connections: Not on file   Intimate Partner Violence: Not on file   Housing Stability: Not on file         ALLERGIES: Codeine, Corticosteroids (glucocorticoids), Losartan, Other medication, and Statins-hmg-coa reductase inhibitors    Review of Systems   Constitutional:  Positive for fatigue. Respiratory:  Positive for shortness of breath (exertional). Gastrointestinal:         Dark stool   Neurological:  Positive for dizziness and light-headedness. All other systems reviewed and are negative. Vitals:    08/06/22 1523 08/06/22 1600   BP: (!) 194/75 (!) 215/90   Pulse: 69 77   Resp: 10 11   Temp: 97.9 °F (36.6 °C)    SpO2: 100% 100%   Weight: 65.8 kg (145 lb)    Height: 5' 3\" (1.6 m)             Physical Exam  Vitals and nursing note reviewed. Constitutional:       Appearance: Normal appearance. HENT:      Head: Normocephalic and atraumatic. Nose: Nose normal.      Mouth/Throat:      Mouth: Mucous membranes are moist.   Eyes:      General: No scleral icterus. Cardiovascular:      Rate and Rhythm: Normal rate and regular rhythm. Pulmonary:      Effort: Pulmonary effort is normal.      Breath sounds: Normal breath sounds. Abdominal:      General: Abdomen is flat.       Tenderness: no abdominal tenderness   Musculoskeletal:         General: No deformity. Skin:     General: Skin is warm. Capillary Refill: Capillary refill takes less than 2 seconds. Coloration: Skin is not pale. Neurological:      Mental Status: She is alert and oriented to person, place, and time. Psychiatric:         Mood and Affect: Mood normal.        MDM     Amount and/or Complexity of Data Reviewed  Decide to obtain previous medical records or to obtain history from someone other than the patient: yes      ED Course as of 08/06/22 2130   Sat Aug 06, 2022   1539 EKG 1528  Normal sinus rhythm, 70 bpm.  Normal axis. Normal intervals. Nonspecific T wave abnormalities. No STEMI. [GG]   1605 HGB(!): 9.6 [GG]   1902 Occult blood, stool(!): Positive [GG]      ED Course User Index  [GG] Vivek Briscoe DO     VITAL SIGNS:  Patient Vitals for the past 4 hrs:   Temp Pulse Resp BP SpO2   08/06/22 1600 -- 77 11 (!) 215/90 100 %   08/06/22 1523 97.9 °F (36.6 °C) 69 10 (!) 194/75 100 %         LABS:  Recent Results (from the past 6 hour(s))   CBC WITH AUTOMATED DIFF    Collection Time: 08/06/22  3:32 PM   Result Value Ref Range    WBC 8.5 3.6 - 11.0 K/uL    RBC 3.09 (L) 3.80 - 5.20 M/uL    HGB 9.6 (L) 11.5 - 16.0 g/dL    HCT 28.1 (L) 35.0 - 47.0 %    MCV 90.9 80.0 - 99.0 FL    MCH 31.1 26.0 - 34.0 PG    MCHC 34.2 30.0 - 36.5 g/dL    RDW 14.4 11.5 - 14.5 %    PLATELET 596 208 - 006 K/uL    MPV 11.3 8.9 - 12.9 FL    NRBC 0.0 0  WBC    ABSOLUTE NRBC 0.00 0.00 - 0.01 K/uL    NEUTROPHILS 51 32 - 75 %    LYMPHOCYTES 40 12 - 49 %    MONOCYTES 6 5 - 13 %    EOSINOPHILS 2 0 - 7 %    BASOPHILS 1 0 - 1 %    IMMATURE GRANULOCYTES 0 0.0 - 0.5 %    ABS. NEUTROPHILS 4.4 1.8 - 8.0 K/UL    ABS. LYMPHOCYTES 3.4 0.8 - 3.5 K/UL    ABS. MONOCYTES 0.5 0.0 - 1.0 K/UL    ABS. EOSINOPHILS 0.2 0.0 - 0.4 K/UL    ABS. BASOPHILS 0.0 0.0 - 0.1 K/UL    ABS. IMM.  GRANS. 0.0 0.00 - 0.04 K/UL    DF AUTOMATED     METABOLIC PANEL, COMPREHENSIVE    Collection Time: 08/06/22  3:32 PM   Result Value Ref Range    Sodium 133 (L) 136 - 145 mmol/L    Potassium 3.8 3.5 - 5.1 mmol/L    Chloride 103 97 - 108 mmol/L    CO2 22 21 - 32 mmol/L    Anion gap 8 5 - 15 mmol/L    Glucose 138 (H) 65 - 100 mg/dL    BUN 38 (H) 6 - 20 MG/DL    Creatinine 1.03 (H) 0.55 - 1.02 MG/DL    BUN/Creatinine ratio 37 (H) 12 - 20      GFR est AA >60 >60 ml/min/1.73m2    GFR est non-AA 51 (L) >60 ml/min/1.73m2    Calcium 9.0 8.5 - 10.1 MG/DL    Bilirubin, total 0.3 0.2 - 1.0 MG/DL    ALT (SGPT) 10 (L) 12 - 78 U/L    AST (SGOT) 11 (L) 15 - 37 U/L    Alk. phosphatase 55 45 - 117 U/L    Protein, total 9.9 (H) 6.4 - 8.2 g/dL    Albumin 3.2 (L) 3.5 - 5.0 g/dL    Globulin 6.7 (H) 2.0 - 4.0 g/dL    A-G Ratio 0.5 (L) 1.1 - 2.2     TROPONIN-HIGH SENSITIVITY    Collection Time: 08/06/22  3:32 PM   Result Value Ref Range    Troponin-High Sensitivity 5 0 - 51 ng/L   SAMPLES BEING HELD    Collection Time: 08/06/22  3:32 PM   Result Value Ref Range    SAMPLES BEING HELD  1SST, 1BLU     COMMENT        Add-on orders for these samples will be processed based on acceptable specimen integrity and analyte stability, which may vary by analyte. URINALYSIS W/ RFLX MICROSCOPIC    Collection Time: 08/06/22  5:21 PM   Result Value Ref Range    Color YELLOW/STRAW      Appearance CLEAR CLEAR      Specific gravity 1.010 1.003 - 1.030      pH (UA) 6.0 5.0 - 8.0      Protein Negative NEG mg/dL    Glucose Negative NEG mg/dL    Ketone Negative NEG mg/dL    Bilirubin Negative NEG      Blood Negative NEG      Urobilinogen 0.2 0.2 - 1.0 EU/dL    Nitrites Negative NEG      Leukocyte Esterase SMALL (A) NEG      WBC 0-4 0 - 4 /hpf    RBC 0-5 0 - 5 /hpf    Epithelial cells FEW FEW /lpf    Bacteria Negative NEG /hpf   URINE CULTURE HOLD SAMPLE    Collection Time: 08/06/22  5:21 PM    Specimen: Serum   Result Value Ref Range    Urine culture hold        Urine on hold in Microbiology dept for 2 days.   If unpreserved urine is submitted, it cannot be used for addtional testing after 24 hours, recollection will be required. OCCULT BLOOD, STOOL    Collection Time: 08/06/22  6:33 PM   Result Value Ref Range    Occult blood, stool Positive (A) NEG          IMAGING:  XR CHEST PA LAT   Final Result   No acute cardiopulmonary process. Medications During Visit:  Medications   pantoprazole (PROTONIX) 80 mg in 0.9% sodium chloride 20 mL injection (has no administration in time range)   hydrALAZINE (APRESOLINE) 20 mg/mL injection 10 mg (10 mg IntraVENous Given 8/6/22 0707)         DECISION MAKING:  Armando Owens is a 78 y.o. female who comes in as above. Stable patient however exertional dyspnea. Unsure if this GI bleed is acute versus chronic that is just gotten to the point where she is symptomatic but given the degree of shortness of breath that she is having and the fact that she lives up 2 flights of stairs, admit the patient for continued monitoring, GI consult. IMPRESSION:  1. Gastrointestinal hemorrhage, unspecified gastrointestinal hemorrhage type    2. Symptomatic anemia        DISPOSITION:  Admitted        Procedures      Perfect Serve Consult for Admission  7:15 PM    ED Room Number: ER04/04  Patient Name and age:  Armando Owens 78 y.o.  female  Working Diagnosis:   1. Gastrointestinal hemorrhage, unspecified gastrointestinal hemorrhage type    2. Symptomatic anemia        COVID-19 Suspicion:  no  Sepsis present:  no  Reassessment needed: no  Code Status:  Full Code  Readmission: no  Isolation Requirements:  no  Recommended Level of Care:  telemetry  Department:[facility] ED - [phone]  Other:  Upper GI bleed, stable. Symptomatic.  Will need non emergent GI consult in AM

## 2022-08-07 ENCOUNTER — APPOINTMENT (OUTPATIENT)
Dept: CT IMAGING | Age: 82
DRG: 378 | End: 2022-08-07
Attending: HOSPITALIST
Payer: MEDICARE

## 2022-08-07 LAB
ANION GAP SERPL CALC-SCNC: 6 MMOL/L (ref 5–15)
ANION GAP SERPL CALC-SCNC: 7 MMOL/L (ref 5–15)
BASOPHILS # BLD: 0 K/UL (ref 0–0.1)
BASOPHILS NFR BLD: 0 % (ref 0–1)
BUN SERPL-MCNC: 13 MG/DL (ref 6–20)
BUN SERPL-MCNC: 23 MG/DL (ref 6–20)
BUN/CREAT SERPL: 15 (ref 12–20)
BUN/CREAT SERPL: 26 (ref 12–20)
CALCIUM SERPL-MCNC: 7.8 MG/DL (ref 8.5–10.1)
CALCIUM SERPL-MCNC: 8.3 MG/DL (ref 8.5–10.1)
CHLORIDE SERPL-SCNC: 100 MMOL/L (ref 97–108)
CHLORIDE SERPL-SCNC: 96 MMOL/L (ref 97–108)
CO2 SERPL-SCNC: 21 MMOL/L (ref 21–32)
CO2 SERPL-SCNC: 23 MMOL/L (ref 21–32)
CREAT SERPL-MCNC: 0.85 MG/DL (ref 0.55–1.02)
CREAT SERPL-MCNC: 0.87 MG/DL (ref 0.55–1.02)
DIFFERENTIAL METHOD BLD: ABNORMAL
EOSINOPHIL # BLD: 0 K/UL (ref 0–0.4)
EOSINOPHIL NFR BLD: 1 % (ref 0–7)
ERYTHROCYTE [DISTWIDTH] IN BLOOD BY AUTOMATED COUNT: 14.2 % (ref 11.5–14.5)
ERYTHROCYTE [DISTWIDTH] IN BLOOD BY AUTOMATED COUNT: 14.2 % (ref 11.5–14.5)
GLUCOSE SERPL-MCNC: 131 MG/DL (ref 65–100)
GLUCOSE SERPL-MCNC: 139 MG/DL (ref 65–100)
HCT VFR BLD AUTO: 24.1 % (ref 35–47)
HCT VFR BLD AUTO: 25.9 % (ref 35–47)
HGB BLD-MCNC: 8.3 G/DL (ref 11.5–16)
HGB BLD-MCNC: 9.1 G/DL (ref 11.5–16)
IMM GRANULOCYTES # BLD AUTO: 0 K/UL (ref 0–0.04)
IMM GRANULOCYTES NFR BLD AUTO: 1 % (ref 0–0.5)
LYMPHOCYTES # BLD: 2 K/UL (ref 0.8–3.5)
LYMPHOCYTES NFR BLD: 31 % (ref 12–49)
MCH RBC QN AUTO: 31 PG (ref 26–34)
MCH RBC QN AUTO: 31.2 PG (ref 26–34)
MCHC RBC AUTO-ENTMCNC: 34.4 G/DL (ref 30–36.5)
MCHC RBC AUTO-ENTMCNC: 35.1 G/DL (ref 30–36.5)
MCV RBC AUTO: 88.7 FL (ref 80–99)
MCV RBC AUTO: 89.9 FL (ref 80–99)
MONOCYTES # BLD: 0.2 K/UL (ref 0–1)
MONOCYTES NFR BLD: 4 % (ref 5–13)
NEUTS SEG # BLD: 4.1 K/UL (ref 1.8–8)
NEUTS SEG NFR BLD: 63 % (ref 32–75)
NRBC # BLD: 0 K/UL (ref 0–0.01)
NRBC # BLD: 0 K/UL (ref 0–0.01)
NRBC BLD-RTO: 0 PER 100 WBC
NRBC BLD-RTO: 0 PER 100 WBC
PLATELET # BLD AUTO: 138 K/UL (ref 150–400)
PLATELET # BLD AUTO: 165 K/UL (ref 150–400)
PMV BLD AUTO: 10.8 FL (ref 8.9–12.9)
PMV BLD AUTO: 11.2 FL (ref 8.9–12.9)
POTASSIUM SERPL-SCNC: 3.2 MMOL/L (ref 3.5–5.1)
POTASSIUM SERPL-SCNC: 3.8 MMOL/L (ref 3.5–5.1)
RBC # BLD AUTO: 2.68 M/UL (ref 3.8–5.2)
RBC # BLD AUTO: 2.92 M/UL (ref 3.8–5.2)
SODIUM SERPL-SCNC: 124 MMOL/L (ref 136–145)
SODIUM SERPL-SCNC: 129 MMOL/L (ref 136–145)
WBC # BLD AUTO: 5.6 K/UL (ref 3.6–11)
WBC # BLD AUTO: 6.4 K/UL (ref 3.6–11)

## 2022-08-07 PROCEDURE — 74011250637 HC RX REV CODE- 250/637: Performed by: HOSPITALIST

## 2022-08-07 PROCEDURE — 36415 COLL VENOUS BLD VENIPUNCTURE: CPT

## 2022-08-07 PROCEDURE — 74011250636 HC RX REV CODE- 250/636: Performed by: HOSPITALIST

## 2022-08-07 PROCEDURE — 85027 COMPLETE CBC AUTOMATED: CPT

## 2022-08-07 PROCEDURE — 74011000250 HC RX REV CODE- 250: Performed by: STUDENT IN AN ORGANIZED HEALTH CARE EDUCATION/TRAINING PROGRAM

## 2022-08-07 PROCEDURE — 74011250636 HC RX REV CODE- 250/636: Performed by: STUDENT IN AN ORGANIZED HEALTH CARE EDUCATION/TRAINING PROGRAM

## 2022-08-07 PROCEDURE — 80048 BASIC METABOLIC PNL TOTAL CA: CPT

## 2022-08-07 PROCEDURE — 85025 COMPLETE CBC W/AUTO DIFF WBC: CPT

## 2022-08-07 PROCEDURE — 74011000258 HC RX REV CODE- 258: Performed by: HOSPITALIST

## 2022-08-07 PROCEDURE — C9113 INJ PANTOPRAZOLE SODIUM, VIA: HCPCS | Performed by: STUDENT IN AN ORGANIZED HEALTH CARE EDUCATION/TRAINING PROGRAM

## 2022-08-07 PROCEDURE — 74177 CT ABD & PELVIS W/CONTRAST: CPT

## 2022-08-07 PROCEDURE — 74011250637 HC RX REV CODE- 250/637: Performed by: STUDENT IN AN ORGANIZED HEALTH CARE EDUCATION/TRAINING PROGRAM

## 2022-08-07 PROCEDURE — 2709999900 HC NON-CHARGEABLE SUPPLY

## 2022-08-07 PROCEDURE — 65270000029 HC RM PRIVATE

## 2022-08-07 PROCEDURE — 74011000636 HC RX REV CODE- 636: Performed by: STUDENT IN AN ORGANIZED HEALTH CARE EDUCATION/TRAINING PROGRAM

## 2022-08-07 RX ORDER — MORPHINE SULFATE 2 MG/ML
1 INJECTION, SOLUTION INTRAMUSCULAR; INTRAVENOUS
Status: DISCONTINUED | OUTPATIENT
Start: 2022-08-07 | End: 2022-08-09 | Stop reason: HOSPADM

## 2022-08-07 RX ORDER — MORPHINE SULFATE 2 MG/ML
1 INJECTION, SOLUTION INTRAMUSCULAR; INTRAVENOUS ONCE
Status: COMPLETED | OUTPATIENT
Start: 2022-08-07 | End: 2022-08-07

## 2022-08-07 RX ORDER — ZOLPIDEM TARTRATE 5 MG/1
5 TABLET ORAL
Status: DISCONTINUED | OUTPATIENT
Start: 2022-08-07 | End: 2022-08-09 | Stop reason: HOSPADM

## 2022-08-07 RX ORDER — HYDRALAZINE HYDROCHLORIDE 20 MG/ML
10 INJECTION INTRAMUSCULAR; INTRAVENOUS
Status: DISCONTINUED | OUTPATIENT
Start: 2022-08-07 | End: 2022-08-09 | Stop reason: HOSPADM

## 2022-08-07 RX ORDER — POTASSIUM CHLORIDE 7.45 MG/ML
10 INJECTION INTRAVENOUS
Status: COMPLETED | OUTPATIENT
Start: 2022-08-07 | End: 2022-08-07

## 2022-08-07 RX ORDER — PROCHLORPERAZINE EDISYLATE 5 MG/ML
10 INJECTION INTRAMUSCULAR; INTRAVENOUS
Status: DISCONTINUED | OUTPATIENT
Start: 2022-08-07 | End: 2022-08-09 | Stop reason: HOSPADM

## 2022-08-07 RX ADMIN — POTASSIUM CHLORIDE 10 MEQ: 7.46 INJECTION, SOLUTION INTRAVENOUS at 14:09

## 2022-08-07 RX ADMIN — SODIUM CHLORIDE 75 ML/HR: 9 INJECTION, SOLUTION INTRAVENOUS at 11:53

## 2022-08-07 RX ADMIN — MORPHINE SULFATE 1 MG: 2 INJECTION, SOLUTION INTRAMUSCULAR; INTRAVENOUS at 01:07

## 2022-08-07 RX ADMIN — SODIUM CHLORIDE 40 MG: 9 INJECTION INTRAMUSCULAR; INTRAVENOUS; SUBCUTANEOUS at 06:25

## 2022-08-07 RX ADMIN — ZOLPIDEM TARTRATE 5 MG: 5 TABLET ORAL at 01:08

## 2022-08-07 RX ADMIN — AMLODIPINE BESYLATE 10 MG: 5 TABLET ORAL at 09:22

## 2022-08-07 RX ADMIN — SODIUM CHLORIDE, PRESERVATIVE FREE 10 ML: 5 INJECTION INTRAVENOUS at 13:09

## 2022-08-07 RX ADMIN — IOPAMIDOL 100 ML: 755 INJECTION, SOLUTION INTRAVENOUS at 14:14

## 2022-08-07 RX ADMIN — ACETAMINOPHEN 650 MG: 325 TABLET ORAL at 07:11

## 2022-08-07 RX ADMIN — ZOLPIDEM TARTRATE 5 MG: 5 TABLET ORAL at 21:33

## 2022-08-07 RX ADMIN — POTASSIUM CHLORIDE 10 MEQ: 7.46 INJECTION, SOLUTION INTRAVENOUS at 16:01

## 2022-08-07 RX ADMIN — ONDANSETRON 4 MG: 2 INJECTION INTRAMUSCULAR; INTRAVENOUS at 07:11

## 2022-08-07 RX ADMIN — SODIUM CHLORIDE 40 MG: 9 INJECTION INTRAMUSCULAR; INTRAVENOUS; SUBCUTANEOUS at 21:33

## 2022-08-07 RX ADMIN — PROCHLORPERAZINE EDISYLATE 10 MG: 5 INJECTION INTRAMUSCULAR; INTRAVENOUS at 11:46

## 2022-08-07 RX ADMIN — SODIUM CHLORIDE, PRESERVATIVE FREE 10 ML: 5 INJECTION INTRAVENOUS at 21:33

## 2022-08-07 RX ADMIN — POTASSIUM CHLORIDE 10 MEQ: 7.46 INJECTION, SOLUTION INTRAVENOUS at 12:58

## 2022-08-07 RX ADMIN — POTASSIUM CHLORIDE 10 MEQ: 7.46 INJECTION, SOLUTION INTRAVENOUS at 11:22

## 2022-08-07 RX ADMIN — SODIUM CHLORIDE, PRESERVATIVE FREE 10 ML: 5 INJECTION INTRAVENOUS at 06:00

## 2022-08-07 RX ADMIN — MORPHINE SULFATE 1 MG: 2 INJECTION, SOLUTION INTRAMUSCULAR; INTRAVENOUS at 11:46

## 2022-08-07 RX ADMIN — ONDANSETRON 4 MG: 2 INJECTION INTRAMUSCULAR; INTRAVENOUS at 01:13

## 2022-08-07 RX ADMIN — METOPROLOL SUCCINATE 100 MG: 50 TABLET, EXTENDED RELEASE ORAL at 09:22

## 2022-08-07 RX ADMIN — PIPERACILLIN AND TAZOBACTAM 4.5 G: 4; .5 INJECTION, POWDER, FOR SOLUTION INTRAVENOUS at 17:34

## 2022-08-07 NOTE — PROGRESS NOTES
Bedside and Verbal shift change report given to Andres Guajardo (oncoming nurse) by Preston Uriostegui (offgoing nurse). Report included the following information SBAR, Kardex, Intake/Output, MAR, Recent Results, Med Rec Status, and Cardiac Rhythm NSR .

## 2022-08-07 NOTE — ED NOTES
TRANSFER - OUT REPORT:    Verbal report given to Elke LIEBERMAN (name) on Sotero Danielle  being transferred to [id number] (unit) for routine progression of care       Report consisted of patients Situation, Background, Assessment and   Recommendations(SBAR). Information from the following report(s) SBAR, Kardex, ED Summary, MAR, Recent Results, and Med Rec Status was reviewed with the receiving nurse. Lines:   Peripheral IV 08/06/22 Left Antecubital (Active)   Site Assessment Clean, dry, & intact 08/06/22 1530   Phlebitis Assessment 0 08/06/22 1530   Infiltration Assessment 0 08/06/22 1530   Dressing Status Clean, dry, & intact 08/06/22 1530   Hub Color/Line Status Pink 08/06/22 1530   Alcohol Cap Used Yes 08/06/22 1530        Opportunity for questions and clarification was provided.       Patient transported with:   Registered Nurse

## 2022-08-07 NOTE — H&P
History & Physical    Primary Care Provider: Geri Barker MD  Source of Information: Patient and chart review    History of Presenting Illness:   Escobar Dougherty is a 78 y.o. female with history of multiple myeloma, history of right lung cancer status post lobectomy, hypertension, B12 deficiency, neuropathic pain, CKD stage II who presents to hospital with complaints of dizziness, lightheadedness and dyspnea with minimal exertion. Patient also reports dark starry stools over the last month. The patient denies any fever, chills, chest or abdominal pain, nausea, vomiting, cough, congestion, recent illness, palpitations, or dysuria. Remarkable vitals on ER Presentations: hr to 108, bp to 215/90  Labs Remarkable for: hgb 9.6, FOBT positive  ER Images: Chest x-ray showed no acute process. ER treatment included hydralazine 20 mg IV and Protonix 80 mg IV. Review of Systems:  Pertinent items are noted in the History of Present Illness. Past Medical History:   Diagnosis Date    Anemia     Autoimmune disease ([facility] [address]     Cancer ([facility] [address] 2015    Mass on lung. Hx of mammogram 03/08/2016    normal    Hypertension     Monoclonal gammopathies     Pap smear for cervical cancer screening 04/25/2013    negative    Stroke ([facility] [address]     Unsure of date. Years ago. Past Surgical History:   Procedure Laterality Date    BREAST SURGERY PROCEDURE UNLISTED  1982    LEFT breast lump    HX HEENT      Lens implants    HX HEMORRHOIDECTOMY  1989    HX OTHER SURGICAL Right 09/2015    right upper lobe of lung removed due to cancer    HX TONSILLECTOMY  1962    HX TUBAL LIGATION  1969    HX WRIST FRACTURE [address]    \"cyst in wrist\"     Prior to Admission medications    Medication Sig Start Date End Date Taking?  Authorizing Provider   cyanocobalamin (VITAMIN B12) 1,000 mcg/mL injection INJECT 1 ML IN THE MUSCLE EVERY 30 DAYS 2/10/20   Nam Lyles MD   Safety [place] 25 x 5/8 \" ndle 1 Each by Does Not Apply route every thirty (30) days. 7/19/19   Dominic Alejandro MD   Syringe, Disposable, 3 mL syrg 1 Each by Does Not Apply route every thirty (30) days. 7/19/19   Malena Vicente MD   diazePAM (VALIUM) 5 mg tablet Take 1 Tab by mouth every twelve (12) hours as needed (vertigo). Max Daily Amount: 10 mg. 5/12/19   Yusra New MD   NIFEdipine ER (ADALAT CC) 30 mg ER tablet Take  by mouth daily. Provider, Historical   cloNIDine HCl (CATAPRES) 0.1 mg tablet Take 1 Tab by mouth two (2) times a day. 10/13/18   Magnant, Simpson Olea, MD   ibuprofen (MOTRIN) 200 mg tablet Take 200 mg by mouth as needed for Pain. Provider, Historical   metoprolol succinate (TOPROL-XL) 50 mg XL tablet Take  by mouth daily. Provider, Historical   losartan (COZAAR) 50 mg tablet Take  by mouth daily. Provider, Historical   zolpidem (AMBIEN) 10 mg tablet TK 1/2 A T PO QHS PRN 6/6/18   Provider, Historical   cholecalciferol, vitamin D3, (VITAMIN D3 PO) Take  by mouth. Provider, Historical   escitalopram oxalate (LEXAPRO) 10 mg tablet TK 1 T PO QD 5/10/18   Provider, Historical   scopolamine (TRANSDERM-SCOP) 1 mg over 3 days pt3d APPLY PATCH TRANSDERMALLY AS DIRECTED Q 3 DAYS 5/10/18   Provider, Historical   vitamin e (E GEMS) 1,000 unit capsule Take 1,000 Units by mouth daily. Provider, Historical   aspirin 81 mg tablet Take 81 mg by mouth.     Provider, Historical     Allergies   Allergen Reactions    Codeine Palpitations     Heart races    Corticosteroids (Glucocorticoids) Other (comments)     \"skin turns red, extremely tired\"    Losartan Other (comments)     Patient stopped taking due to recall     Other Medication Other (comments)     Steroids- \"skin turns red, extremely tired\"    Statins-Hmg-Coa Reductase Inhibitors Myalgia      Family History   Problem Relation Age of Onset    Heart Disease Father     Heart Disease Sister     Diabetes Sister     Hypertension Sister     Heart Disease Brother     Heart Disease Sister     Diabetes Sister     Hypertension Sister     Heart Disease Sister     Diabetes Sister     Hypertension Sister     Hypertension Sister     Hypertension Sister     Hypertension Sister     Hypertension Brother     Hypertension Brother         SOCIAL HISTORY:  Patient resides:  Independently x   Assisted Living    SNF    With family care       Smoking history:   None x   Former    Chronic      Alcohol history:   None x   Social    Chronic      Ambulates:   Independently x   w/cane    w/walker    w/wc    CODE STATUS:  DNR    Full x   Other      Objective:     Physical Exam:     Visit Vitals  BP (!) 158/61   Pulse 87   Temp 97.9 °F (36.6 °C)   Resp 15   Ht 5' 3\" (1.6 m)   Wt 65.8 kg (145 lb)   SpO2 99%   BMI 25.69 kg/m²      O2 Device: None (Room air)    General:  Alert, cooperative, no distress, appears stated age. Head:  Normocephalic, without obvious abnormality, atraumatic. Eyes:  Conjunctivae/corneas clear. PERRL, EOMs intact. Nose: Nares normal. Septum midline. Mucosa normal.        Neck: Supple, symmetrical, trachea midline,        Lungs:   Clear to auscultation bilaterally. Chest wall:  No tenderness or deformity. Heart:  Regular rate and rhythm, S1, S2 normal   Abdomen:   Soft, non-tender. Bowel sounds normal. No masses,  No organomegaly. Extremities: Extremities normal, atraumatic, no cyanosis or edema. Pulses: 2+ and symmetric all extremities. Skin: Skin color, texture, turgor normal. No rashes or lesions   Neurologic: CNII-XII intact. Data Review:     Recent Days:  Recent Labs     08/06/22  1532   WBC 8.5   HGB 9.6*   HCT 28.1*        Recent Labs     08/06/22  1532   *   K 3.8      CO2 22   *   BUN 38*   CREA 1.03*   CA 9.0   ALB 3.2*   ALT 10*     No results for input(s): PH, PCO2, PO2, HCO3, FIO2 in the last 72 hours.     24 Hour Results:  Recent Results (from the past 24 hour(s))   CBC WITH AUTOMATED DIFF    Collection Time: 08/06/22  3:32 PM Result Value Ref Range    WBC 8.5 3.6 - 11.0 K/uL    RBC 3.09 (L) 3.80 - 5.20 M/uL    HGB 9.6 (L) 11.5 - 16.0 g/dL    HCT 28.1 (L) 35.0 - 47.0 %    MCV 90.9 80.0 - 99.0 FL    MCH 31.1 26.0 - 34.0 PG    MCHC 34.2 30.0 - 36.5 g/dL    RDW 14.4 11.5 - 14.5 %    PLATELET 462 323 - 692 K/uL    MPV 11.3 8.9 - 12.9 FL    NRBC 0.0 0  WBC    ABSOLUTE NRBC 0.00 0.00 - 0.01 K/uL    NEUTROPHILS 51 32 - 75 %    LYMPHOCYTES 40 12 - 49 %    MONOCYTES 6 5 - 13 %    EOSINOPHILS 2 0 - 7 %    BASOPHILS 1 0 - 1 %    IMMATURE GRANULOCYTES 0 0.0 - 0.5 %    ABS. NEUTROPHILS 4.4 1.8 - 8.0 K/UL    ABS. LYMPHOCYTES 3.4 0.8 - 3.5 K/UL    ABS. MONOCYTES 0.5 0.0 - 1.0 K/UL    ABS. EOSINOPHILS 0.2 0.0 - 0.4 K/UL    ABS. BASOPHILS 0.0 0.0 - 0.1 K/UL    ABS. IMM. GRANS. 0.0 0.00 - 0.04 K/UL    DF AUTOMATED     METABOLIC PANEL, COMPREHENSIVE    Collection Time: 08/06/22  3:32 PM   Result Value Ref Range    Sodium 133 (L) 136 - 145 mmol/L    Potassium 3.8 3.5 - 5.1 mmol/L    Chloride 103 97 - 108 mmol/L    CO2 22 21 - 32 mmol/L    Anion gap 8 5 - 15 mmol/L    Glucose 138 (H) 65 - 100 mg/dL    BUN 38 (H) 6 - 20 MG/DL    Creatinine 1.03 (H) 0.55 - 1.02 MG/DL    BUN/Creatinine ratio 37 (H) 12 - 20      GFR est AA >60 >60 ml/min/1.73m2    GFR est non-AA 51 (L) >60 ml/min/1.73m2    Calcium 9.0 8.5 - 10.1 MG/DL    Bilirubin, total 0.3 0.2 - 1.0 MG/DL    ALT (SGPT) 10 (L) 12 - 78 U/L    AST (SGOT) 11 (L) 15 - 37 U/L    Alk.  phosphatase 55 45 - 117 U/L    Protein, total 9.9 (H) 6.4 - 8.2 g/dL    Albumin 3.2 (L) 3.5 - 5.0 g/dL    Globulin 6.7 (H) 2.0 - 4.0 g/dL    A-G Ratio 0.5 (L) 1.1 - 2.2     TROPONIN-HIGH SENSITIVITY    Collection Time: 08/06/22  3:32 PM   Result Value Ref Range    Troponin-High Sensitivity 5 0 - 51 ng/L   SAMPLES BEING HELD    Collection Time: 08/06/22  3:32 PM   Result Value Ref Range    SAMPLES BEING HELD  1SST, 1BLU     COMMENT        Add-on orders for these samples will be processed based on acceptable specimen integrity and analyte stability, which may vary by analyte. URINALYSIS W/ RFLX MICROSCOPIC    Collection Time: 08/06/22  5:21 PM   Result Value Ref Range    Color YELLOW/STRAW      Appearance CLEAR CLEAR      Specific gravity 1.010 1.003 - 1.030      pH (UA) 6.0 5.0 - 8.0      Protein Negative NEG mg/dL    Glucose Negative NEG mg/dL    Ketone Negative NEG mg/dL    Bilirubin Negative NEG      Blood Negative NEG      Urobilinogen 0.2 0.2 - 1.0 EU/dL    Nitrites Negative NEG      Leukocyte Esterase SMALL (A) NEG      WBC 0-4 0 - 4 /hpf    RBC 0-5 0 - 5 /hpf    Epithelial cells FEW FEW /lpf    Bacteria Negative NEG /hpf   URINE CULTURE HOLD SAMPLE    Collection Time: 08/06/22  5:21 PM    Specimen: Serum   Result Value Ref Range    Urine culture hold        Urine on hold in Microbiology dept for 2 days. If unpreserved urine is submitted, it cannot be used for addtional testing after 24 hours, recollection will be required. OCCULT BLOOD, STOOL    Collection Time: 08/06/22  6:33 PM   Result Value Ref Range    Occult blood, stool Positive (A) NEG           Imaging:     Assessment:     Zeferino Guerrero is a 78 y.o. female with history of multiple myeloma, history of right lung cancer status post lobectomy, hypertension, B12 deficiency, neuropathic pain, CKD stage II who is admitted for chronic gi bleed. Plan:       Chronic GI bleed /FOBT positive  -Admit to monitor on remote telemetry  -Continue with Protonix IV twice daily  -Clear liquid diet  -Iron panel, ferritin, B12 and folate  -CBC in a.m.  -GI consult    Hypertension  -Resume home metoprolol and norvasc    Major depressive disorder  -Continue home cymbalta    CKD stage III  -Stable. MIVF. Daily BMP.     Multiple myeloma /history of lung cancer  -Heme-onc follow-up outpatient          FEN/GI -  [email or web address]  Activity - as tolerated  DVT prophylaxis - SCDs  GI prophylaxis -  Protonix  Disposition - Home    CODE STATUS:  full code       Signed By: Dana Renee MD August 6, 2022

## 2022-08-07 NOTE — PROGRESS NOTES
PT NOTE - Informed by OT who checked and informed PT twice (11:30 am and 4 pm) that patient's pain not controlled and nurse and patient deferred therapy evals today. Will defer until tomorrow.   Barrington Tsai, PT, DPT

## 2022-08-07 NOTE — PROGRESS NOTES
[address] [address] [facility] Progress Note: Antimicrobial Stewardship    Automatic antibiotic dosing of Zosyn - extended infusion protocol  Indication: intra abdominal infection  Day of Therapy: 1    Non-Kinetic Antimicrobial Dosing:   Current Regimen:  2.25 gm every 8 hours  Adjustment to therapy: 4.5 gm x 1 dose followed by 3.375 gm infused over 4 hours every 8 hours     CrCl =/> 20  Pharmacy to follow daily. Serum Creatinine     Lab Results   Component Value Date/Time    Creatinine 0.87 08/07/2022 02:09 AM    Creatinine (POC) 1.1 09/08/2015 08:26 AM       Creatinine Clearance Estimated Creatinine Clearance: 43.9 mL/min (based on SCr of 0.87 mg/dL). Procalcitonin  No results found for: PCT     Temp   98.6 °F (37 °C)    WBC   Lab Results   Component Value Date/Time    WBC 5.6 08/07/2022 02:09 AM       For Antifungals, Metronidazole and Nafcillin: Lab Results   Component Value Date/Time    ALT (SGPT) 10 (L) 08/06/2022 03:32 PM    AST (SGOT) 11 (L) 08/06/2022 03:32 PM    Alk.  phosphatase 55 08/06/2022 03:32 PM    Bilirubin, total 0.3 08/06/2022 03:32 PM         Pharmacist: Signed Galindo Blank

## 2022-08-07 NOTE — ED NOTES
Spoke w/ Blood bank who stated that type and screen was being sent to [facility] for cross match.

## 2022-08-07 NOTE — CONSULTS
Consult Date: 8/7/2022    Consults    Subjective melena x3 days, Lside pain x 5 days. No N/V/fever. Last colonoscopy years ago and cannot remember by whom. Takes NSAID nightly    Past Medical History:   Diagnosis Date    Anemia     Autoimmune disease ([address]     Cancer ([address] 2015    Mass on lung. Hx of mammogram 03/08/2016    normal    Hypertension     Monoclonal gammopathies     Pap smear for cervical cancer screening 04/25/2013    negative    Stroke ([facility]     Unsure of date. Years ago.       Past Surgical History:   Procedure Laterality Date    BREAST SURGERY PROCEDURE UNLISTED  1982    LEFT breast lump    HX HEENT      Lens implants    HX HEMORRHOIDECTOMY  1989    HX OTHER SURGICAL Right 09/2015    right upper lobe of lung removed due to cancer    HX TONSILLECTOMY  1962    HX TUBAL LIGATION  1969    HX WRIST FRACTURE [address]    \"cyst in wrist\"     Family History   Problem Relation Age of Onset    Heart Disease Father     Heart Disease Sister     Diabetes Sister     Hypertension Sister     Heart Disease Brother     Heart Disease Sister     Diabetes Sister     Hypertension Sister     Heart Disease Sister     Diabetes Sister     Hypertension Sister     Hypertension Sister     Hypertension Sister     Hypertension Sister     Hypertension Brother     Hypertension Brother       Social History     Tobacco Use    Smoking status: Never    Smokeless tobacco: Never   Substance Use Topics    Alcohol use: Yes     Comment: social       Current Facility-Administered Medications   Medication Dose Route Frequency Provider Last Rate Last Admin    zolpidem (AMBIEN) tablet 5 mg  5 mg Oral QHS PRN Maryan Hickman MD   5 mg at 08/07/22 0108    hydrALAZINE (APRESOLINE) 20 mg/mL injection 10 mg  10 mg IntraVENous Q6H PRN Philip Murillo MD        potassium chloride 10 mEq in 100 ml IVPB  10 mEq IntraVENous Q1H Philip Murillo  mL/hr at 08/07/22 1122 10 mEq at 08/07/22 1122    prochlorperazine (COMPAZINE) injection 10 mg  10 mg IntraVENous Q6H PRN Terri Horowitz MD   10 mg at 08/07/22 1146    morphine injection 1 mg  1 mg IntraVENous Q6H PRN Terri Horowitz MD   1 mg at 08/07/22 1146    0.9% sodium chloride infusion 250 mL  250 mL IntraVENous PRN Jose Cruz Acevedo MD        sodium chloride (NS) flush 5-40 mL  5-40 mL IntraVENous Q8H Jose Cruz Acevedo MD   10 mL at 08/07/22 0600    sodium chloride (NS) flush 5-40 mL  5-40 mL IntraVENous PRN Jose Cruz Acevedo MD        acetaminophen (TYLENOL) tablet 650 mg  650 mg Oral Q6H PRN Jose Cruz Acevedo MD   650 mg at 08/07/22 0711    Or    acetaminophen (TYLENOL) suppository 650 mg  650 mg Rectal Q6H PRN Jose Cruz Acevedo MD        polyethylene glycol (MIRALAX) packet 17 g  17 g Oral DAILY PRN Jose Cruz Acevedo MD        ondansetron (ZOFRAN ODT) tablet 4 mg  4 mg Oral Q8H PRN Jose Cruz Acevedo MD        Or    ondansetron (ZOFRAN) injection 4 mg  4 mg IntraVENous Q6H PRN Jose Cruz Acevedo MD   4 mg at 08/07/22 0711    0.9% sodium chloride infusion  75 mL/hr IntraVENous CONTINUOUS Jose Cruz Acevedo MD 75 mL/hr at 08/07/22 1153 75 mL/hr at 08/07/22 1153    pantoprazole (PROTONIX) 40 mg in 0.9% sodium chloride 10 mL injection  40 mg IntraVENous Q12H Jose Cruz Acevedo MD   40 mg at 08/07/22 0625    amLODIPine (NORVASC) tablet 10 mg  10 mg Oral DAILY Jose Cruz Acevedo MD   10 mg at 08/07/22 1567    DULoxetine (CYMBALTA) capsule 30 mg  30 mg Oral DAILY Jose Cruz Acevedo MD        metoprolol succinate (TOPROL-XL) XL tablet 100 mg  100 mg Oral DAILY Jose Cruz Acevedo MD   100 mg at 08/07/22 9669        Review of Systems   Constitutional:  Positive for fatigue. HENT: Negative. Respiratory: Negative. Cardiovascular: Negative. Gastrointestinal:  Positive for abdominal pain and constipation. Endocrine: Positive for cold intolerance. Genitourinary: Negative. Musculoskeletal:  Positive for arthralgias. Neurological:  Positive for dizziness.      Objective     Vital signs for last 24 hours:  Visit Vitals  BP (!) 168/66 (BP 1 Location: Right upper arm, BP Patient Position: At rest)   Pulse 73   Temp 97.7 °F (36.5 °C)   Resp 16   Ht 5' 3\" (1.6 m)   Wt 65.8 kg (145 lb)   SpO2 97%   BMI 25.69 kg/m²       Intake/Output this shift:  Current Shift: 08/07 0701 - 08/07 1900  In: 327.5 [I.V.:327.5]  Out: -   Last 3 Shifts: 08/05 1901 - 08/07 0700  In: 375 [I.V.:375]  Out: -     Data Review:   Recent Results (from the past 24 hour(s))   CBC WITH AUTOMATED DIFF    Collection Time: 08/06/22  3:32 PM   Result Value Ref Range    WBC 8.5 3.6 - 11.0 K/uL    RBC 3.09 (L) 3.80 - 5.20 M/uL    HGB 9.6 (L) 11.5 - 16.0 g/dL    HCT 28.1 (L) 35.0 - 47.0 %    MCV 90.9 80.0 - 99.0 FL    MCH 31.1 26.0 - 34.0 PG    MCHC 34.2 30.0 - 36.5 g/dL    RDW 14.4 11.5 - 14.5 %    PLATELET 819 902 - 193 K/uL    MPV 11.3 8.9 - 12.9 FL    NRBC 0.0 0  WBC    ABSOLUTE NRBC 0.00 0.00 - 0.01 K/uL    NEUTROPHILS 51 32 - 75 %    LYMPHOCYTES 40 12 - 49 %    MONOCYTES 6 5 - 13 %    EOSINOPHILS 2 0 - 7 %    BASOPHILS 1 0 - 1 %    IMMATURE GRANULOCYTES 0 0.0 - 0.5 %    ABS. NEUTROPHILS 4.4 1.8 - 8.0 K/UL    ABS. LYMPHOCYTES 3.4 0.8 - 3.5 K/UL    ABS. MONOCYTES 0.5 0.0 - 1.0 K/UL    ABS. EOSINOPHILS 0.2 0.0 - 0.4 K/UL    ABS. BASOPHILS 0.0 0.0 - 0.1 K/UL    ABS. IMM. GRANS. 0.0 0.00 - 0.04 K/UL    DF AUTOMATED     METABOLIC PANEL, COMPREHENSIVE    Collection Time: 08/06/22  3:32 PM   Result Value Ref Range    Sodium 133 (L) 136 - 145 mmol/L    Potassium 3.8 3.5 - 5.1 mmol/L    Chloride 103 97 - 108 mmol/L    CO2 22 21 - 32 mmol/L    Anion gap 8 5 - 15 mmol/L    Glucose 138 (H) 65 - 100 mg/dL    BUN 38 (H) 6 - 20 MG/DL    Creatinine 1.03 (H) 0.55 - 1.02 MG/DL    BUN/Creatinine ratio 37 (H) 12 - 20      GFR est AA >60 >60 ml/min/1.73m2    GFR est non-AA 51 (L) >60 ml/min/1.73m2    Calcium 9.0 8.5 - 10.1 MG/DL    Bilirubin, total 0.3 0.2 - 1.0 MG/DL    ALT (SGPT) 10 (L) 12 - 78 U/L    AST (SGOT) 11 (L) 15 - 37 U/L    Alk.  phosphatase 55 45 - 117 U/L    Protein, total 9.9 (H) 6.4 - 8.2 g/dL    Albumin 3.2 (L) 3.5 - 5.0 g/dL    Globulin 6.7 (H) 2.0 - 4.0 g/dL    A-G Ratio 0.5 (L) 1.1 - 2.2     TROPONIN-HIGH SENSITIVITY    Collection Time: 08/06/22  3:32 PM   Result Value Ref Range    Troponin-High Sensitivity 5 0 - 51 ng/L   SAMPLES BEING HELD    Collection Time: 08/06/22  3:32 PM   Result Value Ref Range    SAMPLES BEING HELD  1SST, 1BLU     COMMENT        Add-on orders for these samples will be processed based on acceptable specimen integrity and analyte stability, which may vary by analyte. TYPE & SCREEN    Collection Time: 08/06/22  3:32 PM   Result Value Ref Range    Crossmatch Expiration 08/09/2022,2359     ABO/Rh(D) O NEGATIVE     Antibody screen POS     Antibody ID NONSPECIFIC ANTIBODY     HALI Poly POS     HALI IgG POS     ANTIBODY ELUTED [place]-Negative     HALI C3b/C3d NEG     Unit number [id number]     Blood component type RC LR     Unit division 00     Status of unit ALLOCATED     Crossmatch result Compatible     Unit number [id number]     Blood component type RC LR,2     Unit division 00     Status of unit ALLOCATED     Crossmatch result Compatible    URINALYSIS W/ RFLX MICROSCOPIC    Collection Time: 08/06/22  5:21 PM   Result Value Ref Range    Color YELLOW/STRAW      Appearance CLEAR CLEAR      Specific gravity 1.010 1.003 - 1.030      pH (UA) 6.0 5.0 - 8.0      Protein Negative NEG mg/dL    Glucose Negative NEG mg/dL    Ketone Negative NEG mg/dL    Bilirubin Negative NEG      Blood Negative NEG      Urobilinogen 0.2 0.2 - 1.0 EU/dL    Nitrites Negative NEG      Leukocyte Esterase SMALL (A) NEG      WBC 0-4 0 - 4 /hpf    RBC 0-5 0 - 5 /hpf    Epithelial cells FEW FEW /lpf    Bacteria Negative NEG /hpf   URINE CULTURE HOLD SAMPLE    Collection Time: 08/06/22  5:21 PM    Specimen: Serum   Result Value Ref Range    Urine culture hold        Urine on hold in Microbiology dept for 2 days.   If unpreserved urine is submitted, it cannot be used for addtional testing after 24 hours, recollection will be required. OCCULT BLOOD, STOOL    Collection Time: 08/06/22  6:33 PM   Result Value Ref Range    Occult blood, stool Positive (A) NEG     RBC, ALLOCATE    Collection Time: 08/06/22  8:15 PM   Result Value Ref Range    HISTORY CHECKED? Historical check performed    METABOLIC PANEL, BASIC    Collection Time: 08/07/22  2:09 AM   Result Value Ref Range    Sodium 129 (L) 136 - 145 mmol/L    Potassium 3.2 (L) 3.5 - 5.1 mmol/L    Chloride 100 97 - 108 mmol/L    CO2 23 21 - 32 mmol/L    Anion gap 6 5 - 15 mmol/L    Glucose 131 (H) 65 - 100 mg/dL    BUN 23 (H) 6 - 20 MG/DL    Creatinine 0.87 0.55 - 1.02 MG/DL    BUN/Creatinine ratio 26 (H) 12 - 20      GFR est AA >60 >60 ml/min/1.73m2    GFR est non-AA >60 >60 ml/min/1.73m2    Calcium 7.8 (L) 8.5 - 10.1 MG/DL   CBC W/O DIFF    Collection Time: 08/07/22  2:09 AM   Result Value Ref Range    WBC 5.6 3.6 - 11.0 K/uL    RBC 2.68 (L) 3.80 - 5.20 M/uL    HGB 8.3 (L) 11.5 - 16.0 g/dL    HCT 24.1 (L) 35.0 - 47.0 %    MCV 89.9 80.0 - 99.0 FL    MCH 31.0 26.0 - 34.0 PG    MCHC 34.4 30.0 - 36.5 g/dL    RDW 14.2 11.5 - 14.5 %    PLATELET 464 (L) 007 - 400 K/uL    MPV 10.8 8.9 - 12.9 FL    NRBC 0.0 0  WBC    ABSOLUTE NRBC 0.00 0.00 - 0.01 K/uL       Physical Exam  Constitutional:       Appearance: Normal appearance. She is ill-appearing. HENT:      Head: Normocephalic. Eyes:      Conjunctiva/sclera: Conjunctivae normal.   Cardiovascular:      Rate and Rhythm: Normal rate. Pulmonary:      Breath sounds: Normal breath sounds. Abdominal:      General: Bowel sounds are normal.      Palpations: Abdomen is soft. Tenderness: There is abdominal tenderness. Skin:     General: Skin is warm and dry. Neurological:      Mental Status: She is alert. IMP Melena and anemia.   Elevated BUN likely points to upper source  Plan PPIs, egd tomorrow, monitor Hgb

## 2022-08-07 NOTE — PROGRESS NOTES
Problem: Falls - Risk of  Goal: *Absence of Falls  Description: Document Jolie Farley Fall Risk and appropriate interventions in the flowsheet.   Outcome: Progressing Towards Goal  Note: Fall Risk Interventions:  Mobility Interventions: Bed/chair exit alarm, Patient to call before getting OOB, Strengthening exercises (ROM-active/passive)         Medication Interventions: Assess postural VS orthostatic hypotension, Bed/chair exit alarm, Evaluate medications/consider consulting pharmacy, Patient to call before getting OOB, Teach patient to arise slowly    Elimination Interventions: Bed/chair exit alarm, Call light in reach, Patient to call for help with toileting needs              Problem: Patient Education: Go to Patient Education Activity  Goal: Patient/Family Education  Outcome: Progressing Towards Goal     Problem: Pain  Goal: *Control of Pain  Outcome: Progressing Towards Goal     Problem: Patient Education: Go to Patient Education Activity  Goal: Patient/Family Education  Outcome: Progressing Towards Goal

## 2022-08-07 NOTE — PROGRESS NOTES
Occupational therapy note:  Orders received, chart reviewed. Spoke with patient RN who reports patient currently with increased pain and RN attempting to call MD for additional medication. RN requesting hold OT for now and follow up at later time. Renetta Pavon MS OTR/L

## 2022-08-07 NOTE — PROGRESS NOTES
0015  Patient anxious and upset that she's having pain and was told in the ED that she would be able to see the MD when she gets to the 5th floor and now she's being told that the MD is going to be contacted for pain meds. Assured patient MD was contacted for pain management and Ambien order she requested and is awaiting reply but she remain anxious and upset saying she's going to convulse if \"you'll don't do something for this pain\" Patient refused v/signs check saying it was done in the ED and was elevated and nothing was done. [id number]  Patient received pain, nausea and sleep medication and stated she is feeling better and apologized for her behavior earlier. Daughter remain with patient.

## 2022-08-07 NOTE — PROGRESS NOTES
Problem: Falls - Risk of  Goal: *Absence of Falls  Description: Document Rito Goel Fall Risk and appropriate interventions in the flowsheet.   8/7/2022 1036 by Blake Funes RN  Outcome: Progressing Towards Goal  Note: Fall Risk Interventions:  Mobility Interventions: Bed/chair exit alarm, Communicate number of staff needed for ambulation/transfer, Patient to call before getting OOB, Utilize gait belt for transfers/ambulation         Medication Interventions: Bed/chair exit alarm, Evaluate medications/consider consulting pharmacy, Patient to call before getting OOB    Elimination Interventions: Bed/chair exit alarm, Call light in reach, Patient to call for help with toileting needs           8/7/2022 1036 by Blake Funes RN  Outcome: Progressing Towards Goal  Note: Fall Risk Interventions:  Mobility Interventions: Bed/chair exit alarm, Communicate number of staff needed for ambulation/transfer, Patient to call before getting OOB, Utilize gait belt for transfers/ambulation         Medication Interventions: Bed/chair exit alarm, Evaluate medications/consider consulting pharmacy, Patient to call before getting OOB    Elimination Interventions: Bed/chair exit alarm, Call light in reach, Patient to call for help with toileting needs              Problem: Pain  Goal: *Control of Pain  8/7/2022 1036 by Blake Funes RN  Outcome: Progressing Towards Goal  8/7/2022 1036 by Blake Funes RN  Outcome: Progressing Towards Goal     Problem: Upper and Lower GI Bleed: Day 1  Goal: Off Pathway (Use only if patient is Off Pathway)  Outcome: Progressing Towards Goal  Goal: Activity/Safety  Outcome: Progressing Towards Goal  Goal: Consults, if ordered  Outcome: Progressing Towards Goal  Goal: Diagnostic Test/Procedures  Outcome: Progressing Towards Goal  Goal: Nutrition/Diet  Outcome: Progressing Towards Goal  Goal: Discharge Planning  Outcome: Progressing Towards Goal  Goal: Medications  Outcome: Progressing Towards Goal  Goal: Respiratory  Outcome: Progressing Towards Goal  Goal: Treatments/Interventions/Procedures  Outcome: Progressing Towards Goal  Goal: Psychosocial  Outcome: Progressing Towards Goal  Goal: *Optimal pain control at patient's stated goal  Outcome: Progressing Towards Goal  Goal: *Hemodynamically stable  Outcome: Progressing Towards Goal  Goal: *Demonstrates progressive activity  Outcome: Progressing Towards Goal

## 2022-08-07 NOTE — PROGRESS NOTES
[facility]                                                                                          Hospitalist Progress Note  Fortino Gowers, MD  Answering service: [phone] from in house phone        Date of Service:  8052  NAME:  Crystal Wilson  :  1940  MRN:  [id number]      Admission Summary:     Crystal Wilson is a 78 y.o. female with history of multiple myeloma, history of right lung cancer status post lobectomy, hypertension, B12 deficiency, neuropathic pain, CKD stage II who presents to hospital with complaints of dizziness, lightheadedness and dyspnea with minimal exertion. Patient also reports dark starry stools over the last month. The patient denies any fever, chills, chest or abdominal pain, nausea, vomiting, cough, congestion, recent illness, palpitations, or dysuria. Interval history / Subjective:     Still complains of abdominal pain, left sided  No new melena     Assessment & Plan:        #GI bleed  Patient with several week history of NSAID usage and melena comes in with mild anemia hemoglobin initially 9.6, dropped to 8.3 overnight. Repeat hemoglobin is pending  -Have discussed with GI service, plan for EGD in a.m.  -Continue to follow serial hemoglobin and hematocrit  -IV fluids  -IV PPI twice daily  -Liquids only until n.p.o. at midnight    #Abdominal pain  Concern for left-sided abdominal pain and tenderness  -CT appears to be consistent with enteritis and possibly acute diverticulitis  -Keep patient on clear liquid diet  -Have started IV antibiotics with Zosyn    #Hypertension  Continue PTA meds including Norvasc, metoprolol  -Added as needed hydralazine    #Hyponatremia  Sodium down to 129, continue IV antibiotics as above    #Hypokalemia  Replace and follow    #Urinary tract infection  Urinalysis shows small amount of leukocyte Estrace.   If this is in fact a infection it should be covered by the Zosyn as above    #CKD 3  IV fluids as above to prevent FRED    #history of lung cancer and multiple myeloma  To be managed by patient's oncology team as an outpatient              Code status: Full  Prophylaxis: SCDs only due to GI bleed as above  Care Plan discussed with: GI, nursing  Anticipated Disposition: 2 to 3 days     Hospital Problems  Date Reviewed: 10/17/2019            Codes Class Noted POA    GI bleed ICD-10-CM: K92.2  ICD-9-CM: 578.9  8/6/2022 Unknown             Review of Systems:   A comprehensive review of systems was negative except for that written in the HPI. Vital Signs:    Last 24hrs VS reviewed since prior progress note. Most recent are:  Visit Vitals  /69 (BP 1 Location: Right upper arm, BP Patient Position: Lying left side; At rest)   Pulse 74   Temp 98.6 °F (37 °C)   Resp 16   Ht 5' 3\" (1.6 m)   Wt 65.8 kg (145 lb)   SpO2 94%   BMI 25.69 kg/m²         Intake/Output Summary (Last 24 hours) at 8/7/2022 1645  Last data filed at 8/7/2022 0800  Gross per 24 hour   Intake 702.5 ml   Output --   Net 702.5 ml        Physical Examination:     I had a face to face encounter with this patient and independently examined them on 8/7/2022 as outlined below:          Constitutional:  No acute distress, cooperative, pleasant    ENT:  Oral mucosa moist, oropharynx benign. Resp:  CTA bilaterally. No wheezing/rhonchi/rales. No accessory muscle use. CV:  Regular rhythm, normal rate, no murmurs, gallops, rubs    GI: Soft, tender midepigastrium and left mid abdomen with positive guarding on the left side. Decreased bowel sounds, no rebound   Musculoskeletal:  No edema, warm, 2+ pulses throughout    Neurologic:  Moves all extremities.   AAOx3, CN II-XII reviewed            Data Review:    Review and/or order of clinical lab test      Labs:     Recent Labs     08/07/22  0209 08/06/22  1532   WBC 5.6 8.5   HGB 8.3* 9.6*   HCT 24.1* 28.1*   * 168     Recent Labs     08/07/22  0209 08/06/22  1532   NA 129* 133*   K 3.2* 3.8    103   CO2 23 22   BUN 23* 38*   CREA 0.87 1.03*   * 138*   CA 7.8* 9.0     Recent Labs     08/06/22  1532   ALT 10*   AP 55   TBILI 0.3   TP 9.9*   ALB 3.2*   GLOB 6.7*     No results for input(s): INR, PTP, APTT, INREXT in the last 72 hours. No results for input(s): FE, TIBC, PSAT, FERR in the last 72 hours. No results found for: FOL, RBCF   No results for input(s): PH, PCO2, PO2 in the last 72 hours. No results for input(s): CPK, CKNDX, TROIQ in the last 72 hours.     No lab exists for component: CPKMB  No results found for: CHOL, CHOLX, CHLST, CHOLV, HDL, HDLP, LDL, LDLC, DLDLP, TGLX, TRIGL, TRIGP, CHHD, CHHDX  Lab Results   Component Value Date/Time    Glucose (POC) 114 (H) 09/14/2015 07:16 AM    Glucose (POC) 88 09/08/2015 08:26 AM     Lab Results   Component Value Date/Time    Color YELLOW/STRAW 08/06/2022 05:21 PM    Appearance CLEAR 08/06/2022 05:21 PM    Specific gravity 1.010 08/06/2022 05:21 PM    pH (UA) 6.0 08/06/2022 05:21 PM    Protein Negative 08/06/2022 05:21 PM    Glucose Negative 08/06/2022 05:21 PM    Ketone Negative 08/06/2022 05:21 PM    Bilirubin Negative 08/06/2022 05:21 PM    Urobilinogen 0.2 08/06/2022 05:21 PM    Nitrites Negative 08/06/2022 05:21 PM    Leukocyte Esterase SMALL (A) 08/06/2022 05:21 PM    Epithelial cells FEW 08/06/2022 05:21 PM    Bacteria Negative 08/06/2022 05:21 PM    WBC 0-4 08/06/2022 05:21 PM    RBC 0-5 08/06/2022 05:21 PM         Medications Reviewed:     Current Facility-Administered Medications   Medication Dose Route Frequency    zolpidem (AMBIEN) tablet 5 mg  5 mg Oral QHS PRN    hydrALAZINE (APRESOLINE) 20 mg/mL injection 10 mg  10 mg IntraVENous Q6H PRN    potassium chloride 10 mEq in 100 ml IVPB  10 mEq IntraVENous Q1H    prochlorperazine (COMPAZINE) injection 10 mg  10 mg IntraVENous Q6H PRN    morphine injection 1 mg  1 mg IntraVENous Q6H PRN    piperacillin-tazobactam (ZOSYN) 2.25 g in 0.9% sodium chloride (MBP/ADV) 50 mL MBP  2.25 g IntraVENous Q8H    0.9% sodium chloride infusion 250 mL  250 mL IntraVENous PRN    sodium chloride (NS) flush 5-40 mL  5-40 mL IntraVENous Q8H    sodium chloride (NS) flush 5-40 mL  5-40 mL IntraVENous PRN    acetaminophen (TYLENOL) tablet 650 mg  650 mg Oral Q6H PRN    Or    acetaminophen (TYLENOL) suppository 650 mg  650 mg Rectal Q6H PRN    polyethylene glycol (MIRALAX) packet 17 g  17 g Oral DAILY PRN    ondansetron (ZOFRAN ODT) tablet 4 mg  4 mg Oral Q8H PRN    Or    ondansetron (ZOFRAN) injection 4 mg  4 mg IntraVENous Q6H PRN    0.9% sodium chloride infusion  75 mL/hr IntraVENous CONTINUOUS    pantoprazole (PROTONIX) 40 mg in 0.9% sodium chloride 10 mL injection  40 mg IntraVENous Q12H    amLODIPine (NORVASC) tablet 10 mg  10 mg Oral DAILY    DULoxetine (CYMBALTA) capsule 30 mg  30 mg Oral DAILY    metoprolol succinate (TOPROL-XL) XL tablet 100 mg  100 mg Oral DAILY     ______________________________________________________________________  EXPECTED LENGTH OF STAY: - - -  ACTUAL LENGTH OF STAY:          1                 Kimberly Brown MD

## 2022-08-07 NOTE — PROGRESS NOTES
Bedside and Verbal shift change report given to [facility] (oncoming nurse) by Lucius Velasco RN (offgoing nurse). Report included the following information SBAR, Kardex, ED Summary, Intake/Output, MAR, and Recent Results.

## 2022-08-08 ENCOUNTER — ANESTHESIA (OUTPATIENT)
Dept: ENDOSCOPY | Age: 82
DRG: 378 | End: 2022-08-08
Payer: MEDICARE

## 2022-08-08 ENCOUNTER — ANESTHESIA EVENT (OUTPATIENT)
Dept: ENDOSCOPY | Age: 82
DRG: 378 | End: 2022-08-08
Payer: MEDICARE

## 2022-08-08 LAB
ANION GAP SERPL CALC-SCNC: 9 MMOL/L (ref 5–15)
ATRIAL RATE: 70 BPM
BASOPHILS # BLD: 0 K/UL (ref 0–0.1)
BASOPHILS NFR BLD: 0 % (ref 0–1)
BUN SERPL-MCNC: 11 MG/DL (ref 6–20)
BUN/CREAT SERPL: 14 (ref 12–20)
CALCIUM SERPL-MCNC: 8.2 MG/DL (ref 8.5–10.1)
CALCULATED P AXIS, ECG09: 72 DEGREES
CALCULATED R AXIS, ECG10: 8 DEGREES
CALCULATED T AXIS, ECG11: 50 DEGREES
CHLORIDE SERPL-SCNC: 96 MMOL/L (ref 97–108)
CO2 SERPL-SCNC: 19 MMOL/L (ref 21–32)
CREAT SERPL-MCNC: 0.81 MG/DL (ref 0.55–1.02)
CRP SERPL HS-MCNC: 0.6 MG/L
DIAGNOSIS, 93000: NORMAL
DIFFERENTIAL METHOD BLD: ABNORMAL
EOSINOPHIL # BLD: 0 K/UL (ref 0–0.4)
EOSINOPHIL NFR BLD: 0 % (ref 0–7)
ERYTHROCYTE [DISTWIDTH] IN BLOOD BY AUTOMATED COUNT: 14 % (ref 11.5–14.5)
GLUCOSE SERPL-MCNC: 122 MG/DL (ref 65–100)
HCT VFR BLD AUTO: 24.1 % (ref 35–47)
HGB BLD-MCNC: 8.3 G/DL (ref 11.5–16)
IMM GRANULOCYTES # BLD AUTO: 0 K/UL (ref 0–0.04)
IMM GRANULOCYTES NFR BLD AUTO: 1 % (ref 0–0.5)
LYMPHOCYTES # BLD: 2 K/UL (ref 0.8–3.5)
LYMPHOCYTES NFR BLD: 34 % (ref 12–49)
MAGNESIUM SERPL-MCNC: 1.4 MG/DL (ref 1.6–2.4)
MCH RBC QN AUTO: 31 PG (ref 26–34)
MCHC RBC AUTO-ENTMCNC: 34.4 G/DL (ref 30–36.5)
MCV RBC AUTO: 89.9 FL (ref 80–99)
MONOCYTES # BLD: 0.2 K/UL (ref 0–1)
MONOCYTES NFR BLD: 4 % (ref 5–13)
NEUTS SEG # BLD: 3.6 K/UL (ref 1.8–8)
NEUTS SEG NFR BLD: 61 % (ref 32–75)
NRBC # BLD: 0 K/UL (ref 0–0.01)
NRBC BLD-RTO: 0 PER 100 WBC
P-R INTERVAL, ECG05: 174 MS
PLATELET # BLD AUTO: 156 K/UL (ref 150–400)
PMV BLD AUTO: 11.8 FL (ref 8.9–12.9)
POTASSIUM SERPL-SCNC: 3.4 MMOL/L (ref 3.5–5.1)
Q-T INTERVAL, ECG07: 398 MS
QRS DURATION, ECG06: 88 MS
QTC CALCULATION (BEZET), ECG08: 429 MS
RBC # BLD AUTO: 2.68 M/UL (ref 3.8–5.2)
SODIUM SERPL-SCNC: 124 MMOL/L (ref 136–145)
VENTRICULAR RATE, ECG03: 70 BPM
WBC # BLD AUTO: 5.9 K/UL (ref 3.6–11)

## 2022-08-08 PROCEDURE — 97165 OT EVAL LOW COMPLEX 30 MIN: CPT

## 2022-08-08 PROCEDURE — 85025 COMPLETE CBC W/AUTO DIFF WBC: CPT

## 2022-08-08 PROCEDURE — 2709999900 HC NON-CHARGEABLE SUPPLY

## 2022-08-08 PROCEDURE — C9113 INJ PANTOPRAZOLE SODIUM, VIA: HCPCS | Performed by: STUDENT IN AN ORGANIZED HEALTH CARE EDUCATION/TRAINING PROGRAM

## 2022-08-08 PROCEDURE — 74011000250 HC RX REV CODE- 250: Performed by: NURSE ANESTHETIST, CERTIFIED REGISTERED

## 2022-08-08 PROCEDURE — 80048 BASIC METABOLIC PNL TOTAL CA: CPT

## 2022-08-08 PROCEDURE — 74011000258 HC RX REV CODE- 258: Performed by: HOSPITALIST

## 2022-08-08 PROCEDURE — 88305 TISSUE EXAM BY PATHOLOGIST: CPT

## 2022-08-08 PROCEDURE — 36415 COLL VENOUS BLD VENIPUNCTURE: CPT

## 2022-08-08 PROCEDURE — 74011250636 HC RX REV CODE- 250/636: Performed by: STUDENT IN AN ORGANIZED HEALTH CARE EDUCATION/TRAINING PROGRAM

## 2022-08-08 PROCEDURE — 97116 GAIT TRAINING THERAPY: CPT

## 2022-08-08 PROCEDURE — 74011250637 HC RX REV CODE- 250/637: Performed by: HOSPITALIST

## 2022-08-08 PROCEDURE — 74011250636 HC RX REV CODE- 250/636: Performed by: HOSPITALIST

## 2022-08-08 PROCEDURE — 65270000029 HC RM PRIVATE

## 2022-08-08 PROCEDURE — 74011250637 HC RX REV CODE- 250/637: Performed by: STUDENT IN AN ORGANIZED HEALTH CARE EDUCATION/TRAINING PROGRAM

## 2022-08-08 PROCEDURE — 0DB68ZX EXCISION OF STOMACH, VIA NATURAL OR ARTIFICIAL OPENING ENDOSCOPIC, DIAGNOSTIC: ICD-10-PCS | Performed by: INTERNAL MEDICINE

## 2022-08-08 PROCEDURE — 77030021593 HC FCPS BIOP ENDOSC BSC -A: Performed by: INTERNAL MEDICINE

## 2022-08-08 PROCEDURE — 86141 C-REACTIVE PROTEIN HS: CPT

## 2022-08-08 PROCEDURE — 76040000019: Performed by: INTERNAL MEDICINE

## 2022-08-08 PROCEDURE — 97535 SELF CARE MNGMENT TRAINING: CPT

## 2022-08-08 PROCEDURE — 2709999900 HC NON-CHARGEABLE SUPPLY: Performed by: INTERNAL MEDICINE

## 2022-08-08 PROCEDURE — 97161 PT EVAL LOW COMPLEX 20 MIN: CPT

## 2022-08-08 PROCEDURE — 74011250636 HC RX REV CODE- 250/636: Performed by: NURSE ANESTHETIST, CERTIFIED REGISTERED

## 2022-08-08 PROCEDURE — 76060000031 HC ANESTHESIA FIRST 0.5 HR: Performed by: INTERNAL MEDICINE

## 2022-08-08 PROCEDURE — 74011000250 HC RX REV CODE- 250: Performed by: STUDENT IN AN ORGANIZED HEALTH CARE EDUCATION/TRAINING PROGRAM

## 2022-08-08 PROCEDURE — 83735 ASSAY OF MAGNESIUM: CPT

## 2022-08-08 RX ORDER — POTASSIUM CHLORIDE 7.45 MG/ML
20 INJECTION INTRAVENOUS ONCE
Status: COMPLETED | OUTPATIENT
Start: 2022-08-08 | End: 2022-08-08

## 2022-08-08 RX ORDER — SODIUM CHLORIDE 9 MG/ML
INJECTION, SOLUTION INTRAVENOUS
Status: DISCONTINUED | OUTPATIENT
Start: 2022-08-08 | End: 2022-08-08 | Stop reason: HOSPADM

## 2022-08-08 RX ORDER — LIDOCAINE HYDROCHLORIDE 20 MG/ML
INJECTION, SOLUTION EPIDURAL; INFILTRATION; INTRACAUDAL; PERINEURAL AS NEEDED
Status: DISCONTINUED | OUTPATIENT
Start: 2022-08-08 | End: 2022-08-08 | Stop reason: HOSPADM

## 2022-08-08 RX ORDER — MIDAZOLAM HYDROCHLORIDE 1 MG/ML
.25-5 INJECTION, SOLUTION INTRAMUSCULAR; INTRAVENOUS
Status: DISCONTINUED | OUTPATIENT
Start: 2022-08-08 | End: 2022-08-08 | Stop reason: HOSPADM

## 2022-08-08 RX ORDER — LORAZEPAM 1 MG/1
1 TABLET ORAL ONCE
Status: COMPLETED | OUTPATIENT
Start: 2022-08-08 | End: 2022-08-08

## 2022-08-08 RX ORDER — PROPOFOL 10 MG/ML
INJECTION, EMULSION INTRAVENOUS
Status: DISCONTINUED | OUTPATIENT
Start: 2022-08-08 | End: 2022-08-08 | Stop reason: HOSPADM

## 2022-08-08 RX ORDER — FLUMAZENIL 0.1 MG/ML
0.2 INJECTION INTRAVENOUS
Status: ACTIVE | OUTPATIENT
Start: 2022-08-08 | End: 2022-08-08

## 2022-08-08 RX ORDER — DEXTROMETHORPHAN/PSEUDOEPHED 2.5-7.5/.8
1.2 DROPS ORAL
Status: DISCONTINUED | OUTPATIENT
Start: 2022-08-08 | End: 2022-08-08 | Stop reason: HOSPADM

## 2022-08-08 RX ORDER — MAGNESIUM SULFATE 1 G/100ML
1 INJECTION INTRAVENOUS
Status: COMPLETED | OUTPATIENT
Start: 2022-08-08 | End: 2022-08-08

## 2022-08-08 RX ORDER — POTASSIUM CHLORIDE 750 MG/1
40 TABLET, FILM COATED, EXTENDED RELEASE ORAL
Status: COMPLETED | OUTPATIENT
Start: 2022-08-08 | End: 2022-08-08

## 2022-08-08 RX ORDER — EPINEPHRINE 0.1 MG/ML
1 INJECTION INTRACARDIAC; INTRAVENOUS
Status: DISCONTINUED | OUTPATIENT
Start: 2022-08-08 | End: 2022-08-08 | Stop reason: HOSPADM

## 2022-08-08 RX ORDER — MAGNESIUM SULFATE HEPTAHYDRATE 40 MG/ML
2 INJECTION, SOLUTION INTRAVENOUS ONCE
Status: COMPLETED | OUTPATIENT
Start: 2022-08-08 | End: 2022-08-08

## 2022-08-08 RX ORDER — NALOXONE HYDROCHLORIDE 0.4 MG/ML
0.4 INJECTION, SOLUTION INTRAMUSCULAR; INTRAVENOUS; SUBCUTANEOUS
Status: ACTIVE | OUTPATIENT
Start: 2022-08-08 | End: 2022-08-08

## 2022-08-08 RX ORDER — PROPOFOL 10 MG/ML
INJECTION, EMULSION INTRAVENOUS AS NEEDED
Status: DISCONTINUED | OUTPATIENT
Start: 2022-08-08 | End: 2022-08-08 | Stop reason: HOSPADM

## 2022-08-08 RX ORDER — SODIUM CHLORIDE 9 MG/ML
50 INJECTION, SOLUTION INTRAVENOUS CONTINUOUS
Status: DISPENSED | OUTPATIENT
Start: 2022-08-08 | End: 2022-08-08

## 2022-08-08 RX ORDER — ATROPINE SULFATE 0.1 MG/ML
0.4 INJECTION INTRAVENOUS
Status: DISCONTINUED | OUTPATIENT
Start: 2022-08-08 | End: 2022-08-08 | Stop reason: HOSPADM

## 2022-08-08 RX ADMIN — SODIUM CHLORIDE: 9 INJECTION, SOLUTION INTRAVENOUS at 14:15

## 2022-08-08 RX ADMIN — SODIUM CHLORIDE, PRESERVATIVE FREE 10 ML: 5 INJECTION INTRAVENOUS at 05:10

## 2022-08-08 RX ADMIN — MORPHINE SULFATE 1 MG: 2 INJECTION, SOLUTION INTRAMUSCULAR; INTRAVENOUS at 20:01

## 2022-08-08 RX ADMIN — SODIUM CHLORIDE 75 ML/HR: 9 INJECTION, SOLUTION INTRAVENOUS at 01:14

## 2022-08-08 RX ADMIN — SODIUM CHLORIDE, PRESERVATIVE FREE 10 ML: 5 INJECTION INTRAVENOUS at 21:26

## 2022-08-08 RX ADMIN — METOPROLOL SUCCINATE 100 MG: 50 TABLET, EXTENDED RELEASE ORAL at 09:42

## 2022-08-08 RX ADMIN — PROCHLORPERAZINE EDISYLATE 10 MG: 5 INJECTION INTRAMUSCULAR; INTRAVENOUS at 03:31

## 2022-08-08 RX ADMIN — ACETAMINOPHEN 650 MG: 325 TABLET ORAL at 21:26

## 2022-08-08 RX ADMIN — PIPERACILLIN AND TAZOBACTAM 3.38 G: 3; .375 INJECTION, POWDER, FOR SOLUTION INTRAVENOUS at 11:08

## 2022-08-08 RX ADMIN — MORPHINE SULFATE 1 MG: 2 INJECTION, SOLUTION INTRAMUSCULAR; INTRAVENOUS at 04:21

## 2022-08-08 RX ADMIN — PROPOFOL 140 MCG/KG/MIN: 10 INJECTION, EMULSION INTRAVENOUS at 14:39

## 2022-08-08 RX ADMIN — HYDRALAZINE HYDROCHLORIDE 10 MG: 20 INJECTION INTRAMUSCULAR; INTRAVENOUS at 03:26

## 2022-08-08 RX ADMIN — PIPERACILLIN AND TAZOBACTAM 3.38 G: 3; .375 INJECTION, POWDER, FOR SOLUTION INTRAVENOUS at 01:11

## 2022-08-08 RX ADMIN — AMLODIPINE BESYLATE 10 MG: 5 TABLET ORAL at 09:41

## 2022-08-08 RX ADMIN — MAGNESIUM SULFATE HEPTAHYDRATE 2 G: 40 INJECTION, SOLUTION INTRAVENOUS at 09:43

## 2022-08-08 RX ADMIN — ZOLPIDEM TARTRATE 5 MG: 5 TABLET ORAL at 21:26

## 2022-08-08 RX ADMIN — LIDOCAINE HYDROCHLORIDE 60 MG: 20 INJECTION, SOLUTION EPIDURAL; INFILTRATION; INTRACAUDAL; PERINEURAL at 14:39

## 2022-08-08 RX ADMIN — SODIUM CHLORIDE 40 MG: 9 INJECTION INTRAMUSCULAR; INTRAVENOUS; SUBCUTANEOUS at 09:43

## 2022-08-08 RX ADMIN — PROPOFOL 50 MG: 10 INJECTION, EMULSION INTRAVENOUS at 14:39

## 2022-08-08 RX ADMIN — LORAZEPAM 1 MG: 1 TABLET ORAL at 03:27

## 2022-08-08 RX ADMIN — PIPERACILLIN AND TAZOBACTAM 3.38 G: 3; .375 INJECTION, POWDER, FOR SOLUTION INTRAVENOUS at 20:01

## 2022-08-08 RX ADMIN — MAGNESIUM SULFATE HEPTAHYDRATE 1 G: 1 INJECTION, SOLUTION INTRAVENOUS at 06:14

## 2022-08-08 RX ADMIN — SODIUM CHLORIDE 40 MG: 9 INJECTION INTRAMUSCULAR; INTRAVENOUS; SUBCUTANEOUS at 20:01

## 2022-08-08 RX ADMIN — MORPHINE SULFATE 1 MG: 2 INJECTION, SOLUTION INTRAMUSCULAR; INTRAVENOUS at 11:08

## 2022-08-08 RX ADMIN — POTASSIUM CHLORIDE 40 MEQ: 750 TABLET, FILM COATED, EXTENDED RELEASE ORAL at 05:05

## 2022-08-08 RX ADMIN — POTASSIUM CHLORIDE 20 MEQ: 7.46 INJECTION, SOLUTION INTRAVENOUS at 10:30

## 2022-08-08 RX ADMIN — MAGNESIUM SULFATE HEPTAHYDRATE 1 G: 1 INJECTION, SOLUTION INTRAVENOUS at 05:06

## 2022-08-08 RX ADMIN — ONDANSETRON 4 MG: 2 INJECTION INTRAMUSCULAR; INTRAVENOUS at 11:09

## 2022-08-08 NOTE — ANESTHESIA PREPROCEDURE EVALUATION
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 2015    Mass on lung. surgically removed.  Hx of mammogram 03/08/2016    normal    Hypertension     Monoclonal gammopathies     Pap smear for cervical cancer screening 04/25/2013    negative    Stroke [facility])     Unsure of date. Years ago.        Anesthesia Evaluation    Physical Exam    Airway  Mallampati: II  TM Distance: 4 - 6 cm  Neck ROM: normal range of motion   Mouth opening: Normal     Cardiovascular    Rhythm: regular           Dental  No notable dental hx       Pulmonary  Breath sounds clear to auscultation               Abdominal  Abdominal exam normal       Other Findings            Anesthetic Plan    ASA: 3  Anesthesia type: MAC          Induction: Intravenous  Anesthetic plan and risks discussed with: Patient

## 2022-08-08 NOTE — PROGRESS NOTES
Rohan Santamaria  [birth year]  [id number]    Situation:  Verbal report received from: [facility] RN  Procedure: Procedure(s):  ESOPHAGOGASTRODUODENOSCOPY (EGD)  ESOPHAGOGASTRODUODENAL (EGD) BIOPSY    Background:    Preoperative diagnosis: melena  Postoperative diagnosis: Gastric ulcer    :  Dr. Joyce Lunsford  Assistant(s): Endoscopy Technician-1: Rosa Franklin  Endoscopy RN-1: Josi Gabriel RN    Specimens:   ID Type Source Tests Collected by Time Destination   1 : gastric ulcer biopsy Preservative Gastric  Alma Ritchie MD 8/8/2022 1445 Pathology     H. Pylori  no    Assessment:  Intra-procedure medications   Anesthesia gave intra-procedure sedation and medications, see anesthesia flow sheet yes    Intravenous fluids: NS@ KVO     Vital signs stable yes    Abdominal assessment: round and soft yes    Recommendation:  Return to floor yes  Family or Friend not at this time- Dr Joyce Lunsford spoke to daughter in law  Permission to share finding with family or friend yes

## 2022-08-08 NOTE — PROGRESS NOTES
Bedside and Verbal shift change report given to Brandi Haynes (oncoming nurse) by [address] [place] [address] Avenue (offgoing nurse). Report included the following information SBAR, Procedure Summary, Intake/Output, MAR, Recent Results, and Cardiac Rhythm Sinus Rhythm .

## 2022-08-08 NOTE — PROGRESS NOTES
Problem: Mobility Impaired (Adult and Pediatric)  Goal: *Acute Goals and Plan of Care (Insert Text)  Outcome: Progressing Towards Goal   PHYSICAL THERAPY EVALUATION/DISCHARGE  Patient: Crystal Wilson (89 y.o. female)  Date: 8/8/2022  Primary Diagnosis: GI bleed [K92.2]  Procedure(s) (LRB):  ESOPHAGOGASTRODUODENOSCOPY (EGD) (N/A)     Precautions:          ASSESSMENT  Based on the objective data described below, the patient presents with admission due to heme positive stool/GI bleed. Hgb currently 8.3.  she lives alone and was independent PTA. Recent loss of spouse. Dtr present in room. Pt VSS in supine and sitting. C/o dizziness with sit to supine yet VSS. Sit to stand with SBA to independence. Gait of 10' into bathroom without need of asst device. Toileted independently with self care. Returned to bed fatigued yet in NAD. Overall pt is independent, yet stated feels tired and weak. She is up ad wily in room per dtr and pt. Further skilled acute physical therapy is not indicated at this time. Please re-consult if need per hospital course. Functional Outcome Measure: The patient scored 95/100 on the barthel outcome measure   Other factors to consider for discharge: per above and below          PLAN :  Recommendation for discharge: (in order for the patient to meet his/her long term goals)  No skilled physical therapy/ follow up rehabilitation needs identified at this time. This discharge recommendation:  Has been made in collaboration with the attending provider and/or case management    IF patient discharges home will need the following DME: none       SUBJECTIVE:   Patient stated I just want to lie down.     OBJECTIVE DATA SUMMARY:   HISTORY:    Past Medical History:   Diagnosis Date    Anemia     Autoimmune disease ([facility] [address]     Cancer ([facility] [address] 2015    Mass on lung.     Hx of mammogram 03/08/2016    normal    Hypertension     Monoclonal gammopathies     Pap smear for cervical cancer screening 04/25/2013    negative    Stroke ([facility] [address]     Unsure of date. Years ago. Past Surgical History:   Procedure Laterality Date    BREAST SURGERY PROCEDURE UNLISTED  1982    LEFT breast lump    HX HEENT      Lens implants    HX HEMORRHOIDECTOMY  1989    HX OTHER SURGICAL Right 09/2015    right upper lobe of lung removed due to cancer    [address]    HX WRIST FRACTURE [address]    \"cyst in wrist\"       Prior level of function: independent  Personal factors and/or comorbidities impacting plan of care: per above and below    Home Situation  Home Environment: Private residence  # Steps to Enter: 3  Rails to Enter: Yes  Hand Rails : Bilateral  One/Two Story Residence: Two story  # of Interior Steps: [id number] [facility] Floor: Right  Living Alone: Yes  Support Systems:  (dtr in room)  Patient Expects to be Discharged to[de-identified] Home with home health  Current DME Used/Available at Home: None  Tub or Shower Type: Shower    EXAMINATION/PRESENTATION/DECISION MAKING:   Critical Behavior:  Neurologic State: Alert  Orientation Level: Oriented X4  Cognition: Follows commands     Hearing: Auditory  Auditory Impairment: Hard of hearing, bilateral  Skin:  IV  Edema: ENL  Range Of Motion:  AROM: Within functional limits                       Strength:    Strength: Generally decreased, functional                    Tone & Sensation:   Tone: Normal              Sensation: Intact               Coordination:  Coordination: Within functional limits  Vision:      Functional Mobility:  Bed Mobility:  Rolling: Independent  Supine to Sit: Modified independent; Additional time  Sit to Supine: Modified independent (verbal cues to align in bed)  Scooting: Independent  Transfers:  Sit to Stand: Modified independent  Stand to Sit: Independent                       Balance:   Sitting: Intact  Standing: Intact  Ambulation/Gait Training:  Distance (ft): 25 Feet (ft)  Assistive Device: Gait belt  Ambulation - Level of Assistance: Stand-by assistance                                                   Functional Measure:  Barthel Index:    Bathin  Bladder: 10  Bowels: 10  Groomin  Dressing: 10  Feeding: 10  Mobility: 10  Stairs: 10  Toilet Use: 10  Transfer (Bed to Chair and Back): 15  Total: 95/100       The Barthel ADL Index: Guidelines  1. The index should be used as a record of what a patient does, not as a record of what a patient could do. 2. The main aim is to establish degree of independence from any help, physical or verbal, however minor and for whatever reason. 3. The need for supervision renders the patient not independent. 4. A patient's performance should be established using the best available evidence. Asking the patient, friends/relatives and nurses are the usual sources, but direct observation and common sense are also important. However direct testing is not needed. 5. Usually the patient's performance over the preceding 24-48 hours is important, but occasionally longer periods will be relevant. 6. Middle categories imply that the patient supplies over 50 per cent of the effort. 7. Use of aids to be independent is allowed. Score Interpretation (from [address] [place] [address])    Independent   60-79 Minimally independent   40-59 Partially dependent   20-39 Very dependent   <20 Totally dependent     -Erick Padilla., Barthel, D.W. (1965). Functional evaluation: the Barthel Index. [address] [facility] ([address] (1997). The Barthel activities of daily living index: self-reporting versus actual performance in the old (> or = 75 years). Journal of [address] [place] [address] 45(7), 14 [facility], JKEL, Sarita Bustillos., Dannie Fiore. (1999). Measuring the change in disability after inpatient rehabilitation; comparison of the responsiveness of the Barthel Index and Functional [place] Measure. Journal of Neurology, Neurosurgery, and Psychiatry, 66(4), 420-391.   Anam Thayer Exel, N.J.A, Deniz Tomas M.A. (2004) Assessment of post-stroke quality of life in cost-effectiveness studies: The usefulness of the Barthel Index and the EuroQoL-5D. Quality of Life Research, 15, 643-16           Physical Therapy Evaluation Charge Determination   History Examination Presentation Decision-Making   MEDIUM  Complexity : 1-2 comorbidities / personal factors will impact the outcome/ POC  LOW Complexity : 1-2 Standardized tests and measures addressing body structure, function, activity limitation and / or participation in recreation  LOW Complexity : Stable, uncomplicated  Other outcome measures barthel  LOW       Based on the above components, the patient evaluation is determined to be of the following complexity level: LOW     Pain Rating:  none    Activity Tolerance:   Fair      After treatment patient left in no apparent distress:   Supine in bed, Call bell within reach, Caregiver / family present, and Side rails x 3    COMMUNICATION/EDUCATION:   The patients plan of care was discussed with: Occupational therapist, Registered nurse, and Case management. Fall prevention education was provided and the patient/caregiver indicated understanding., Patient/family have participated as able in goal setting and plan of care. , and Patient/family agree to work toward stated goals and plan of care.     Thank you for this referral.  Clive Yu, PT   Time Calculation: 20 mins

## 2022-08-08 NOTE — PROGRESS NOTES
Patient is very anxious and her daughter asked if there was anything her mother could have for anxiety. Called on call Dr. Evan Paul and order for Ativan given, see MAR.

## 2022-08-08 NOTE — PROGRESS NOTES
Reason for Admission:  chronic GI bleed, dizzy. Lightheaded. RUR Score:    13% LOW                 Plan for utilizing home health:    patient plans to need Located within [facility] nursing visits, therapy evals pending. Had Located within [facility] with her  who has passed away (they were  in 2015) and knows what is involved, does not remember the agency or have a preference. PCP: First and Last name:  Bettina Singletary MD     Name of Practice:    Are you a current patient: Yes/No: yes   Approximate date of last visit:    Can you participate in a virtual visit with your PCP: yes                    Current Advanced Directive/Advance Care Plan: Full Code      Healthcare Decision Maker: At this time what is listed is her , who has passed away. Son listed as secondary decision maker is Rosalia Jin, she said both Rosalia Jin and Courtney Gonzalez, and that [address] wife is POA. Call placed to Rosalia Jin to obtain Any's number. Will need to get POA and MPOA documentation for chart. Patient identified she is DNR, physician to be made aware in IDT this am, as patient listed as FULL Code at this time. Will need to have this clarified with patient and physician. Transition of Care Plan:     1- CM following medical management for discharge planning. 2- Endoscopy today at 3:30pm  3- CM to identify correct contacts and phone numbers for family. 4- Patient anticipates family to drive her home at discharge  5- therapy evaluations pending. Met with patient who is expressing pain this am, has not yet been able to work with therapy as a result. Scheduled for Endoscopy this afternoon. Patient lives alone, is  from her  that she  in 2015. She has 2 sons, Courtney Gonzalez and Maximo Post. [place] [phone] CM has called and left ). She lives alone and has been independent and driving.   A few steps to enter the home, and she anticipates ability to stay downstairs if needed until able to manage stairs up to her bedroom. Recognizes role of CM and discharge planning process. Care Management Interventions  PCP Verified by CM: Yes (Dr Sunny Barboza)  Mode of Transport at Discharge:  (family)  Transition of Care Consult (CM Consult):  (new admission assessment)  Physical Therapy Consult: Yes  Occupational Therapy Consult: Yes  Speech Therapy Consult:  (has not been able to work with tx. due to pain last two days)  Support Systems: Child(katelyn) (spouse has passed away)  Confirm Follow Up Transport: Family  The Patient and/or Patient Representative was Provided with a Choice of Provider and Agrees with the Discharge Plan?: Yes  Freedom of Choice List was Provided with Basic Dialogue that Supports the Patient's Individualized Plan of Care/Goals, Treatment Preferences and Shares the Quality Data Associated with the Providers?: Yes (patient anticipates need for [facility] nursing visits.    had them, she does not remember the company, does not have a preference)  Discharge Location  Patient Expects to be Discharged to[de-identified] Home with home health

## 2022-08-08 NOTE — ANESTHESIA POSTPROCEDURE EVALUATION
Procedure(s):  ESOPHAGOGASTRODUODENOSCOPY (EGD)  ESOPHAGOGASTRODUODENAL (EGD) BIOPSY. MAC    Anesthesia Post Evaluation        Patient location during evaluation: PACU  Patient participation: complete - patient participated  Level of consciousness: awake  Pain score: 0  Airway patency: patent  Anesthetic complications: no  Cardiovascular status: acceptable  Respiratory status: acceptable  Hydration status: acceptable    Final Post Anesthesia Temperature Assessment:  Normothermia (36.0-37.5 degrees C)      INITIAL Post-op Vital signs:   Vitals Value Taken Time   /58 08/08/22 1526   Temp     Pulse 76 08/08/22 1526   Resp 19 08/08/22 1526   SpO2 100 % 08/08/22 1526   Vitals shown include unvalidated device data.

## 2022-08-08 NOTE — PERIOP NOTES
[facility]  [birth year]  [id number]    Situation:    Scheduled Procedure: Procedure(s):  ESOPHAGOGASTRODUODENOSCOPY (EGD)  Verbal report received from: Jess Bauer RN  Preoperative diagnosis: melena    Background:    Procedure: Procedure(s):  ESOPHAGOGASTRODUODENOSCOPY (EGD)  Physician performing procedure; Dr. Alejandro Juarez PO Intake: 1000 (water with PO meds)    Pregnancy Test:Not applicable If yes, result: none    Is the patient taking Blood Thinners: NO If yes, list:  and last taken   Is the patient diabetic:no       If yes, what was the last BS:    Time taken? Anything given? no           Does the patient have a Pacemaker/Defibrillator in place?: no   Does the patient need antibiotics before/during/after procedure: no   If the patient is having a colon, How much prep was drank? What were the Colon prep results? Does the patient have SCD in place:no   Is patient on CONTACT precautions:no        If yes, what kind of CONTACT precautions:     Assessment:  Are the vital signs stable prior to patient coming to ENDO?  yes  Is the patient alert/oriented and able to sign consent for the procedures:yes    Does the patient have a patient IV in place?  yes     Recommendation:  Family or Friend present no     Permission to share finding with Family or Friend n/a

## 2022-08-08 NOTE — PERIOP NOTES
1330  TRANSFER - IN REPORT:    Verbal report received from Gauri Noriega RN on [employer]  being received from [id number] for ordered procedure      Report consisted of patients Situation, Background, Assessment and   Recommendations(SBAR). Information from the following report(s) SBAR was reviewed with the receiving nurse. Opportunity for questions and clarification was provided. Assessment completed upon patients arrival to unit and care assumed. 1438  Timeout performed. Anesthesia staff at patient's bedside administering anesthesia and monitoring patients vital signs throughout procedure. See anesthesia note. Post procedure, report received from Jovanni MOCTEZUMA. 1450  Endoscope was pre-cleaned at bedside immediately following procedure by endo Kate pope. 1453  Patient tolerated procedure. Abdomen soft and patient arousable and voices no complaints. Patient transported to endoscopy recovery area. Report given to post procedure RNLogan. 1505  TRANSFER - OUT REPORT:    Verbal report given to Brooke Casey RN on [employer]  being transferred to [id number] for routine progression of care       Report consisted of patients Situation, Background, Assessment and   Recommendations(SBAR). Information from the following report(s) SBAR and Procedure Summary was reviewed with the receiving nurse. Lines:   Peripheral IV 08/06/22 Left Antecubital (Active)   Site Assessment Clean, dry, & intact 08/08/22 1418   Phlebitis Assessment 0 08/08/22 1418   Infiltration Assessment 0 08/08/22 1418   Dressing Status Dry; Intact 08/08/22 1418   Dressing Type Tape;Transparent 08/08/22 1418   Hub Color/Line Status Flushed; Infusing 08/08/22 1418   Action Taken Open ports on tubing capped 08/08/22 1418   Alcohol Cap Used Yes 08/08/22 1418        Opportunity for questions and clarification was provided.       Patient transported with:   Pt chart  Dr. Deepti Jones procedure note  Patent IV.

## 2022-08-08 NOTE — PROGRESS NOTES
OCCUPATIONAL THERAPY EVALUATION/DISCHARGE  Patient: Ab Bruno (55 y.o. female)  Date: 8/8/2022  Primary Diagnosis: GI bleed [K92.2]  Procedure(s) (LRB):  ESOPHAGOGASTRODUODENOSCOPY (EGD) (N/A)  ESOPHAGOGASTRODUODENAL (EGD) BIOPSY (N/A) Day of Surgery   Precautions:       ASSESSMENT  Based on the objective data described below, the patient presents with decreased activity tolerance and increased fatigue following admission for positive stool and GI bleed. Pt with hgb of 8.3 and cleared to participate. She reports dizziness with all positional change with stable BP noted and is receptive to cues for pacing activity. She performs serial ADL tasks this session with overall MOD I with increased time. Pt visibly fatigued throughout session but with no LOB or need for physical assistance throughout session. No further skilled OT services indicated at this time. Current Level of Function (ADLs/self-care): MOD I for ADLs    Functional Outcome Measure: The patient scored 95/100 on the Barthel outcome measure which is indicative of minimal functional impairment. Other factors to consider for discharge: independent PLOF; supportive dtr present     PLAN :  Recommend with staff: ADLs as needed; mobility to bathroom for toileting    Recommendation for discharge: (in order for the patient to meet his/her long term goals)  No skilled occupational therapy/ follow up rehabilitation needs identified at this time. This discharge recommendation:  Has been made in collaboration with the attending provider and/or case management    IF patient discharges home will need the following DME: none       SUBJECTIVE:   Patient stated I just feel so tired.     OBJECTIVE DATA SUMMARY:   HISTORY:   Past Medical History:   Diagnosis Date    Anemia     Autoimmune disease ([facility] [address]     MGUS    smoldering myloma    Cancer ([facility] [address] 2015    Mass on lung. surgically removed.     Hx of mammogram 03/08/2016    normal    Hypertension     Monoclonal gammopathies     Pap smear for cervical cancer screening 04/25/2013    negative    Stroke [facility])     Unsure of date. Years ago. Past Surgical History:   Procedure Laterality Date    HX HEENT      Lens implants    HX HEMORRHOIDECTOMY  1989    HX OTHER SURGICAL Right 09/2015    right upper lobe of lung removed due to cancer    HX TONSILLECTOMY  1962    HX TUBAL LIGATION  1969    HX WRIST FRACTURE [address]    \"cyst in wrist\"    [place] BREAST SURGERY PROCEDURE UNLISTED  1982    LEFT breast lump       Prior Level of Function/Environment/Context: independent  Expanded or extensive additional review of patient history:   Home Situation  Home Environment: Private residence  # Steps to Enter: 3  Rails to Enter: Yes  Hand Rails : Bilateral  One/Two Story Residence: Two story  # of Interior Steps: 15  Interior Rails: Right  Living Alone: Yes  Support Systems:  (dtr in room)  Patient Expects to be Discharged to[de-identified] Home with home health  Current DME Used/Available at Home: None  Tub or Shower Type: Shower    Hand dominance: Right    EXAMINATION OF PERFORMANCE DEFICITS:  Cognitive/Behavioral Status:  Neurologic State: Alert  Orientation Level: Oriented X4  Cognition: Follows commands  Perception: Appears intact  Perseveration: No perseveration noted  Safety/Judgement: Awareness of environment; Fall prevention;Good awareness of safety precautions; Home safety; Insight into deficits    Hearing: Auditory  Auditory Impairment: Hard of hearing, bilateral    Range of Motion:  AROM: Within functional limits    Strength:  Strength: Generally decreased, functional    Coordination:  Coordination: Within functional limits  Fine Motor Skills-Upper: Left Intact; Right Intact    Gross Motor Skills-Upper: Left Intact; Right Intact    Tone & Sensation:  Tone: Normal  Sensation: Intact    Balance:  Sitting: Intact  Standing: Intact    Functional Mobility and Transfers for ADLs:  Bed Mobility:  Rolling: Independent  Supine to Sit: Modified independent; Additional time  Sit to Supine: Modified independent (verbal cues to align in bed)  Scooting: Independent    Transfers:  Sit to Stand: Modified independent  Stand to Sit: Independent  Toilet Transfer : Modified independent    ADL Assessment:  Feeding: Independent    Oral Facial Hygiene/Grooming: Modified Independent    Bathing: Modified independent    Type of Bath: Bath Pack    Upper Body Dressing: Independent    Lower Body Dressing: Modified independent    Toileting: Modified independent      ADL Intervention and task modifications:  Grooming  Grooming Assistance: Modified independent  Position Performed: Standing  Washing Hands: Modified independent    Lower Body Dressing Assistance  Socks: Modified independent  Leg Crossed Method Used: Yes  Position Performed: Seated edge of bed  Cues: Don    Toileting  Bladder Hygiene: Modified independent  Clothing Management: Modified independent  Adaptive Equipment: Grab bars    Cognitive Retraining  Safety/Judgement: Awareness of environment; Fall prevention;Good awareness of safety precautions; Home safety; Insight into deficits    Functional Measure:    Barthel Index:  Bathin  Bladder: 10  Bowels: 10  Groomin  Dressing: 10  Feeding: 10  Mobility: 10  Stairs: 10  Toilet Use: 10  Transfer (Bed to Chair and Back): 15  Total: 95/100      The Barthel ADL Index: Guidelines  1. The index should be used as a record of what a patient does, not as a record of what a patient could do. 2. The main aim is to establish degree of independence from any help, physical or verbal, however minor and for whatever reason. 3. The need for supervision renders the patient not independent. 4. A patient's performance should be established using the best available evidence. Asking the patient, friends/relatives and nurses are the usual sources, but direct observation and common sense are also important. However direct testing is not needed.   5. Usually the patient's performance over the preceding 24-48 hours is important, but occasionally longer periods will be relevant. 6. Middle categories imply that the patient supplies over 50 per cent of the effort. 7. Use of aids to be independent is allowed. Score Interpretation (from [address] [facility] [address])    Independent   60-79 Minimally independent   40-59 Partially dependent   20-39 Very dependent   <20 Totally dependent     -Erick Padilla., Barthel, D.W. (1965). Functional evaluation: the Barthel Index. [address] ([address] (1997). The Barthel activities of daily living index: self-reporting versus actual performance in the old (> or = 75 years). Journal of [address] [place] [address] 45(7), 14 [facility], DION, Ru Hilton., Gypsy Mcdaniel (1999). Measuring the change in disability after inpatient rehabilitation; comparison of the responsiveness of the Barthel Index and Functional [place] Measure. Journal of Neurology, Neurosurgery, and Psychiatry, 66(4), 923-228. Zoya Hickey, N.J.ANNABELLA, JOSÉ MIGUEL Max, & Jace Keenan, MImeldaA. (2004) Assessment of post-stroke quality of life in cost-effectiveness studies: The usefulness of the Barthel Index and the EuroQoL-5D.  Quality of Life Research, 15, 412-96     Occupational Therapy Evaluation Charge Determination   History Examination Decision-Making   LOW Complexity : Brief history review  LOW Complexity : 1-3 performance deficits relating to physical, cognitive , or psychosocial skils that result in activity limitations and / or participation restrictions  LOW Complexity : No comorbidities that affect functional and no verbal or physical assistance needed to complete eval tasks       Based on the above components, the patient evaluation is determined to be of the following complexity level: LOW   Pain Rating:  Pt denied pain; c/c of dizziness and fatigue    Activity Tolerance:   Fair    After treatment patient left in no apparent distress: Supine in bed, Call bell within reach, Bed / chair alarm activated, Caregiver / family present, and Side rails x 3    COMMUNICATION/EDUCATION:   The patients plan of care was discussed with: Physical therapist and Registered nurse.      Thank you for this referral.  Evan Boone, OT  Time Calculation: 19 mins

## 2022-08-08 NOTE — PROGRESS NOTES
[address] [facility] Adult  Hospitalist Group                                                                                          Hospitalist Progress Note  Edmond Linares MD  Answering service: [phone] from in house phone        Date of Service:    NAME:  Rupert Alvarado  :  1940  MRN:  [id number]      Admission Summary:     Rupert Alvarado is a 78 y.o. female with history of multiple myeloma, history of right lung cancer status post lobectomy, hypertension, B12 deficiency, neuropathic pain, CKD stage II who presents to hospital with complaints of dizziness, lightheadedness and dyspnea with minimal exertion. Patient also reports dark starry stools over the last month. The patient denies any fever, chills, chest or abdominal pain, nausea, vomiting, cough, congestion, recent illness, palpitations, or dysuria. Interval history / Subjective:     Feels better overall, less abd pain, no melena, no nausea. Just hungry and anxious     Assessment & Plan:        #GI bleed  Patient with several week history of NSAID usage and melena comes in with mild anemia hemoglobin initially 9.6, dropped to 8.3 overnight.   Repeat hemoglobin is pending  -Have discussed with GI service, plan for EGD in a.m.  -Continue to follow serial hemoglobin and hematocrit  -IV fluids  -IV PPI twice daily  -Liquids only until n.p.o. at midnight     - EGD completed which shows three non bleeding ulcers seen in the antrum with few erosions, otherwise mucosa within normal.  -Continue PPI  -Counseled patient on discontinuing all NSAIDs    #Abdominal pain / Diverticulitis  Concern for left-sided abdominal pain and tenderness  -CT appears to be consistent with enteritis and possibly acute diverticulitis  -Keep patient on clear liquid diet  -Have started IV antibiotics with Zosyn    -continue IV Zosyn and clear liquid diet for treatment of acute diverticulitis    #Hypertension  Continue PTA meds including Norvasc, metoprolol  -Added as needed hydralazine    #Hyponatremia  Sodium down to 129, continue IV fluids as above    August 8-Sodium down to 124  -Increase normal saline fluid rate    #Hypokalemia  Replace and follow    August 8-potassium still low at 3.4. Replace with IV potassium and check again in a.m. #Hypomagnesemia  August 8-magnesium low at 1.4, replace and follow    #Urinary tract infection  Urinalysis shows small amount of leukocyte Estrace. If this is in fact a infection it should be covered by the Zosyn as above    #CKD 3  IV fluids as above to prevent FRED    #history of lung cancer and multiple myeloma  To be managed by patient's oncology team as an outpatient              Code status: Full  Prophylaxis: SCDs only due to GI bleed as above  Care Plan discussed with: GI, nursing  Anticipated Disposition: 1 to 2 days  Can discharge when able to tolerate soft diet     Hospital Problems  Date Reviewed: 10/17/2019            Codes Class Noted POA    GI bleed ICD-10-CM: K92.2  ICD-9-CM: 578.9  8/6/2022 Unknown           Review of Systems:   A comprehensive review of systems was negative except for that written in the HPI. Vital Signs:    Last 24hrs VS reviewed since prior progress note. Most recent are:  Visit Vitals  BP (!) 140/58   Pulse 73   Temp 98 °F (36.7 °C)   Resp 16   Ht 5' 3\" (1.6 m)   Wt 65.8 kg (145 lb)   SpO2 100%   BMI 25.69 kg/m²         Intake/Output Summary (Last 24 hours) at 8/8/2022 1609  Last data filed at 8/8/2022 1458  Gross per 24 hour   Intake 2375 ml   Output --   Net 2375 ml          Physical Examination:     I had a face to face encounter with this patient and independently examined them on 8/8/2022 as outlined below:          Constitutional:  No acute distress, cooperative, pleasant    ENT:  Oral mucosa moist, oropharynx benign. Resp:  CTA bilaterally. No wheezing/rhonchi/rales. No accessory muscle use.     CV:  Regular rhythm, normal rate, no murmurs, gallops, rubs    GI: Soft, mildly tender left mid abd, no rebound, no guarding   Musculoskeletal:  No edema, warm, 2+ pulses throughout    Neurologic:  Moves all extremities. AAOx3, CN II-XII reviewed  Psych: Anxious            Data Review:    Review and/or order of clinical lab test      Labs:     Recent Labs     08/08/22  0128 08/07/22  1720   WBC 5.9 6.4   HGB 8.3* 9.1*   HCT 24.1* 25.9*    165       Recent Labs     08/08/22  0128 08/07/22  1720 08/07/22  0209   * 124* 129*   K 3.4* 3.8 3.2*   CL 96* 96* 100   CO2 19* 21 23   BUN 11 13 23*   CREA 0.81 0.85 0.87   * 139* 131*   CA 8.2* 8.3* 7.8*   MG 1.4*  --   --        Recent Labs     08/06/22  1532   ALT 10*   AP 55   TBILI 0.3   TP 9.9*   ALB 3.2*   GLOB 6.7*       No results for input(s): INR, PTP, APTT, INREXT, INREXT in the last 72 hours. No results for input(s): FE, TIBC, PSAT, FERR in the last 72 hours. No results found for: FOL, RBCF   No results for input(s): PH, PCO2, PO2 in the last 72 hours. No results for input(s): CPK, CKNDX, TROIQ in the last 72 hours.     No lab exists for component: CPKMB  No results found for: CHOL, CHOLX, CHLST, CHOLV, HDL, HDLP, LDL, LDLC, DLDLP, TGLX, TRIGL, TRIGP, CHHD, CHHDX  Lab Results   Component Value Date/Time    Glucose (POC) 114 (H) 09/14/2015 07:16 AM    Glucose (POC) 88 09/08/2015 08:26 AM     Lab Results   Component Value Date/Time    Color YELLOW/STRAW 08/06/2022 05:21 PM    Appearance CLEAR 08/06/2022 05:21 PM    Specific gravity 1.010 08/06/2022 05:21 PM    pH (UA) 6.0 08/06/2022 05:21 PM    Protein Negative 08/06/2022 05:21 PM    Glucose Negative 08/06/2022 05:21 PM    Ketone Negative 08/06/2022 05:21 PM    Bilirubin Negative 08/06/2022 05:21 PM    Urobilinogen 0.2 08/06/2022 05:21 PM    Nitrites Negative 08/06/2022 05:21 PM    Leukocyte Esterase SMALL (A) 08/06/2022 05:21 PM    Epithelial cells FEW 08/06/2022 05:21 PM    Bacteria Negative 08/06/2022 05:21 PM    WBC 0-4 08/06/2022 05:21 PM    RBC 0-5 08/06/2022 05:21 PM         Medications Reviewed:     Current Facility-Administered Medications   Medication Dose Route Frequency    0.9% sodium chloride infusion  50 mL/hr IntraVENous CONTINUOUS    midazolam (VERSED) injection 0.25-5 mg  0.25-5 mg IntraVENous Multiple    naloxone (NARCAN) injection 0.4 mg  0.4 mg IntraVENous Multiple    flumazeniL (ROMAZICON) 0.1 mg/mL injection 0.2 mg  0.2 mg IntraVENous Multiple    simethicone (MYLICON) 66HL/2.5EQ oral drops 80 mg  1.2 mL Oral Multiple    atropine injection 0.4 mg  0.4 mg IntraVENous ONCE PRN    EPINEPHrine (ADRENALIN) 0.1 mg/mL syringe 1 mg  1 mg Endoscopically ONCE PRN    piperacillin-tazobactam (ZOSYN) 3.375 g in 0.9% sodium chloride (MBP/ADV) 100 mL MBP  3.375 g IntraVENous Q8H    zolpidem (AMBIEN) tablet 5 mg  5 mg Oral QHS PRN    hydrALAZINE (APRESOLINE) 20 mg/mL injection 10 mg  10 mg IntraVENous Q6H PRN    prochlorperazine (COMPAZINE) injection 10 mg  10 mg IntraVENous Q6H PRN    morphine injection 1 mg  1 mg IntraVENous Q6H PRN    0.9% sodium chloride infusion 250 mL  250 mL IntraVENous PRN    sodium chloride (NS) flush 5-40 mL  5-40 mL IntraVENous Q8H    sodium chloride (NS) flush 5-40 mL  5-40 mL IntraVENous PRN    acetaminophen (TYLENOL) tablet 650 mg  650 mg Oral Q6H PRN    Or    acetaminophen (TYLENOL) suppository 650 mg  650 mg Rectal Q6H PRN    polyethylene glycol (MIRALAX) packet 17 g  17 g Oral DAILY PRN    ondansetron (ZOFRAN ODT) tablet 4 mg  4 mg Oral Q8H PRN    Or    ondansetron (ZOFRAN) injection 4 mg  4 mg IntraVENous Q6H PRN    0.9% sodium chloride infusion  75 mL/hr IntraVENous CONTINUOUS    pantoprazole (PROTONIX) 40 mg in 0.9% sodium chloride 10 mL injection  40 mg IntraVENous Q12H    amLODIPine (NORVASC) tablet 10 mg  10 mg Oral DAILY    DULoxetine (CYMBALTA) capsule 30 mg  30 mg Oral DAILY    metoprolol succinate (TOPROL-XL) XL tablet 100 mg  100 mg Oral DAILY ______________________________________________________________________  EXPECTED LENGTH OF STAY: - - -  ACTUAL LENGTH OF STAY:          2                 Maritza Urena MD

## 2022-08-08 NOTE — PROGRESS NOTES
Problem: Falls - Risk of  Goal: *Absence of Falls  Description: Document Dalton Flow Fall Risk and appropriate interventions in the flowsheet.   Outcome: Progressing Towards Goal  Note: Fall Risk Interventions:  Mobility Interventions: Bed/chair exit alarm, Communicate number of staff needed for ambulation/transfer, Patient to call before getting OOB, PT Consult for mobility concerns, Utilize walker, cane, or other assistive device, Utilize gait belt for transfers/ambulation         Medication Interventions: Bed/chair exit alarm, Patient to call before getting OOB, Teach patient to arise slowly, Utilize gait belt for transfers/ambulation    Elimination Interventions: Bed/chair exit alarm, Call light in reach, Patient to call for help with toileting needs, Stay With Me (per policy), Toilet paper/wipes in reach, Toileting schedule/hourly rounds              Problem: Patient Education: Go to Patient Education Activity  Goal: Patient/Family Education  Outcome: Progressing Towards Goal     Problem: Pain  Goal: *Control of Pain  Outcome: Progressing Towards Goal     Problem: Patient Education: Go to Patient Education Activity  Goal: Patient/Family Education  Outcome: Progressing Towards Goal     Problem: Patient Education: Go to Patient Education Activity  Goal: Patient/Family Education  Outcome: Progressing Towards Goal     Problem: Upper and Lower GI Bleed: Day 3  Goal: Off Pathway (Use only if patient is Off Pathway)  Outcome: Progressing Towards Goal     Problem: Upper and Lower GI Bleed: Day 3  Goal: Activity/Safety  Outcome: Progressing Towards Goal     Problem: Upper and Lower GI Bleed: Day 3  Goal: Diagnostic Test/Procedures  Outcome: Progressing Towards Goal     Problem: Upper and Lower GI Bleed: Day 3  Goal: Nutrition/Diet  Outcome: Progressing Towards Goal     Problem: Upper and Lower GI Bleed: Day 3  Goal: Discharge Planning  Outcome: Progressing Towards Goal     Problem: Upper and Lower GI Bleed: Day 3  Goal: Medications  Outcome: Progressing Towards Goal     Problem: Upper and Lower GI Bleed: Day 3  Goal: Treatments/Interventions/Procedures  Outcome: Progressing Towards Goal     Problem: Upper and Lower GI Bleed: Day 3  Goal: Psychosocial  Outcome: Progressing Towards Goal     Problem: Upper and Lower GI Bleed:  Discharge Outcomes  Goal: *Hemodynamically stable  Outcome: Progressing Towards Goal

## 2022-08-08 NOTE — PROGRESS NOTES
Bedside and Verbal shift change report given to [address] [place] [address] (oncoming nurse) by Gila Cole (offgoing nurse). Report included the following information SBAR, Procedure Summary, Intake/Output, MAR, Recent Results, and Cardiac Rhythm NSR .

## 2022-08-08 NOTE — PROCEDURES
Levi Rey M.D.  [phone]           2022                Enteroscopy Operative Report  Rupert Alvarado  :  1940  [address] [place] [address] Medical Record Number:  [id number]      Indication:  Abdominal pain, epigastric, Abdominal pain, LUQ, Melena/hematochezia, abnormal CT scan      : Enma Montenegro MD    Referring Provider:  Stephanie Stanford MD      Anesthesia/Sedation:  MAC anesthesia    Airway assessment: No airway problems anticipated    Pre-Procedural Exam:      Airway: clear, no airway problems anticipated  Heart: RRR, without gallops or rubs  Lungs: clear bilaterally without wheezes, crackles, or rhonchi  Abdomen: soft, nontender, nondistended, bowel sounds present  Mental Status: awake, alert and oriented to person, place and time       Procedure Details     After infomed consent was obtained for the procedure, with all risks and benefits of procedure explained the patient was taken to the endoscopy suite and placed in the left lateral decubitus position. Following sequential administration of sedation as per above, the endoscope was inserted into the mouth and advanced under direct vision to second portion of the duodenum. A careful inspection was made as the gastroscope was withdrawn, including a retroflexed view of the proximal stomach; findings and interventions are described below. Findings:   Esophagus:normal  Stomach: Evidence of three ulcers seen in the antrum with superficial whitish craters seen, largest ulcer had a crater about 10 mm in size, and two small superficial erosions seen. Otherwise mucosa within normal.  Duodenum/jejunum:  The slim pediatric colonoscope was used and advanced well into the jejunum pass the ligament of Treitz. No lesions or bleeding seen. Visualized mucosa appeared within normal.    Therapies:  none    Specimens: Gastric ulcers biopsies           Complications:   None; patient tolerated the procedure well. EBL:  None. Impression:     Three non bleeding ulcers seen in the antrum with few erosions, otherwise mucosa within normal.    Recommendations:    -Continue acid suppression.  -Await pathology.  -Avoid any NSAIDs    Osei Patton MD

## 2022-08-09 VITALS
HEART RATE: 81 BPM | BODY MASS INDEX: 25.69 KG/M2 | RESPIRATION RATE: 18 BRPM | SYSTOLIC BLOOD PRESSURE: 151 MMHG | TEMPERATURE: 98.7 F | DIASTOLIC BLOOD PRESSURE: 68 MMHG | HEIGHT: 63 IN | WEIGHT: 145 LBS | OXYGEN SATURATION: 97 %

## 2022-08-09 PROBLEM — K27.9 PUD (PEPTIC ULCER DISEASE): Status: ACTIVE | Noted: 2022-08-09

## 2022-08-09 PROBLEM — K52.9 ENTERITIS: Status: ACTIVE | Noted: 2022-08-09

## 2022-08-09 PROBLEM — Z86.73 HX OF COMPLETED STROKE: Status: ACTIVE | Noted: 2022-08-09

## 2022-08-09 PROBLEM — E87.6 HYPOKALEMIA: Status: ACTIVE | Noted: 2022-08-09

## 2022-08-09 PROBLEM — E83.42 HYPOMAGNESEMIA: Status: ACTIVE | Noted: 2022-08-09

## 2022-08-09 PROBLEM — N17.9 AKI (ACUTE KIDNEY INJURY) (HCC): Status: ACTIVE | Noted: 2022-08-09

## 2022-08-09 PROBLEM — D47.2 MONOCLONAL GAMMOPATHIES: Status: ACTIVE | Noted: 2022-08-09

## 2022-08-09 PROBLEM — E86.0 DEHYDRATION: Status: ACTIVE | Noted: 2022-08-09

## 2022-08-09 PROBLEM — R93.5 ABNORMAL CT OF THE ABDOMEN: Status: ACTIVE | Noted: 2022-08-09

## 2022-08-09 PROBLEM — R10.9 ABDOMINAL PAIN: Status: ACTIVE | Noted: 2022-08-09

## 2022-08-09 PROBLEM — E87.1 HYPONATREMIA: Status: ACTIVE | Noted: 2022-08-09

## 2022-08-09 LAB — CRP SERPL HS-MCNC: 0.8 MG/L

## 2022-08-09 PROCEDURE — 74011250637 HC RX REV CODE- 250/637: Performed by: STUDENT IN AN ORGANIZED HEALTH CARE EDUCATION/TRAINING PROGRAM

## 2022-08-09 PROCEDURE — 74011000250 HC RX REV CODE- 250: Performed by: STUDENT IN AN ORGANIZED HEALTH CARE EDUCATION/TRAINING PROGRAM

## 2022-08-09 PROCEDURE — C9113 INJ PANTOPRAZOLE SODIUM, VIA: HCPCS | Performed by: STUDENT IN AN ORGANIZED HEALTH CARE EDUCATION/TRAINING PROGRAM

## 2022-08-09 PROCEDURE — 74011250636 HC RX REV CODE- 250/636: Performed by: HOSPITALIST

## 2022-08-09 PROCEDURE — 74011250636 HC RX REV CODE- 250/636: Performed by: STUDENT IN AN ORGANIZED HEALTH CARE EDUCATION/TRAINING PROGRAM

## 2022-08-09 PROCEDURE — 86141 C-REACTIVE PROTEIN HS: CPT

## 2022-08-09 PROCEDURE — 74011250636 HC RX REV CODE- 250/636: Performed by: INTERNAL MEDICINE

## 2022-08-09 PROCEDURE — 74011000258 HC RX REV CODE- 258: Performed by: HOSPITALIST

## 2022-08-09 PROCEDURE — 36415 COLL VENOUS BLD VENIPUNCTURE: CPT

## 2022-08-09 RX ORDER — MAGNESIUM SULFATE 1 G/100ML
1 INJECTION INTRAVENOUS
Status: DISPENSED | OUTPATIENT
Start: 2022-08-09 | End: 2022-08-09

## 2022-08-09 RX ORDER — POTASSIUM CHLORIDE AND SODIUM CHLORIDE 900; 300 MG/100ML; MG/100ML
INJECTION, SOLUTION INTRAVENOUS CONTINUOUS
Status: DISCONTINUED | OUTPATIENT
Start: 2022-08-09 | End: 2022-08-09 | Stop reason: HOSPADM

## 2022-08-09 RX ADMIN — PIPERACILLIN AND TAZOBACTAM 3.38 G: 3; .375 INJECTION, POWDER, FOR SOLUTION INTRAVENOUS at 03:41

## 2022-08-09 RX ADMIN — MORPHINE SULFATE 1 MG: 2 INJECTION, SOLUTION INTRAMUSCULAR; INTRAVENOUS at 01:36

## 2022-08-09 RX ADMIN — METOPROLOL SUCCINATE 100 MG: 50 TABLET, EXTENDED RELEASE ORAL at 09:07

## 2022-08-09 RX ADMIN — MAGNESIUM SULFATE HEPTAHYDRATE 1 G: 1 INJECTION, SOLUTION INTRAVENOUS at 09:06

## 2022-08-09 RX ADMIN — SODIUM CHLORIDE 40 MG: 9 INJECTION INTRAMUSCULAR; INTRAVENOUS; SUBCUTANEOUS at 09:07

## 2022-08-09 RX ADMIN — POTASSIUM CHLORIDE AND SODIUM CHLORIDE: 900; 300 INJECTION, SOLUTION INTRAVENOUS at 09:22

## 2022-08-09 RX ADMIN — AMLODIPINE BESYLATE 10 MG: 5 TABLET ORAL at 09:07

## 2022-08-09 RX ADMIN — SODIUM CHLORIDE 75 ML/HR: 9 INJECTION, SOLUTION INTRAVENOUS at 01:41

## 2022-08-09 NOTE — PROGRESS NOTES
Dr Dallas Reynolds was notified that patient wanted to be DC home. Per MD pt can be discharge and order was received. PT's IV was dc and discharge instructions given. Pt and her son verbalized understanding. Pt stated that she feels much better and voice no complaints. Pt has been ambulating independently in the room and she is tolerating her food.

## 2022-08-09 NOTE — PROGRESS NOTES
2022   CARE MANAGEMENT NOTE:  CM reviewed EMR and handoff received from previous  (Kofi Eid.). Pt was admitted with GIB. Reportedly, pt resides alone. Pt's  is . Son, Ann Chaudhari [phone]) is the primary family contact. RUR 14%    Transition Plan of Care:  1.  GI is following for medical management  2. PT/OT evals complete; pt ambulated 25 feet on  and no further rehab services were recommended  3. Plan is for pt to return home  4. Outpatient follow up  5. Pt will arrange her own transportation home    CM will continue to follow pt until discharged.   Max

## 2022-08-09 NOTE — DISCHARGE SUMMARY
Physician Discharge Summary     Patient ID:  Nathen Davis  [id number]  81 y.o.  1940    Admit date: 8/6/2022    Discharge date of service and time: 8/9/2022  Greater than 30 minutes were spent providing discharge related services for this patient    Admission Diagnoses: GI bleed [K92.2]    Discharge Diagnoses:    Principal Diagnosis     Anemia                                              Hospital Course and other diagnoses  Anemia - POA, due to both chronic diease and GI bleed. Checking serologies. Takes B12 at home     PUD (peptic ulcer disease) / GI bleed - GI consulted. EGD found recently bleeding PUD. Now on PPI. Stopped NSAIDS. Poor appetite, ADAT     FRED (acute kidney injury) / Dehydration / Hyponatremia / Hypokalemia / Hypomagnesemia - POA due to poor PO intake. Hydrated and improved. Replete K and MG. SSRI may contribute to hypoNa. Abnormal CT of the abdomen / Enteritis / Abdominal pain - POA, unclear etiology. No stranding or abscess on CT. Low CRP. NEVER had any sepsis criteria. Cx not drawn. Stool not checked. She got 3 days of IV zosyn. Stop that. Unlikely a bacterial infection. Hypertension - Stable on amlodipine and metoprolol     Anxiety and depression / Insomnia - continue duloxetine (though it may contribute to SSRI) and ambien     Lung cancer, lower lobe - Outpatient follow up     Hx of completed stroke -Years ago and without residual.  Outpatient follow up     Monoclonal gammopathies - Outpatient follow up     PCP: Sylvia Steve MD    Consults: GI    Significant Diagnostic Studies: See Hospital Course    Discharged home in improved condition.     Discharge Exam:  BP (!) 156/72 (BP 1 Location: Right upper arm, BP Patient Position: Sitting)   Pulse 72   Temp 97.8 °F (36.6 °C)   Resp 18   Ht 5' 3\" (1.6 m)   Wt 65.8 kg (145 lb)   SpO2 97%   BMI 25.69 kg/m²      Gen:  Well-developed, well-nourished, in no acute distress  HEENT:  Pink conjunctivae, PERRL, hearing intact to voice, moist mucous membranes  Neck:  Supple, without masses, thyroid non-tender  Resp:  No accessory muscle use, clear breath sounds without wheezes rales or rhonchi  Card:  No murmurs, normal S1, S2 without thrills, bruits or peripheral edema  Abd:  Soft, non-tender, non-distended, normoactive bowel sounds are present, no mass  Lymph:  No cervical or inguinal adenopathy  Musc:  No cyanosis or clubbing  Skin:  No rashes or ulcers, skin turgor is good  Neuro:  Cranial nerves are grossly intact, no focal motor weakness, follows commands appropriately  Psych:  Good insight, oriented to person, place and time, alert    Patient Instructions:   Cannot display discharge medications since this patient is not currently admitted. Activity: Activity as tolerated  Diet: Regular Diet, Increase noncaffeinated fluids, and Encourage fluids  Wound Care: None needed    Follow-up with your PCP in a few weeks.   Follow-up tests/labs - none    Signed:  Farzaneh Post MD  8/9/2022  6:14 PM

## 2022-08-09 NOTE — DISCHARGE INSTRUCTIONS
Patient Discharge Instructions    Mercy Cardenas / [id number] : 1940    Admitted 2022 Discharged: 2022     Primary Diagnoses  @Rprob@    Take Home Medications     It is important that you take the medication exactly as they are prescribed. Keep your medication in the bottles provided by the pharmacist and keep a list of the medication names, dosages, and times to be taken in your wallet. Do not take other medications without consulting your doctor. What to do at Home    Recommended diet: Regular Diet, Increase noncaffeinated fluids, and Encourage fluids    Recommended activity: Activity as tolerated    If you experience worse symptoms, please follow up with your PCP. Follow-up with your PCP in a few weeks    [unfilled]     Information obtained by :  I understand that if any problems occur once I am at home I am to contact my physician. I understand and acknowledge receipt of the instructions indicated above.                                                                                                                                            Physician's or R.N.'s Signature                                                                  Date/Time                                                                                                                                              Patient or Representative Signature                                                          Date/Time

## 2022-08-09 NOTE — PROGRESS NOTES
Bedside and Verbal shift change report given to Taiwo Contreras RN (oncoming nurse) by Keara Clarke RN (offgoing nurse). Report included the following information SBAR, Kardex, and ED Summary.

## 2022-08-09 NOTE — PROGRESS NOTES
[address] NP  [phone]           GI PROGRESS NOTE        NAME: Garret Arellano   :  1940   MRN:  [id number]       Subjective:   No complaints this afternoon. Tolerated GI lite diet. Objective:   NAD, Drowsy. VITALS:   Last 24hrs VS reviewed since prior progress note. Most recent are:  Visit Vitals  BP (!) 156/72 (BP 1 Location: Right upper arm, BP Patient Position: Sitting)   Pulse 72   Temp 97.8 °F (36.6 °C)   Resp 18   Ht 5' 3\" (1.6 m)   Wt 65.8 kg (145 lb)   SpO2 97%   BMI 25.69 kg/m²       Intake/Output Summary (Last 24 hours) at 2022 1506  Last data filed at 2022 0908  Gross per 24 hour   Intake 840 ml   Output --   Net 840 ml       PHYSICAL EXAM:  General: Alert, in no acute distress    HEENT: Anicteric sclerae. Lungs:            CTA Bilaterally. Heart:  Regular  rhythm,    Abdomen: Soft, Non distended, Non tender.  (+)Bowel sounds, no HSM  Extremities: No c/c/e  Neurologic:  CN 2-12 gi, Alert and oriented X 3. No acute neurological distress   Psych:   Good insight. Not anxious nor agitated.     Lab Data Reviewed:   Recent Labs     22  0128 22  1720   WBC 5.9 6.4   HGB 8.3* 9.1*   HCT 24.1* 25.9*    165     Recent Labs     22  0128 22  1720   * 124*   K 3.4* 3.8   CL 96* 96*   CO2 19* 21   BUN 11 13   CREA 0.81 0.85   * 139*   CA 8.2* 8.3*     Recent Labs     22  1532   AP 55   TP 9.9*   ALB 3.2*   GLOB 6.7*       ________________________________________________________________________  Patient Active Problem List   Diagnosis Code    Anemia D64.9    Hypertension I10    Lung cancer, lower lobe (HCC) C34.30    GI bleed K92.2    PUD (peptic ulcer disease) K27.9    Hx of completed stroke Z86.73    Monoclonal gammopathies D47.2    FRED (acute kidney injury) ([facility] [address] N17.9    Dehydration E86.0    Hyponatremia E87.1    Hypokalemia E87.6    Hypomagnesemia E83.42    Abnormal CT of the abdomen R93.5 Enteritis K52.9    Abdominal pain R10.9         Assessment and Plan:  Anemia with Melena:  Status post EGD yesterday Three non bleeding ulcers seen in the antrum with few erosions, otherwise mucosa within normal.    - GI Lite diet  - Continue BID PPI  - Continue to monitor hemoglobin  - No NSAIDs    Discussed pt with Dr. Eleonora Ontiveros. Ok to discharge from GI standpoint. Please have her follow up as outpatient.       Signed By: Elena Valladares NP     8/9/2022  3:06 PM

## 2022-08-09 NOTE — PROGRESS NOTES
Nutrition Note    Ordered ONS 2/2 patient's poor appetite reported in IDRs.     Provide Ensure High Protein BID (320 kcal, 38 g carbs, 32 g protein)      Electronically signed by July Fuentes RD on 5/9/0605   Contact: Ext: [phone], or via [employer]

## 2022-08-09 NOTE — ROUTINE PROCESS
Bedside shift change report given to [facility] (oncoming nurse) by Shiva Bloom (offgoing nurse). Report included the following information SBAR, Kardex, Intake/Output, MAR, Accordion, and Recent Results.

## 2022-08-09 NOTE — PROGRESS NOTES
[address]  [phone]    Medical Progress Note      NAME: Escobar Dougherty   :  1940  MRM:  [id number]    Date/Time of service 2022  1:32 PM         Assessment and Plan:     Anemia - POA, due to both chronic diease and GI bleed. Checking serologies. PUD (peptic ulcer disease) / GI bleed - GI consulted. EGD found recently bleeding PUD. Now on PPI. Stopped NSAIDS. Poor appetite, ADAT    FRED (acute kidney injury) / Dehydration / Hyponatremia / Hypokalemia / Hypomagnesemia - POA due to poor PO intake. Hydrated and improved. Replete K and MG. SSRI may contribute to hypoNa. Abnormal CT of the abdomen / Enteritis / Abdominal pain - POA, unclear etiology. No stranding or abscess on CT. Low CRP. NEVER had any sepsis criteria. Cx not drawn. Stool not checked. She got 3 days of IV zosyn. Stop that. Unlikely a bacterial infection. Hypertension - Stable on amlodipine and metoprolol    Anxiety and depression / Insomnia - continue duloxetine (though it may contribute to SSRI) and ambien    Lung cancer, lower lobe - Outpatient follow up    Hx of completed stroke -Years ago and without residual.  Outpatient follow up    Monoclonal gammopathies - Outpatient follow up         Subjective:     Chief Complaint:  better, but still poor PO intake. ROS:  (bold if positive, if negative)    Tolerating PT  Tolerating some Diet        Objective:     Last 24hrs VS reviewed since prior progress note.  Most recent are:    Visit Vitals  BP (!) 156/72 (BP 1 Location: Right upper arm, BP Patient Position: Sitting)   Pulse 72   Temp 97.8 °F (36.6 °C)   Resp 18   Ht 5' 3\" (1.6 m)   Wt 65.8 kg (145 lb)   SpO2 97%   BMI 25.69 kg/m²     SpO2 Readings from Last 6 Encounters:   22 97%   20 98%   20 98%   10/17/19 98%   19 97%   19 100%          Intake/Output Summary (Last 24 hours) at 2022 1334  Last data filed at 8/9/2022 0908  Gross per 24 hour   Intake 1090 ml   Output --   Net 1090 ml        Physical Exam:    Gen:  Well-developed, well-nourished, in no acute distress  HEENT:  Pink conjunctivae, PERRL, hearing intact to voice, moist mucous membranes  Neck:  Supple, without masses, thyroid non-tender  Resp:  No accessory muscle use, clear breath sounds without wheezes rales or rhonchi  Card:  No murmurs, normal S1, S2 without thrills, bruits or peripheral edema  Abd:  Soft, non-tender, non-distended, normoactive bowel sounds are present, no mass  Lymph:  No cervical or inguinal adenopathy  Musc:  No cyanosis or clubbing  Skin:  No rashes or ulcers, skin turgor is good  Neuro:  Cranial nerves are grossly intact, no focal motor weakness, follows commands appropriately  Psych:  Good insight, oriented to person, place and time, alert    Telemetry reviewed:   normal sinus rhythm  __________________________________________________________________  Medications Reviewed: (see below)  Medications:     Current Facility-Administered Medications   Medication Dose Route Frequency    0.9% sodium chloride with KCl 40 mEq/L infusion   IntraVENous CONTINUOUS    zolpidem (AMBIEN) tablet 5 mg  5 mg Oral QHS PRN    hydrALAZINE (APRESOLINE) 20 mg/mL injection 10 mg  10 mg IntraVENous Q6H PRN    prochlorperazine (COMPAZINE) injection 10 mg  10 mg IntraVENous Q6H PRN    morphine injection 1 mg  1 mg IntraVENous Q6H PRN    0.9% sodium chloride infusion 250 mL  250 mL IntraVENous PRN    sodium chloride (NS) flush 5-40 mL  5-40 mL IntraVENous Q8H    sodium chloride (NS) flush 5-40 mL  5-40 mL IntraVENous PRN    acetaminophen (TYLENOL) tablet 650 mg  650 mg Oral Q6H PRN    Or    acetaminophen (TYLENOL) suppository 650 mg  650 mg Rectal Q6H PRN    polyethylene glycol (MIRALAX) packet 17 g  17 g Oral DAILY PRN    ondansetron (ZOFRAN ODT) tablet 4 mg  4 mg Oral Q8H PRN    Or    ondansetron (ZOFRAN) injection 4 mg  4 mg IntraVENous Q6H PRN pantoprazole (PROTONIX) 40 mg in 0.9% sodium chloride 10 mL injection  40 mg IntraVENous Q12H    amLODIPine (NORVASC) tablet 10 mg  10 mg Oral DAILY    DULoxetine (CYMBALTA) capsule 30 mg  30 mg Oral DAILY    metoprolol succinate (TOPROL-XL) XL tablet 100 mg  100 mg Oral DAILY        Lab Data Reviewed: (see below)  Lab Review:     Recent Labs     08/08/22 0128 08/07/22  1720 08/07/22  0209   WBC 5.9 6.4 5.6   HGB 8.3* 9.1* 8.3*   HCT 24.1* 25.9* 24.1*    165 138*     Recent Labs     08/08/22 0128 08/07/22  1720 08/07/22  0209 08/06/22  1532   * 124* 129* 133*   K 3.4* 3.8 3.2* 3.8   CL 96* 96* 100 103   CO2 19* 21 23 22   * 139* 131* 138*   BUN 11 13 23* 38*   CREA 0.81 0.85 0.87 1.03*   CA 8.2* 8.3* 7.8* 9.0   MG 1.4*  --   --   --    ALB  --   --   --  3.2*   TBILI  --   --   --  0.3   ALT  --   --   --  10*     Lab Results   Component Value Date/Time    Glucose (POC) 114 (H) 09/14/2015 07:16 AM    Glucose (POC) 88 09/08/2015 08:26 AM     No results for input(s): PH, PCO2, PO2, HCO3, FIO2 in the last 72 hours. No results for input(s): INR, INREXT, INREXT in the last 72 hours. All Micro Results       Procedure Component Value Units Date/Time    URINE CULTURE HOLD SAMPLE [[id number]] Collected: 08/06/22 1721    Order Status: Completed Specimen: Serum Updated: 08/06/22 1725     Urine culture hold       Urine on hold in Microbiology dept for 2 days. If unpreserved urine is submitted, it cannot be used for addtional testing after 24 hours, recollection will be required. Other pertinent lab: none    Total time spent with patient: 30 Minutes I personally reviewed chart, notes, data and current medications in the medical record. I have personally examined and treated the patient at bedside during this period. To assist coordination of care and communication with nursing and staff, this note may be preliminary early in the day, but finalized by end of the day.                  Care Plan discussed with: Patient, Family, Care Manager, Nursing Staff, Consultant/Specialist, and >50% of time spent in counseling and coordination of care    Discussed:  Care Plan and D/C Planning    Prophylaxis:  Lovenox and H2B/PPI    Disposition:  Home w/Family and HH PT, OT, RN           ___________________________________________________    Attending Physician: Robin Guzmán MD

## 2022-08-10 LAB
ABO + RH BLD: NORMAL
ANTI-COMPLEMENT (C3B,C3D): NORMAL
BLD GP AB SCN SERPL QL ELUTION: NORMAL
BLD PROD TYP BPU: NORMAL
BLD PROD TYP BPU: NORMAL
BLOOD GROUP ANTIBODIES SERPL: NORMAL
BLOOD GROUP ANTIBODIES SERPL: NORMAL
BPU ID: NORMAL
BPU ID: NORMAL
CROSSMATCH RESULT,%XM: NORMAL
CROSSMATCH RESULT,%XM: NORMAL
DAT IGG-SP REAG RBC QL: NORMAL
DAT POLY-SP REAG RBC QL: NORMAL
SPECIMEN EXP DATE BLD: NORMAL
STATUS OF UNIT,%ST: NORMAL
STATUS OF UNIT,%ST: NORMAL
UNIT DIVISION, %UDIV: 0
UNIT DIVISION, %UDIV: 0

## 2022-08-18 ENCOUNTER — TELEPHONE (OUTPATIENT)
Dept: ONCOLOGY | Age: 82
End: 2022-08-18

## 2022-08-18 NOTE — TELEPHONE ENCOUNTER
[facility]  [phone]    08/18/22 3:58 PM - Called and spoke with the patient's son, Fei Emmanuel. He states they'd like a referral to Dr. Kyra Sy with [facility] (fax #[phone]) for a second opinion. Advised this nurse would update the team of above. Fei Emmanuel voiced understanding and gratitude for the call. No further questions or concerns. [facility]  [phone]    08/19/22 11:08 AM -  Called and spoke with the patient and her son, Fei Emmanuel. Patient's ID verified x 2. The patient gave verbal consent for us to speak with her son regarding her care. Advised patient of Dr. Karie Franklin response. The patient would like Dr. Linda Hung to place a referral to a multiple myeloma specialist at [facility]. The patient also inquired if Dr. Linda Hung could prescribe something to help her sleep. She states she cannot sleep, all she does is walk and pace. Her ambien prescription, which she has been on for years, does not help. Advised this nurse would update the team of above and call her back with recommendations. The patient voiced understanding and gratitude for the call. No further questions or concerns. [facility]  [phone]    08/19/22 11:43 AM - Called and spoke with the patient's son, Fei Emmanuel. Patient's ID verified x 2. Steffen Dong of NP's response. Fei Emmanuel states her PCP has retired and they will not see Dr. Keith King until 8/23/22. Advised Dr. Linda Hung has not seen the patient for 2 years so she cannot prescribe anything to help her sleep, per NP. Advised the referral has been placed and this nurse will fax the referral and records to [facility]. Fei Emmanuel voiced understanding and gratitude for the call. No further questions or concerns.

## 2022-08-18 NOTE — TELEPHONE ENCOUNTER
Patient son called to request a referral to the [facility] cancer center for patient. Stating patient is having symptoms from previous diagnosis and would like a second opinion from a DrImelda that was suggested.     # [phone]

## 2022-08-19 DIAGNOSIS — D47.2 SMOLDERING MULTIPLE MYELOMA: Primary | ICD-10-CM

## 2022-08-31 ENCOUNTER — TELEPHONE (OUTPATIENT)
Dept: ONCOLOGY | Age: 82
End: 2022-08-31

## 2022-08-31 DIAGNOSIS — D47.2 SMOLDERING MULTIPLE MYELOMA: Primary | ICD-10-CM

## 2022-08-31 NOTE — TELEPHONE ENCOUNTER
[facility] at [facility]  [phone]    08/31/22 3:16 PM - Called and spoke with the patient. Patient's ID verified x 2. She states she is scheduled to see Dr. Radnall Sumner at [facility] this Friday. Inquired if patient would like to keep her appointment with Dr. Quincy Maharaj. She states she'd like to keep it for now. Advised patient of the NP's message regarding her outstanding labs. The patient states she will call us back with an update after her appointment at [facility] on Friday. Advised this nurse would let the team know. The patient voiced understanding and gratitude for the call. No further questions or concerns. O-T Advancement Flap Text: The defect edges were debeveled with a #15 scalpel blade.  Given the location of the defect, shape of the defect and the proximity to free margins an O-T advancement flap was deemed most appropriate.  Using a sterile surgical marker, an appropriate advancement flap was drawn incorporating the defect and placing the expected incisions within the relaxed skin tension lines where possible.    The area thus outlined was incised deep to adipose tissue with a #15 scalpel blade.  The skin margins were undermined to an appropriate distance in all directions utilizing iris scissors.

## 2022-09-08 PROBLEM — E86.0 DEHYDRATION: Status: RESOLVED | Noted: 2022-08-09 | Resolved: 2022-09-08

## 2023-07-19 ENCOUNTER — HOSPITAL ENCOUNTER (OUTPATIENT)
Facility: HOSPITAL | Age: 83
Discharge: HOME | End: 2023-07-22
Payer: MEDICARE

## 2023-07-19 VITALS — BODY MASS INDEX: 24.98 KG/M2 | WEIGHT: 141 LBS | HEIGHT: 63 IN

## 2023-07-19 DIAGNOSIS — N36.9: ICD-10-CM

## 2023-07-19 PROCEDURE — A9579 GAD-BASE MR CONTRAST NOS,1ML: HCPCS | Performed by: RADIOLOGY

## 2023-07-19 PROCEDURE — 72197 MRI PELVIS W/O & W/DYE: CPT

## 2023-07-19 PROCEDURE — 6360000004 HC RX CONTRAST MEDICATION: Performed by: RADIOLOGY

## 2023-07-19 RX ADMIN — GADOTERIDOL 12 ML: 279.3 INJECTION, SOLUTION INTRAVENOUS at 06:41

## 2023-08-23 NOTE — PROGRESS NOTES
[address] at [place]  [phone]        10/21/19 11:07 AM Patient instructed on how to self-administer vitamin B12 injections. Patient successfully administered 1mL (1000mcg) of vitamin B12 to left thigh. Answered all questions to patient's satisfaction as well. Sotyktu Pregnancy And Lactation Text: There is insufficient data to evaluate whether or not Sotyktu is safe to use during pregnancy.? ?It is not known if Sotyktu passes into breast milk and whether or not it is safe to use when breastfeeding.??

## 2023-09-25 ENCOUNTER — OFFICE VISIT (OUTPATIENT)
Age: 83
End: 2023-09-25
Payer: MEDICARE

## 2023-09-25 VITALS
HEART RATE: 68 BPM | HEIGHT: 63 IN | TEMPERATURE: 98.3 F | WEIGHT: 144.6 LBS | DIASTOLIC BLOOD PRESSURE: 60 MMHG | SYSTOLIC BLOOD PRESSURE: 100 MMHG | OXYGEN SATURATION: 98 % | RESPIRATION RATE: 16 BRPM | BODY MASS INDEX: 25.62 KG/M2

## 2023-09-25 DIAGNOSIS — E53.8 B12 DEFICIENCY: ICD-10-CM

## 2023-09-25 DIAGNOSIS — D64.9 NORMOCYTIC ANEMIA, NOT DUE TO BLOOD LOSS: ICD-10-CM

## 2023-09-25 DIAGNOSIS — C90.00 MULTIPLE MYELOMA NOT HAVING ACHIEVED REMISSION (HCC): Primary | ICD-10-CM

## 2023-09-25 DIAGNOSIS — I10 PRIMARY HYPERTENSION: ICD-10-CM

## 2023-09-25 DIAGNOSIS — N28.9 RENAL INSUFFICIENCY: ICD-10-CM

## 2023-09-25 DIAGNOSIS — C90.00 MULTIPLE MYELOMA NOT HAVING ACHIEVED REMISSION (HCC): ICD-10-CM

## 2023-09-25 PROCEDURE — 1036F TOBACCO NON-USER: CPT | Performed by: INTERNAL MEDICINE

## 2023-09-25 PROCEDURE — 1123F ACP DISCUSS/DSCN MKR DOCD: CPT | Performed by: INTERNAL MEDICINE

## 2023-09-25 PROCEDURE — 1090F PRES/ABSN URINE INCON ASSESS: CPT | Performed by: INTERNAL MEDICINE

## 2023-09-25 PROCEDURE — 99214 OFFICE O/P EST MOD 30 MIN: CPT | Performed by: INTERNAL MEDICINE

## 2023-09-25 PROCEDURE — 3078F DIAST BP <80 MM HG: CPT | Performed by: INTERNAL MEDICINE

## 2023-09-25 PROCEDURE — 3075F SYST BP GE 130 - 139MM HG: CPT | Performed by: INTERNAL MEDICINE

## 2023-09-25 PROCEDURE — G8419 CALC BMI OUT NRM PARAM NOF/U: HCPCS | Performed by: INTERNAL MEDICINE

## 2023-09-25 PROCEDURE — G8400 PT W/DXA NO RESULTS DOC: HCPCS | Performed by: INTERNAL MEDICINE

## 2023-09-25 PROCEDURE — G8427 DOCREV CUR MEDS BY ELIG CLIN: HCPCS | Performed by: INTERNAL MEDICINE

## 2023-09-25 RX ORDER — ESTRADIOL 0.1 MG/G
CREAM VAGINAL
COMMUNITY
Start: 2023-07-31

## 2023-09-25 ASSESSMENT — PATIENT HEALTH QUESTIONNAIRE - PHQ9
SUM OF ALL RESPONSES TO PHQ QUESTIONS 1-9: 0
SUM OF ALL RESPONSES TO PHQ9 QUESTIONS 1 & 2: 0
1. LITTLE INTEREST OR PLEASURE IN DOING THINGS: 0
2. FEELING DOWN, DEPRESSED OR HOPELESS: 0
SUM OF ALL RESPONSES TO PHQ QUESTIONS 1-9: 0

## 2023-09-25 NOTE — PROGRESS NOTES
[address] [facility]  [phone]    Hematology / Oncology Consult    Reason for Visit:   Magda Quick is a 78 y.o. female who is seen for multiple myeloma. Hematology Oncology Treatment History:        Diagnosis: Multiple myeloma, smoldering     Stage: N/A     Pathology:   - 2/4/1998 Bone marrow biopsy: Normocellular marrow, no pathologic diagnosis. (No evidence of plasmacytosis). - 8/6/18 Bone marrow biopsy: Variably hypercellular marrow with monoclonal kappa restricted plasmacytosis, 40-50%. Trilineage hematopoiesis. Comment: The bone marrow is variably hypercellular for age ranging 20% to 70%. Monoclonal, kappa restricted plasmacytosis (40-50%) is identified. No atypical lymphocytic infiltrates are present. Trilineage hematopoiesis with maturation is present in the background. Clinical history indicates MGUS in 1990s with negative bone marrow in 1998 without plasmacytosis. Laboratory data indicate monoclonal IgG kappa of 3.6 g/dL with an increase in serum free kappa/lambda ratio of 37.9 (8/2/2018). Overall findings may represent asymptomatic (smoldering) plasma cell myeloma in the absence of myeloma-defining events. Correlation with clinical, laboratory and radiologic findings including MRI (> 1 focal lesion) is advised to determine evidence of end organ damage. Cytogenetics: 46, XX[20], normal  FISH: Del(13q), IGH rearrangement, t(14;20) detected     Prior Treatment: None     Current Treatment: Observation    History of Present Illness:   Magda Quick is a 78 y.o. female who comes in to re-establish care after not being seen for over 3 years. She has smoldering myeloma. She has previously been seen at [facility], [facility] and other providers. Had a h/o MGUS in 1990. 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 Normocytic anemia in 2017 resolved. She also had B12 deficiency requiring injections.      PMHX: HTN,

## 2023-09-26 LAB
ALBUMIN SERPL-MCNC: 3.4 G/DL (ref 3.5–5)
ALBUMIN/GLOB SERPL: 0.5 (ref 1.1–2.2)
ALP SERPL-CCNC: 72 U/L (ref 45–117)
ALT SERPL-CCNC: 18 U/L (ref 12–78)
ANION GAP SERPL CALC-SCNC: 3 MMOL/L (ref 5–15)
AST SERPL-CCNC: 15 U/L (ref 15–37)
BASOPHILS # BLD: 0 K/UL (ref 0–0.1)
BASOPHILS NFR BLD: 1 % (ref 0–1)
BILIRUB SERPL-MCNC: 0.3 MG/DL (ref 0.2–1)
BUN SERPL-MCNC: 16 MG/DL (ref 6–20)
BUN/CREAT SERPL: 13 (ref 12–20)
CALCIUM SERPL-MCNC: 9.4 MG/DL (ref 8.5–10.1)
CHLORIDE SERPL-SCNC: 105 MMOL/L (ref 97–108)
CO2 SERPL-SCNC: 28 MMOL/L (ref 21–32)
CREAT SERPL-MCNC: 1.25 MG/DL (ref 0.55–1.02)
DIFFERENTIAL METHOD BLD: ABNORMAL
EOSINOPHIL # BLD: 0.3 K/UL (ref 0–0.4)
EOSINOPHIL NFR BLD: 5 % (ref 0–7)
ERYTHROCYTE [DISTWIDTH] IN BLOOD BY AUTOMATED COUNT: 14.1 % (ref 11.5–14.5)
FERRITIN SERPL-MCNC: 68 NG/ML (ref 8–252)
FOLATE SERPL-MCNC: 19 NG/ML (ref 5–21)
GLOBULIN SER CALC-MCNC: 6.8 G/DL (ref 2–4)
GLUCOSE SERPL-MCNC: 104 MG/DL (ref 65–100)
HCT VFR BLD AUTO: 33.3 % (ref 35–47)
HGB BLD-MCNC: 10.8 G/DL (ref 11.5–16)
IMM GRANULOCYTES # BLD AUTO: 0 K/UL (ref 0–0.04)
IMM GRANULOCYTES NFR BLD AUTO: 0 % (ref 0–0.5)
IRON SATN MFR SERPL: 34 % (ref 20–50)
IRON SERPL-MCNC: 109 UG/DL (ref 35–150)
LYMPHOCYTES # BLD: 1.8 K/UL (ref 0.8–3.5)
LYMPHOCYTES NFR BLD: 38 % (ref 12–49)
MCH RBC QN AUTO: 31.3 PG (ref 26–34)
MCHC RBC AUTO-ENTMCNC: 32.4 G/DL (ref 30–36.5)
MCV RBC AUTO: 96.5 FL (ref 80–99)
MONOCYTES # BLD: 0.4 K/UL (ref 0–1)
MONOCYTES NFR BLD: 8 % (ref 5–13)
NEUTS SEG # BLD: 2.3 K/UL (ref 1.8–8)
NEUTS SEG NFR BLD: 48 % (ref 32–75)
NRBC # BLD: 0 K/UL (ref 0–0.01)
NRBC BLD-RTO: 0 PER 100 WBC
PLATELET # BLD AUTO: 179 K/UL (ref 150–400)
PMV BLD AUTO: 11.9 FL (ref 8.9–12.9)
POTASSIUM SERPL-SCNC: 3.6 MMOL/L (ref 3.5–5.1)
PROT SERPL-MCNC: 10.2 G/DL (ref 6.4–8.2)
RBC # BLD AUTO: 3.45 M/UL (ref 3.8–5.2)
RETICS # AUTO: 0.04 M/UL (ref 0.02–0.08)
RETICS/RBC NFR AUTO: 1.1 % (ref 0.7–2.1)
SODIUM SERPL-SCNC: 136 MMOL/L (ref 136–145)
TIBC SERPL-MCNC: 319 UG/DL (ref 250–450)
VIT B12 SERPL-MCNC: 566 PG/ML (ref 193–986)
WBC # BLD AUTO: 4.8 K/UL (ref 3.6–11)

## 2023-09-27 LAB — EPO SERPL-ACNC: 45.7 MIU/ML (ref 2.6–18.5)

## 2023-09-29 LAB
ALBUMIN SERPL ELPH-MCNC: 3.7 G/DL (ref 2.9–4.4)
ALBUMIN/GLOB SERPL: 0.7 (ref 0.7–1.7)
ALPHA1 GLOB SERPL ELPH-MCNC: 0.3 G/DL (ref 0–0.4)
ALPHA2 GLOB SERPL ELPH-MCNC: 0.8 G/DL (ref 0.4–1)
B-GLOBULIN SERPL ELPH-MCNC: 0.9 G/DL (ref 0.7–1.3)
GAMMA GLOB SERPL ELPH-MCNC: 3.9 G/DL (ref 0.4–1.8)
GLOBULIN SER-MCNC: 5.8 G/DL (ref 2.2–3.9)
IGA SERPL-MCNC: 6 MG/DL (ref 64–422)
IGG SERPL-MCNC: 4953 MG/DL (ref 586–1602)
IGM SERPL-MCNC: 14 MG/DL (ref 26–217)
INTERPRETATION SERPL IEP-IMP: ABNORMAL
KAPPA LC FREE SER-MCNC: 374.4 MG/L (ref 3.3–19.4)
KAPPA LC FREE/LAMBDA FREE SER: 50.59 (ref 0.26–1.65)
LAMBDA LC FREE SERPL-MCNC: 7.4 MG/L (ref 5.7–26.3)
M PROTEIN SERPL ELPH-MCNC: 3.7 G/DL
PROT SERPL-MCNC: 9.5 G/DL (ref 6–8.5)

## 2023-10-09 ENCOUNTER — APPOINTMENT (OUTPATIENT)
Facility: HOSPITAL | Age: 83
DRG: 378 | End: 2023-10-09
Payer: MEDICARE

## 2023-10-09 ENCOUNTER — HOSPITAL ENCOUNTER (INPATIENT)
Facility: HOSPITAL | Age: 83
LOS: 3 days | Discharge: HOME | DRG: 378 | End: 2023-10-12
Attending: EMERGENCY MEDICINE | Admitting: INTERNAL MEDICINE
Payer: MEDICARE

## 2023-10-09 DIAGNOSIS — N30.00 ACUTE CYSTITIS WITHOUT HEMATURIA: ICD-10-CM

## 2023-10-09 DIAGNOSIS — K92.1 GASTROINTESTINAL HEMORRHAGE WITH MELENA: Primary | ICD-10-CM

## 2023-10-09 DIAGNOSIS — E87.1 HYPONATREMIA: ICD-10-CM

## 2023-10-09 DIAGNOSIS — E87.6 HYPOKALEMIA: ICD-10-CM

## 2023-10-09 LAB
ALBUMIN SERPL-MCNC: 3.1 G/DL (ref 3.5–5)
ALBUMIN/GLOB SERPL: 0.5 (ref 1.1–2.2)
ALP SERPL-CCNC: 71 U/L (ref 45–117)
ALT SERPL-CCNC: 17 U/L (ref 12–78)
ANION GAP SERPL CALC-SCNC: 5 MMOL/L (ref 5–15)
APPEARANCE UR: ABNORMAL
AST SERPL-CCNC: 16 U/L (ref 15–37)
BACTERIA URNS QL MICRO: ABNORMAL /HPF
BASOPHILS # BLD: 0 K/UL (ref 0–0.1)
BASOPHILS NFR BLD: 0 % (ref 0–1)
BILIRUB SERPL-MCNC: 0.4 MG/DL (ref 0.2–1)
BILIRUB UR QL: NEGATIVE
BUN SERPL-MCNC: 15 MG/DL (ref 6–20)
BUN/CREAT SERPL: 12 (ref 12–20)
CALCIUM SERPL-MCNC: 8.4 MG/DL (ref 8.5–10.1)
CHLORIDE SERPL-SCNC: 102 MMOL/L (ref 97–108)
CO2 SERPL-SCNC: 27 MMOL/L (ref 21–32)
COLOR UR: YELLOW
COMMENT:: NORMAL
CREAT SERPL-MCNC: 1.22 MG/DL (ref 0.55–1.02)
DIFFERENTIAL METHOD BLD: ABNORMAL
EKG ATRIAL RATE: 80 BPM
EKG DIAGNOSIS: NORMAL
EKG P AXIS: 62 DEGREES
EKG P-R INTERVAL: 172 MS
EKG Q-T INTERVAL: 384 MS
EKG QRS DURATION: 86 MS
EKG QTC CALCULATION (BAZETT): 442 MS
EKG R AXIS: 14 DEGREES
EKG T AXIS: -14 DEGREES
EKG VENTRICULAR RATE: 80 BPM
EOSINOPHIL # BLD: 0.1 K/UL (ref 0–0.4)
EOSINOPHIL NFR BLD: 2 % (ref 0–7)
EPITH CASTS URNS QL MICRO: ABNORMAL /LPF
ERYTHROCYTE [DISTWIDTH] IN BLOOD BY AUTOMATED COUNT: 14.6 % (ref 11.5–14.5)
GLOBULIN SER CALC-MCNC: 6.7 G/DL (ref 2–4)
GLUCOSE SERPL-MCNC: 137 MG/DL (ref 65–100)
GLUCOSE UR STRIP.AUTO-MCNC: NEGATIVE MG/DL
HCT VFR BLD AUTO: 26.7 % (ref 35–47)
HEMOCCULT STL QL: POSITIVE
HGB BLD-MCNC: 8.9 G/DL (ref 11.5–16)
HGB UR QL STRIP: NEGATIVE
IMM GRANULOCYTES # BLD AUTO: 0 K/UL (ref 0–0.04)
IMM GRANULOCYTES NFR BLD AUTO: 0 % (ref 0–0.5)
KETONES UR QL STRIP.AUTO: ABNORMAL MG/DL
LACTATE SERPL-SCNC: 1.4 MMOL/L (ref 0.4–2)
LEUKOCYTE ESTERASE UR QL STRIP.AUTO: ABNORMAL
LYMPHOCYTES # BLD: 1.6 K/UL (ref 0.8–3.5)
LYMPHOCYTES NFR BLD: 33 % (ref 12–49)
MAGNESIUM SERPL-MCNC: 2.2 MG/DL (ref 1.6–2.4)
MCH RBC QN AUTO: 31.4 PG (ref 26–34)
MCHC RBC AUTO-ENTMCNC: 33.3 G/DL (ref 30–36.5)
MCV RBC AUTO: 94.3 FL (ref 80–99)
MONOCYTES # BLD: 0.3 K/UL (ref 0–1)
MONOCYTES NFR BLD: 6 % (ref 5–13)
NEUTS SEG # BLD: 2.7 K/UL (ref 1.8–8)
NEUTS SEG NFR BLD: 59 % (ref 32–75)
NITRITE UR QL STRIP.AUTO: NEGATIVE
NRBC # BLD: 0 K/UL (ref 0–0.01)
NRBC BLD-RTO: 0 PER 100 WBC
PH UR STRIP: 7 (ref 5–8)
PLATELET # BLD AUTO: 175 K/UL (ref 150–400)
PMV BLD AUTO: 11.3 FL (ref 8.9–12.9)
POTASSIUM SERPL-SCNC: 3.2 MMOL/L (ref 3.5–5.1)
PROT SERPL-MCNC: 9.8 G/DL (ref 6.4–8.2)
PROT UR STRIP-MCNC: 30 MG/DL
RBC # BLD AUTO: 2.83 M/UL (ref 3.8–5.2)
RBC #/AREA URNS HPF: ABNORMAL /HPF (ref 0–5)
RETICS # AUTO: 0.06 M/UL (ref 0.02–0.08)
RETICS/RBC NFR AUTO: 2.5 % (ref 0.7–2.1)
SODIUM SERPL-SCNC: 134 MMOL/L (ref 136–145)
SP GR UR REFRACTOMETRY: 1.02 (ref 1–1.03)
SPECIMEN HOLD: NORMAL
TROPONIN I SERPL HS-MCNC: 9 NG/L (ref 0–51)
UROBILINOGEN UR QL STRIP.AUTO: 1 EU/DL (ref 0.2–1)
WBC # BLD AUTO: 4.7 K/UL (ref 3.6–11)
WBC URNS QL MICRO: ABNORMAL /HPF (ref 0–4)

## 2023-10-09 PROCEDURE — A4216 STERILE WATER/SALINE, 10 ML: HCPCS

## 2023-10-09 PROCEDURE — 93005 ELECTROCARDIOGRAM TRACING: CPT

## 2023-10-09 PROCEDURE — 85018 HEMOGLOBIN: CPT

## 2023-10-09 PROCEDURE — 86922 COMPATIBILITY TEST ANTIGLOB: CPT

## 2023-10-09 PROCEDURE — 2580000003 HC RX 258: Performed by: INTERNAL MEDICINE

## 2023-10-09 PROCEDURE — 83010 ASSAY OF HAPTOGLOBIN QUANT: CPT

## 2023-10-09 PROCEDURE — 85014 HEMATOCRIT: CPT

## 2023-10-09 PROCEDURE — 83615 LACTATE (LD) (LDH) ENZYME: CPT

## 2023-10-09 PROCEDURE — 74174 CTA ABD&PLVS W/CONTRAST: CPT

## 2023-10-09 PROCEDURE — 86850 RBC ANTIBODY SCREEN: CPT

## 2023-10-09 PROCEDURE — 82728 ASSAY OF FERRITIN: CPT

## 2023-10-09 PROCEDURE — 80053 COMPREHEN METABOLIC PANEL: CPT

## 2023-10-09 PROCEDURE — 82607 VITAMIN B-12: CPT

## 2023-10-09 PROCEDURE — 82746 ASSAY OF FOLIC ACID SERUM: CPT

## 2023-10-09 PROCEDURE — C9113 INJ PANTOPRAZOLE SODIUM, VIA: HCPCS

## 2023-10-09 PROCEDURE — 93010 ELECTROCARDIOGRAM REPORT: CPT | Performed by: INTERNAL MEDICINE

## 2023-10-09 PROCEDURE — 81001 URINALYSIS AUTO W/SCOPE: CPT

## 2023-10-09 PROCEDURE — 83540 ASSAY OF IRON: CPT

## 2023-10-09 PROCEDURE — 85025 COMPLETE CBC W/AUTO DIFF WBC: CPT

## 2023-10-09 PROCEDURE — 83735 ASSAY OF MAGNESIUM: CPT

## 2023-10-09 PROCEDURE — 86880 COOMBS TEST DIRECT: CPT

## 2023-10-09 PROCEDURE — 82272 OCCULT BLD FECES 1-3 TESTS: CPT

## 2023-10-09 PROCEDURE — 86921 COMPATIBILITY TEST INCUBATE: CPT

## 2023-10-09 PROCEDURE — 96375 TX/PRO/DX INJ NEW DRUG ADDON: CPT

## 2023-10-09 PROCEDURE — 36415 COLL VENOUS BLD VENIPUNCTURE: CPT

## 2023-10-09 PROCEDURE — 83605 ASSAY OF LACTIC ACID: CPT

## 2023-10-09 PROCEDURE — 86901 BLOOD TYPING SEROLOGIC RH(D): CPT

## 2023-10-09 PROCEDURE — 83550 IRON BINDING TEST: CPT

## 2023-10-09 PROCEDURE — 86900 BLOOD TYPING SEROLOGIC ABO: CPT

## 2023-10-09 PROCEDURE — 6360000004 HC RX CONTRAST MEDICATION

## 2023-10-09 PROCEDURE — 99285 EMERGENCY DEPT VISIT HI MDM: CPT

## 2023-10-09 PROCEDURE — 2580000003 HC RX 258

## 2023-10-09 PROCEDURE — 85045 AUTOMATED RETICULOCYTE COUNT: CPT

## 2023-10-09 PROCEDURE — 6360000002 HC RX W HCPCS

## 2023-10-09 PROCEDURE — 96374 THER/PROPH/DIAG INJ IV PUSH: CPT

## 2023-10-09 PROCEDURE — 1100000000 HC RM PRIVATE

## 2023-10-09 PROCEDURE — 84484 ASSAY OF TROPONIN QUANT: CPT

## 2023-10-09 PROCEDURE — 86920 COMPATIBILITY TEST SPIN: CPT

## 2023-10-09 RX ORDER — ACETAMINOPHEN 650 MG/1
650 SUPPOSITORY RECTAL EVERY 6 HOURS PRN
Status: DISCONTINUED | OUTPATIENT
Start: 2023-10-09 | End: 2023-10-12 | Stop reason: HOSPADM

## 2023-10-09 RX ORDER — OMEPRAZOLE 40 MG/1
40 CAPSULE, DELAYED RELEASE ORAL DAILY
Status: ON HOLD | COMMUNITY
End: 2023-10-12 | Stop reason: HOSPADM

## 2023-10-09 RX ORDER — POLYETHYLENE GLYCOL 3350 17 G/17G
17 POWDER, FOR SOLUTION ORAL DAILY PRN
Status: DISCONTINUED | OUTPATIENT
Start: 2023-10-09 | End: 2023-10-12 | Stop reason: HOSPADM

## 2023-10-09 RX ORDER — SODIUM CHLORIDE 9 MG/ML
INJECTION, SOLUTION INTRAVENOUS PRN
Status: DISCONTINUED | OUTPATIENT
Start: 2023-10-09 | End: 2023-10-12 | Stop reason: HOSPADM

## 2023-10-09 RX ORDER — SODIUM CHLORIDE 0.9 % (FLUSH) 0.9 %
5-40 SYRINGE (ML) INJECTION EVERY 12 HOURS SCHEDULED
Status: DISCONTINUED | OUTPATIENT
Start: 2023-10-09 | End: 2023-10-12 | Stop reason: HOSPADM

## 2023-10-09 RX ORDER — ONDANSETRON 2 MG/ML
4 INJECTION INTRAMUSCULAR; INTRAVENOUS EVERY 6 HOURS PRN
Status: DISCONTINUED | OUTPATIENT
Start: 2023-10-09 | End: 2023-10-12 | Stop reason: HOSPADM

## 2023-10-09 RX ORDER — POTASSIUM CHLORIDE 7.45 MG/ML
10 INJECTION INTRAVENOUS
Status: COMPLETED | OUTPATIENT
Start: 2023-10-09 | End: 2023-10-09

## 2023-10-09 RX ORDER — DULOXETIN HYDROCHLORIDE 30 MG/1
30 CAPSULE, DELAYED RELEASE ORAL EVERY MORNING
Status: DISCONTINUED | OUTPATIENT
Start: 2023-10-10 | End: 2023-10-12 | Stop reason: HOSPADM

## 2023-10-09 RX ORDER — LABETALOL HYDROCHLORIDE 5 MG/ML
10 INJECTION, SOLUTION INTRAVENOUS ONCE
Status: COMPLETED | OUTPATIENT
Start: 2023-10-09 | End: 2023-10-09

## 2023-10-09 RX ORDER — ACETAMINOPHEN 325 MG/1
650 TABLET ORAL EVERY 6 HOURS PRN
Status: DISCONTINUED | OUTPATIENT
Start: 2023-10-09 | End: 2023-10-12 | Stop reason: HOSPADM

## 2023-10-09 RX ORDER — ZOLPIDEM TARTRATE 5 MG/1
10 TABLET ORAL NIGHTLY
Status: DISCONTINUED | OUTPATIENT
Start: 2023-10-09 | End: 2023-10-09

## 2023-10-09 RX ORDER — PANTOPRAZOLE SODIUM 40 MG/10ML
40 INJECTION, POWDER, LYOPHILIZED, FOR SOLUTION INTRAVENOUS EVERY 12 HOURS
Status: DISCONTINUED | OUTPATIENT
Start: 2023-10-10 | End: 2023-10-12 | Stop reason: HOSPADM

## 2023-10-09 RX ORDER — ONDANSETRON 4 MG/1
4 TABLET, ORALLY DISINTEGRATING ORAL EVERY 8 HOURS PRN
Status: DISCONTINUED | OUTPATIENT
Start: 2023-10-09 | End: 2023-10-12 | Stop reason: HOSPADM

## 2023-10-09 RX ORDER — ZOLPIDEM TARTRATE 5 MG/1
5 TABLET ORAL NIGHTLY PRN
Status: DISCONTINUED | OUTPATIENT
Start: 2023-10-09 | End: 2023-10-12 | Stop reason: HOSPADM

## 2023-10-09 RX ORDER — 0.9 % SODIUM CHLORIDE 0.9 %
1000 INTRAVENOUS SOLUTION INTRAVENOUS ONCE
Status: COMPLETED | OUTPATIENT
Start: 2023-10-09 | End: 2023-10-09

## 2023-10-09 RX ORDER — SODIUM CHLORIDE 0.9 % (FLUSH) 0.9 %
5-40 SYRINGE (ML) INJECTION PRN
Status: DISCONTINUED | OUTPATIENT
Start: 2023-10-09 | End: 2023-10-12 | Stop reason: HOSPADM

## 2023-10-09 RX ADMIN — LABETALOL HYDROCHLORIDE 10 MG: 5 INJECTION, SOLUTION INTRAVENOUS at 23:30

## 2023-10-09 RX ADMIN — POTASSIUM CHLORIDE 10 MEQ: 7.45 INJECTION INTRAVENOUS at 18:30

## 2023-10-09 RX ADMIN — PANTOPRAZOLE SODIUM 40 MG: 40 INJECTION, POWDER, FOR SOLUTION INTRAVENOUS at 18:01

## 2023-10-09 RX ADMIN — SODIUM CHLORIDE, PRESERVATIVE FREE 10 ML: 5 INJECTION INTRAVENOUS at 22:42

## 2023-10-09 RX ADMIN — IOPAMIDOL 100 ML: 755 INJECTION, SOLUTION INTRAVENOUS at 15:48

## 2023-10-09 RX ADMIN — SODIUM CHLORIDE 1000 ML: 9 INJECTION, SOLUTION INTRAVENOUS at 18:06

## 2023-10-09 RX ADMIN — WATER 1000 MG: 1 INJECTION INTRAMUSCULAR; INTRAVENOUS; SUBCUTANEOUS at 18:01

## 2023-10-09 ASSESSMENT — PAIN SCALES - GENERAL
PAINLEVEL_OUTOF10: 5
PAINLEVEL_OUTOF10: 0

## 2023-10-09 ASSESSMENT — ENCOUNTER SYMPTOMS
SHORTNESS OF BREATH: 0
ABDOMINAL PAIN: 1
VOMITING: 0
DIARRHEA: 1
BLOOD IN STOOL: 1
NAUSEA: 1

## 2023-10-09 ASSESSMENT — PAIN DESCRIPTION - LOCATION: LOCATION: ABDOMEN

## 2023-10-09 ASSESSMENT — PAIN DESCRIPTION - ORIENTATION: ORIENTATION: LOWER;LEFT

## 2023-10-09 ASSESSMENT — PAIN - FUNCTIONAL ASSESSMENT: PAIN_FUNCTIONAL_ASSESSMENT: 0-10

## 2023-10-09 NOTE — ED NOTES
Report given to [facility] [place], RN  10/09/23 5418

## 2023-10-09 NOTE — ED TRIAGE NOTES
Pt returned from CT after they infiltrated her iv, pt reports she went to bathroom again and had a loose stool, another iv placed and fluids begun again

## 2023-10-09 NOTE — ED PROVIDER NOTES
[facility] EMERGENCY DEPT  EMERGENCY DEPARTMENT ENCOUNTER      Pt Name: Ramin Albrecht  MRN: [id number]  [address] [facility] [place] 1940  Date of evaluation: 10/9/2023  Provider: Krisetn Jolley PA-C    CHIEF COMPLAINT       Chief Complaint   Patient presents with    Abdominal Pain    Melena         HISTORY OF PRESENT ILLNESS   (Location/Symptom, Timing/Onset, Context/Setting, Quality, Duration, Modifying Factors, Severity)  Note limiting factors. Pt is a 78 yr old female with PMHx diverticulitis, PUD, HTN presenting to the emergency department for evaluation of abdominal pain and melena x3 days. States that three days ago she began to experience multiple episodes of dark colored stools that is \"watery\". States that she then developed LLQ and RLQ abd pain in conjunction with nausea. No vomiting or fever. No dysuria. States that as symptoms have progressed she has also developed dizziness with ambulation. No chest pain or SOB. The history is provided by the patient. No  was used. Review of External Medical Records:     Nursing Notes were reviewed. REVIEW OF SYSTEMS    (2-9 systems for level 4, 10 or more for level 5)     Review of Systems   Constitutional:  Negative for fever. Respiratory:  Negative for shortness of breath. Cardiovascular:  Negative for chest pain. Gastrointestinal:  Positive for abdominal pain, blood in stool, diarrhea and nausea. Negative for vomiting. Genitourinary:  Negative for dysuria. Neurological:  Positive for dizziness. Except as noted above the remainder of the review of systems was reviewed and negative. PAST MEDICAL HISTORY     Past Medical History:   Diagnosis Date    Anemia     Autoimmune disease ([address])     MGUS    smoldering myloma    Cancer ([address]) 2015    Mass on lung. surgically removed. Hx of completed stroke     Unsure of date. Years ago.     Hx of mammogram 03/08/2016    normal    Hypertension     Monoclonal gammopathies     Pap smear Suspicion: No  Sepsis present:  No  Reassessment needed: No  Code Status:  Full Code  Readmission: No  Isolation Requirements: no  Recommended Level of Care: med/surg  Department: [facility] ED - [phone]  Consulting Provider: Dr. Sam Contreras    Other: 75-year-old female with history of diverticulitis and peptic ulcer disease presenting for 3-day history of melena and diffuse abdominal pain. CBC today notable for hemoglobin of 8.9, two-point drop since labs were obtained 2 weeks ago. Also noted to have UTI on UA. Hypokalemia on CMP. CT final read pending at this time. Started on IV fluids, Protonix and Rocephin in ER      PROCEDURES:  Unless otherwise noted below, none     Procedures      FINAL IMPRESSION      1. Gastrointestinal hemorrhage with melena    2. Acute cystitis without hematuria    3. Hypokalemia    4. Hyponatremia          DISPOSITION/PLAN   DISPOSITION Decision To Admit 10/09/2023 04:00:21 PM      PATIENT REFERRED TO:  No follow-up provider specified.     DISCHARGE MEDICATIONS:  New Prescriptions    No medications on file         (Please note that portions of this note were completed with a voice recognition program.  Efforts were made to edit the dictations but occasionally words are mis-transcribed.)    Lincoln Kellogg PA-C (electronically signed)  Emergency Attending Physician / Physician Assistant / Nurse Practitioner             Lincoln Kellogg PA-C  10/09/23 [phone]

## 2023-10-10 ENCOUNTER — APPOINTMENT (OUTPATIENT)
Facility: HOSPITAL | Age: 83
DRG: 378 | End: 2023-10-10
Payer: MEDICARE

## 2023-10-10 LAB
ANION GAP SERPL CALC-SCNC: 2 MMOL/L (ref 5–15)
BASOPHILS # BLD: 0 K/UL (ref 0–0.1)
BASOPHILS NFR BLD: 1 % (ref 0–1)
BUN SERPL-MCNC: 11 MG/DL (ref 6–20)
BUN/CREAT SERPL: 11 (ref 12–20)
CALCIUM SERPL-MCNC: 7.9 MG/DL (ref 8.5–10.1)
CHLORIDE SERPL-SCNC: 107 MMOL/L (ref 97–108)
CO2 SERPL-SCNC: 28 MMOL/L (ref 21–32)
CREAT SERPL-MCNC: 1.03 MG/DL (ref 0.55–1.02)
DIFFERENTIAL METHOD BLD: ABNORMAL
EOSINOPHIL # BLD: 0.2 K/UL (ref 0–0.4)
EOSINOPHIL NFR BLD: 5 % (ref 0–7)
ERYTHROCYTE [DISTWIDTH] IN BLOOD BY AUTOMATED COUNT: 14.9 % (ref 11.5–14.5)
ERYTHROCYTE [DISTWIDTH] IN BLOOD BY AUTOMATED COUNT: 15 % (ref 11.5–14.5)
FERRITIN SERPL-MCNC: 81 NG/ML (ref 26–388)
FOLATE SERPL-MCNC: 41.5 NG/ML (ref 5–21)
GLUCOSE SERPL-MCNC: 110 MG/DL (ref 65–100)
HAPTOGLOB SERPL-MCNC: 84 MG/DL (ref 30–200)
HCT VFR BLD AUTO: 22.1 % (ref 35–47)
HCT VFR BLD AUTO: 23.1 % (ref 35–47)
HCT VFR BLD AUTO: 23.2 % (ref 35–47)
HCT VFR BLD AUTO: 23.9 % (ref 35–47)
HCT VFR BLD AUTO: 25 % (ref 35–47)
HCT VFR BLD AUTO: 25.6 % (ref 35–47)
HGB BLD-MCNC: 7.4 G/DL (ref 11.5–16)
HGB BLD-MCNC: 7.5 G/DL (ref 11.5–16)
HGB BLD-MCNC: 7.6 G/DL (ref 11.5–16)
HGB BLD-MCNC: 7.9 G/DL (ref 11.5–16)
HGB BLD-MCNC: 8.4 G/DL (ref 11.5–16)
HGB BLD-MCNC: 8.4 G/DL (ref 11.5–16)
IMM GRANULOCYTES # BLD AUTO: 0 K/UL (ref 0–0.04)
IMM GRANULOCYTES NFR BLD AUTO: 0 % (ref 0–0.5)
IRON SATN MFR SERPL: 26 % (ref 20–50)
IRON SERPL-MCNC: 89 UG/DL (ref 35–150)
LDH SERPL L TO P-CCNC: 367 U/L (ref 81–246)
LYMPHOCYTES # BLD: 1.8 K/UL (ref 0.8–3.5)
LYMPHOCYTES NFR BLD: 36 % (ref 12–49)
MCH RBC QN AUTO: 30.2 PG (ref 26–34)
MCH RBC QN AUTO: 31.1 PG (ref 26–34)
MCHC RBC AUTO-ENTMCNC: 32 G/DL (ref 30–36.5)
MCHC RBC AUTO-ENTMCNC: 33.1 G/DL (ref 30–36.5)
MCV RBC AUTO: 94.1 FL (ref 80–99)
MCV RBC AUTO: 94.3 FL (ref 80–99)
MONOCYTES # BLD: 0.3 K/UL (ref 0–1)
MONOCYTES NFR BLD: 6 % (ref 5–13)
NEUTS SEG # BLD: 2.7 K/UL (ref 1.8–8)
NEUTS SEG NFR BLD: 52 % (ref 32–75)
NRBC # BLD: 0 K/UL (ref 0–0.01)
NRBC # BLD: 0 K/UL (ref 0–0.01)
NRBC BLD-RTO: 0 PER 100 WBC
NRBC BLD-RTO: 0 PER 100 WBC
PHOSPHATE SERPL-MCNC: 3.6 MG/DL (ref 2.6–4.7)
PLATELET # BLD AUTO: 160 K/UL (ref 150–400)
PLATELET # BLD AUTO: 188 K/UL (ref 150–400)
PMV BLD AUTO: 10.9 FL (ref 8.9–12.9)
PMV BLD AUTO: 11 FL (ref 8.9–12.9)
POTASSIUM SERPL-SCNC: 4.2 MMOL/L (ref 3.5–5.1)
RBC # BLD AUTO: 2.45 M/UL (ref 3.8–5.2)
RBC # BLD AUTO: 2.54 M/UL (ref 3.8–5.2)
SODIUM SERPL-SCNC: 137 MMOL/L (ref 136–145)
TIBC SERPL-MCNC: 342 UG/DL (ref 250–450)
VIT B12 SERPL-MCNC: 874 PG/ML (ref 193–986)
WBC # BLD AUTO: 5.1 K/UL (ref 3.6–11)
WBC # BLD AUTO: 7.4 K/UL (ref 3.6–11)

## 2023-10-10 PROCEDURE — 2580000003 HC RX 258: Performed by: INTERNAL MEDICINE

## 2023-10-10 PROCEDURE — 87086 URINE CULTURE/COLONY COUNT: CPT

## 2023-10-10 PROCEDURE — 6370000000 HC RX 637 (ALT 250 FOR IP): Performed by: INTERNAL MEDICINE

## 2023-10-10 PROCEDURE — 6360000002 HC RX W HCPCS: Performed by: INTERNAL MEDICINE

## 2023-10-10 PROCEDURE — 80053 COMPREHEN METABOLIC PANEL: CPT

## 2023-10-10 PROCEDURE — 93005 ELECTROCARDIOGRAM TRACING: CPT

## 2023-10-10 PROCEDURE — 85027 COMPLETE CBC AUTOMATED: CPT

## 2023-10-10 PROCEDURE — 97530 THERAPEUTIC ACTIVITIES: CPT

## 2023-10-10 PROCEDURE — 85379 FIBRIN DEGRADATION QUANT: CPT

## 2023-10-10 PROCEDURE — 6360000002 HC RX W HCPCS

## 2023-10-10 PROCEDURE — 85025 COMPLETE CBC W/AUTO DIFF WBC: CPT

## 2023-10-10 PROCEDURE — C9113 INJ PANTOPRAZOLE SODIUM, VIA: HCPCS | Performed by: INTERNAL MEDICINE

## 2023-10-10 PROCEDURE — 84100 ASSAY OF PHOSPHORUS: CPT

## 2023-10-10 PROCEDURE — 36415 COLL VENOUS BLD VENIPUNCTURE: CPT

## 2023-10-10 PROCEDURE — 94761 N-INVAS EAR/PLS OXIMETRY MLT: CPT

## 2023-10-10 PROCEDURE — 71045 X-RAY EXAM CHEST 1 VIEW: CPT

## 2023-10-10 PROCEDURE — 97161 PT EVAL LOW COMPLEX 20 MIN: CPT

## 2023-10-10 PROCEDURE — 2060000000 HC ICU INTERMEDIATE R&B

## 2023-10-10 PROCEDURE — 84484 ASSAY OF TROPONIN QUANT: CPT

## 2023-10-10 RX ORDER — SODIUM CHLORIDE 9 MG/ML
INJECTION, SOLUTION INTRAVENOUS CONTINUOUS
Status: DISCONTINUED | OUTPATIENT
Start: 2023-10-10 | End: 2023-10-11

## 2023-10-10 RX ORDER — LABETALOL HYDROCHLORIDE 5 MG/ML
5 INJECTION, SOLUTION INTRAVENOUS ONCE
Status: DISCONTINUED | OUTPATIENT
Start: 2023-10-10 | End: 2023-10-10

## 2023-10-10 RX ORDER — HYDRALAZINE HYDROCHLORIDE 20 MG/ML
10 INJECTION INTRAMUSCULAR; INTRAVENOUS EVERY 6 HOURS PRN
Status: DISCONTINUED | OUTPATIENT
Start: 2023-10-10 | End: 2023-10-12 | Stop reason: HOSPADM

## 2023-10-10 RX ORDER — LORAZEPAM 2 MG/ML
0.5 INJECTION INTRAMUSCULAR ONCE
Status: COMPLETED | OUTPATIENT
Start: 2023-10-10 | End: 2023-10-10

## 2023-10-10 RX ORDER — HYDRALAZINE HYDROCHLORIDE 20 MG/ML
10 INJECTION INTRAMUSCULAR; INTRAVENOUS ONCE
Status: DISCONTINUED | OUTPATIENT
Start: 2023-10-10 | End: 2023-10-10

## 2023-10-10 RX ADMIN — ACETAMINOPHEN 650 MG: 325 TABLET ORAL at 22:07

## 2023-10-10 RX ADMIN — LORAZEPAM 0.5 MG: 2 INJECTION INTRAMUSCULAR; INTRAVENOUS at 00:33

## 2023-10-10 RX ADMIN — SODIUM CHLORIDE, PRESERVATIVE FREE 10 ML: 5 INJECTION INTRAVENOUS at 22:10

## 2023-10-10 RX ADMIN — ACETAMINOPHEN 650 MG: 325 TABLET ORAL at 10:17

## 2023-10-10 RX ADMIN — DULOXETINE HYDROCHLORIDE 30 MG: 30 CAPSULE, DELAYED RELEASE ORAL at 10:09

## 2023-10-10 RX ADMIN — ACETAMINOPHEN 650 MG: 325 TABLET ORAL at 17:40

## 2023-10-10 RX ADMIN — PANTOPRAZOLE SODIUM 40 MG: 40 INJECTION, POWDER, FOR SOLUTION INTRAVENOUS at 07:34

## 2023-10-10 RX ADMIN — WATER 1000 MG: 1 INJECTION INTRAMUSCULAR; INTRAVENOUS; SUBCUTANEOUS at 17:40

## 2023-10-10 RX ADMIN — ZOLPIDEM TARTRATE 5 MG: 5 TABLET ORAL at 21:16

## 2023-10-10 RX ADMIN — PANTOPRAZOLE SODIUM 40 MG: 40 INJECTION, POWDER, FOR SOLUTION INTRAVENOUS at 17:40

## 2023-10-10 RX ADMIN — HYDRALAZINE HYDROCHLORIDE 10 MG: 20 INJECTION INTRAMUSCULAR; INTRAVENOUS at 22:09

## 2023-10-10 RX ADMIN — HYDRALAZINE HYDROCHLORIDE 10 MG: 20 INJECTION INTRAMUSCULAR; INTRAVENOUS at 16:33

## 2023-10-10 RX ADMIN — SODIUM CHLORIDE, PRESERVATIVE FREE 10 ML: 5 INJECTION INTRAVENOUS at 10:10

## 2023-10-10 ASSESSMENT — PAIN DESCRIPTION - PAIN TYPE
TYPE: ACUTE PAIN
TYPE: CHRONIC PAIN

## 2023-10-10 ASSESSMENT — PAIN DESCRIPTION - ORIENTATION
ORIENTATION: MID
ORIENTATION: ANTERIOR

## 2023-10-10 ASSESSMENT — PAIN DESCRIPTION - DIRECTION: RADIATING_TOWARDS: LEFT ARM AND BACK

## 2023-10-10 ASSESSMENT — PAIN SCALES - GENERAL
PAINLEVEL_OUTOF10: 0
PAINLEVEL_OUTOF10: 5
PAINLEVEL_OUTOF10: 5

## 2023-10-10 ASSESSMENT — PAIN DESCRIPTION - ONSET
ONSET: ON-GOING
ONSET: SUDDEN

## 2023-10-10 ASSESSMENT — PAIN DESCRIPTION - FREQUENCY: FREQUENCY: CONTINUOUS

## 2023-10-10 ASSESSMENT — PAIN - FUNCTIONAL ASSESSMENT
PAIN_FUNCTIONAL_ASSESSMENT: ACTIVITIES ARE NOT PREVENTED
PAIN_FUNCTIONAL_ASSESSMENT: ACTIVITIES ARE NOT PREVENTED

## 2023-10-10 ASSESSMENT — PAIN DESCRIPTION - DESCRIPTORS
DESCRIPTORS: PRESSURE
DESCRIPTORS: THROBBING

## 2023-10-10 ASSESSMENT — PAIN DESCRIPTION - LOCATION
LOCATION: CHEST
LOCATION: HEAD

## 2023-10-10 NOTE — PROGRESS NOTES
Hospitalist Progress Note  Freddy Pruitt MD  Answering service: [phone] OR [phone] from in house phone        Date of Service:  49/67/4082  NAME:  Elba Myers  :  1940  MRN:  [id number]      Admission Summary/HPI:   Elba Myers is a 78 y.o. female with a past medical history of hypertension, MGUS, anemia, diverticulosis, who presented to the emergency room due to black stool. Interval history / Subjective:   Abdominal pain improved but still present. No further GI bleeding. Awaiting GI evaluation. Hemoglobin dropped to 7.5 and is stable     Assessment & Plan:     Acute GI bleed, POA  --Acute GI bleed/with bright red blood per rectum  --Hemoglobin slightly dropped, will trend  --Start Protonix every 12 hours  --Stop anticoagulant  --Consult GI  --Make n.p.o.  --Gentle IV fluids while n.p.o.  --Trend H&H  --Vital signs as per unit routine  --Continuous telemetry     UTI, POA  --Positive urinalysis with leukocytosis   --Start ceftriaxone  --Blood and urine cultures not yet sent  --IV fluid as needed  --Vital signs as per unit routine     Sleep disorder  --Continue Ambien     MGUS  --Has baseline mild anemia     Hypokalemia, POA  --Replete potassium  --Trend renal panels  --Telemetry      I have independently reviewed and interpreted patient's lab and other diagnostic data  External notes were reviewed  Critical Care Time: 0 excluding procedures    Code status:Full  Prophylaxis: Heparin, SCD  Care Plan discussed with: Patient, RN, Multidisciplinary Rounds  Anticipated Disposition:      Principal Problem:    GI bleed  Resolved Problems:    * No resolved hospital problems. *           Review of Systems:   Pertinent items are noted in HPI. Vital Signs:    Last 24hrs VS reviewed since prior progress note.  Most recent are:    Patient Vitals for the past 24 hrs:   Temp Pulse Resp BP SpO2   10/10/23

## 2023-10-10 NOTE — PLAN OF CARE
Problem: Neurosensory - Adult  Goal: Achieves stable or improved neurological status  Outcome: Progressing  Goal: Absence of seizures  Outcome: Progressing  Goal: Remains free of injury related to seizures activity  Outcome: Progressing  Goal: Achieves maximal functionality and self care  Outcome: Progressing     Problem: Cardiovascular - Adult  Goal: Maintains optimal cardiac output and hemodynamic stability  Outcome: Progressing  Goal: Absence of cardiac dysrhythmias or at baseline  Outcome: Progressing

## 2023-10-10 NOTE — ED NOTES
Pt known difficult IV stick, multiple attempts made. RN currently unable to obtain needed blood work and give ordered IV labetalol. Other RN to attempt IV insertion.      Austin Baron RN  10/09/23 7484

## 2023-10-10 NOTE — PROGRESS NOTES
OT order received, chart reviewed, discussed patient status with RN and PT. Patient is independent with ADL and up ad court without need for assistive devices.  OT services not indicated   Birdie Mcdaniels, OTR/L

## 2023-10-11 ENCOUNTER — ANESTHESIA (OUTPATIENT)
Facility: HOSPITAL | Age: 83
DRG: 378 | End: 2023-10-11
Payer: MEDICARE

## 2023-10-11 ENCOUNTER — ANESTHESIA EVENT (OUTPATIENT)
Facility: HOSPITAL | Age: 83
DRG: 378 | End: 2023-10-11
Payer: MEDICARE

## 2023-10-11 LAB
ALBUMIN SERPL-MCNC: 3.1 G/DL (ref 3.5–5)
ALBUMIN/GLOB SERPL: 0.5 (ref 1.1–2.2)
ALP SERPL-CCNC: 68 U/L (ref 45–117)
ALT SERPL-CCNC: 13 U/L (ref 12–78)
ANION GAP SERPL CALC-SCNC: 13 MMOL/L (ref 5–15)
ANION GAP SERPL CALC-SCNC: 5 MMOL/L (ref 5–15)
AST SERPL-CCNC: 17 U/L (ref 15–37)
BACTERIA SPEC CULT: NORMAL
BILIRUB SERPL-MCNC: 0.3 MG/DL (ref 0.2–1)
BUN SERPL-MCNC: 13 MG/DL (ref 6–20)
BUN SERPL-MCNC: 8 MG/DL (ref 6–20)
BUN/CREAT SERPL: 11 (ref 12–20)
BUN/CREAT SERPL: 15 (ref 12–20)
CALCIUM SERPL-MCNC: 7.1 MG/DL (ref 8.5–10.1)
CALCIUM SERPL-MCNC: 8.5 MG/DL (ref 8.5–10.1)
CHLORIDE SERPL-SCNC: 105 MMOL/L (ref 97–108)
CHLORIDE SERPL-SCNC: 107 MMOL/L (ref 97–108)
CO2 SERPL-SCNC: 16 MMOL/L (ref 21–32)
CO2 SERPL-SCNC: 25 MMOL/L (ref 21–32)
COMMENT:: NORMAL
CREAT SERPL-MCNC: 0.53 MG/DL (ref 0.55–1.02)
CREAT SERPL-MCNC: 1.17 MG/DL (ref 0.55–1.02)
D DIMER PPP FEU-MCNC: 0.47 MG/L FEU (ref 0–0.65)
EKG ATRIAL RATE: 91 BPM
EKG DIAGNOSIS: NORMAL
EKG P AXIS: 63 DEGREES
EKG P-R INTERVAL: 166 MS
EKG Q-T INTERVAL: 402 MS
EKG QRS DURATION: 90 MS
EKG QTC CALCULATION (BAZETT): 494 MS
EKG R AXIS: 15 DEGREES
EKG T AXIS: 5 DEGREES
EKG VENTRICULAR RATE: 91 BPM
GLOBULIN SER CALC-MCNC: 6.4 G/DL (ref 2–4)
GLUCOSE BLD STRIP.AUTO-MCNC: 113 MG/DL (ref 65–117)
GLUCOSE SERPL-MCNC: 112 MG/DL (ref 65–100)
GLUCOSE SERPL-MCNC: 64 MG/DL (ref 65–100)
PHOSPHATE SERPL-MCNC: 2.2 MG/DL (ref 2.6–4.7)
POTASSIUM SERPL-SCNC: 3 MMOL/L (ref 3.5–5.1)
POTASSIUM SERPL-SCNC: 3.8 MMOL/L (ref 3.5–5.1)
PROT SERPL-MCNC: 9.5 G/DL (ref 6.4–8.2)
SERVICE CMNT-IMP: NORMAL
SERVICE CMNT-IMP: NORMAL
SODIUM SERPL-SCNC: 135 MMOL/L (ref 136–145)
SODIUM SERPL-SCNC: 136 MMOL/L (ref 136–145)
SPECIMEN HOLD: NORMAL
TROPONIN I SERPL HS-MCNC: 17 NG/L (ref 0–51)

## 2023-10-11 PROCEDURE — 2500000003 HC RX 250 WO HCPCS: Performed by: NURSE ANESTHETIST, CERTIFIED REGISTERED

## 2023-10-11 PROCEDURE — 6370000000 HC RX 637 (ALT 250 FOR IP): Performed by: INTERNAL MEDICINE

## 2023-10-11 PROCEDURE — 88305 TISSUE EXAM BY PATHOLOGIST: CPT

## 2023-10-11 PROCEDURE — 6360000002 HC RX W HCPCS

## 2023-10-11 PROCEDURE — 7100000011 HC PHASE II RECOVERY - ADDTL 15 MIN: Performed by: INTERNAL MEDICINE

## 2023-10-11 PROCEDURE — C9113 INJ PANTOPRAZOLE SODIUM, VIA: HCPCS | Performed by: INTERNAL MEDICINE

## 2023-10-11 PROCEDURE — 3600007512: Performed by: INTERNAL MEDICINE

## 2023-10-11 PROCEDURE — 3700000001 HC ADD 15 MINUTES (ANESTHESIA): Performed by: INTERNAL MEDICINE

## 2023-10-11 PROCEDURE — 2060000000 HC ICU INTERMEDIATE R&B

## 2023-10-11 PROCEDURE — 82962 GLUCOSE BLOOD TEST: CPT

## 2023-10-11 PROCEDURE — 2580000003 HC RX 258: Performed by: INTERNAL MEDICINE

## 2023-10-11 PROCEDURE — 80048 BASIC METABOLIC PNL TOTAL CA: CPT

## 2023-10-11 PROCEDURE — 36415 COLL VENOUS BLD VENIPUNCTURE: CPT

## 2023-10-11 PROCEDURE — 94761 N-INVAS EAR/PLS OXIMETRY MLT: CPT

## 2023-10-11 PROCEDURE — 84100 ASSAY OF PHOSPHORUS: CPT

## 2023-10-11 PROCEDURE — 6360000002 HC RX W HCPCS: Performed by: INTERNAL MEDICINE

## 2023-10-11 PROCEDURE — 2700000000 HC OXYGEN THERAPY PER DAY

## 2023-10-11 PROCEDURE — 6360000002 HC RX W HCPCS: Performed by: NURSE ANESTHETIST, CERTIFIED REGISTERED

## 2023-10-11 PROCEDURE — 2709999900 HC NON-CHARGEABLE SUPPLY: Performed by: INTERNAL MEDICINE

## 2023-10-11 PROCEDURE — 7100000010 HC PHASE II RECOVERY - FIRST 15 MIN: Performed by: INTERNAL MEDICINE

## 2023-10-11 PROCEDURE — 3600007502: Performed by: INTERNAL MEDICINE

## 2023-10-11 PROCEDURE — 3700000000 HC ANESTHESIA ATTENDED CARE: Performed by: INTERNAL MEDICINE

## 2023-10-11 PROCEDURE — 0DB68ZX EXCISION OF STOMACH, VIA NATURAL OR ARTIFICIAL OPENING ENDOSCOPIC, DIAGNOSTIC: ICD-10-PCS | Performed by: INTERNAL MEDICINE

## 2023-10-11 PROCEDURE — 2580000003 HC RX 258

## 2023-10-11 PROCEDURE — 93010 ELECTROCARDIOGRAM REPORT: CPT | Performed by: INTERNAL MEDICINE

## 2023-10-11 RX ORDER — PROCHLORPERAZINE EDISYLATE 5 MG/ML
10 INJECTION INTRAMUSCULAR; INTRAVENOUS EVERY 6 HOURS PRN
Status: DISCONTINUED | OUTPATIENT
Start: 2023-10-11 | End: 2023-10-12 | Stop reason: HOSPADM

## 2023-10-11 RX ORDER — LIDOCAINE HYDROCHLORIDE 20 MG/ML
INJECTION, SOLUTION EPIDURAL; INFILTRATION; INTRACAUDAL; PERINEURAL PRN
Status: DISCONTINUED | OUTPATIENT
Start: 2023-10-11 | End: 2023-10-11 | Stop reason: SDUPTHER

## 2023-10-11 RX ORDER — PROPOFOL 10 MG/ML
INJECTION, EMULSION INTRAVENOUS PRN
Status: DISCONTINUED | OUTPATIENT
Start: 2023-10-11 | End: 2023-10-11 | Stop reason: SDUPTHER

## 2023-10-11 RX ORDER — SODIUM CHLORIDE 0.9 % (FLUSH) 0.9 %
5-40 SYRINGE (ML) INJECTION PRN
Status: DISCONTINUED | OUTPATIENT
Start: 2023-10-11 | End: 2023-10-11 | Stop reason: HOSPADM

## 2023-10-11 RX ORDER — SODIUM CHLORIDE, SODIUM LACTATE, POTASSIUM CHLORIDE, CALCIUM CHLORIDE 600; 310; 30; 20 MG/100ML; MG/100ML; MG/100ML; MG/100ML
INJECTION, SOLUTION INTRAVENOUS CONTINUOUS
Status: DISCONTINUED | OUTPATIENT
Start: 2023-10-11 | End: 2023-10-12 | Stop reason: HOSPADM

## 2023-10-11 RX ORDER — FENTANYL CITRATE 50 UG/ML
INJECTION, SOLUTION INTRAMUSCULAR; INTRAVENOUS
Status: DISPENSED
Start: 2023-10-11 | End: 2023-10-12

## 2023-10-11 RX ORDER — FENTANYL CITRATE 50 UG/ML
50 INJECTION, SOLUTION INTRAMUSCULAR; INTRAVENOUS
Status: ACTIVE | OUTPATIENT
Start: 2023-10-11 | End: 2023-10-11

## 2023-10-11 RX ORDER — FENTANYL CITRATE 50 UG/ML
INJECTION, SOLUTION INTRAMUSCULAR; INTRAVENOUS
Status: COMPLETED
Start: 2023-10-11 | End: 2023-10-11

## 2023-10-11 RX ORDER — SUCRALFATE ORAL 1 G/10ML
1 SUSPENSION ORAL EVERY 6 HOURS SCHEDULED
Status: DISCONTINUED | OUTPATIENT
Start: 2023-10-11 | End: 2023-10-12 | Stop reason: HOSPADM

## 2023-10-11 RX ORDER — ONDANSETRON 2 MG/ML
INJECTION INTRAMUSCULAR; INTRAVENOUS
Status: COMPLETED
Start: 2023-10-11 | End: 2023-10-11

## 2023-10-11 RX ORDER — SODIUM CHLORIDE 0.9 % (FLUSH) 0.9 %
5-40 SYRINGE (ML) INJECTION EVERY 12 HOURS SCHEDULED
Status: DISCONTINUED | OUTPATIENT
Start: 2023-10-11 | End: 2023-10-11 | Stop reason: HOSPADM

## 2023-10-11 RX ORDER — SODIUM CHLORIDE 9 MG/ML
25 INJECTION, SOLUTION INTRAVENOUS PRN
Status: DISCONTINUED | OUTPATIENT
Start: 2023-10-11 | End: 2023-10-11 | Stop reason: HOSPADM

## 2023-10-11 RX ORDER — ONDANSETRON 2 MG/ML
4 INJECTION INTRAMUSCULAR; INTRAVENOUS ONCE
Status: COMPLETED | OUTPATIENT
Start: 2023-10-11 | End: 2023-10-11

## 2023-10-11 RX ADMIN — SODIUM CHLORIDE: 9 INJECTION, SOLUTION INTRAVENOUS at 00:59

## 2023-10-11 RX ADMIN — SUCRALFATE 1 G: 1 SUSPENSION ORAL at 23:05

## 2023-10-11 RX ADMIN — SODIUM CHLORIDE, POTASSIUM CHLORIDE, SODIUM LACTATE AND CALCIUM CHLORIDE: 600; 310; 30; 20 INJECTION, SOLUTION INTRAVENOUS at 02:07

## 2023-10-11 RX ADMIN — PANTOPRAZOLE SODIUM 40 MG: 40 INJECTION, POWDER, FOR SOLUTION INTRAVENOUS at 17:33

## 2023-10-11 RX ADMIN — FENTANYL CITRATE 50 MCG: 50 INJECTION, SOLUTION INTRAMUSCULAR; INTRAVENOUS at 14:48

## 2023-10-11 RX ADMIN — ACETAMINOPHEN 650 MG: 325 TABLET ORAL at 06:11

## 2023-10-11 RX ADMIN — PANTOPRAZOLE SODIUM 40 MG: 40 INJECTION, POWDER, FOR SOLUTION INTRAVENOUS at 06:04

## 2023-10-11 RX ADMIN — SODIUM CHLORIDE, PRESERVATIVE FREE 10 ML: 5 INJECTION INTRAVENOUS at 06:04

## 2023-10-11 RX ADMIN — ONDANSETRON 4 MG: 2 INJECTION INTRAMUSCULAR; INTRAVENOUS at 15:36

## 2023-10-11 RX ADMIN — PROPOFOL 100 MG: 10 INJECTION, EMULSION INTRAVENOUS at 15:45

## 2023-10-11 RX ADMIN — LIDOCAINE HYDROCHLORIDE 100 MG: 20 INJECTION, SOLUTION EPIDURAL; INFILTRATION; INTRACAUDAL at 15:45

## 2023-10-11 RX ADMIN — ACETAMINOPHEN 650 MG: 325 TABLET ORAL at 22:05

## 2023-10-11 RX ADMIN — SUCRALFATE 1 G: 1 SUSPENSION ORAL at 17:33

## 2023-10-11 RX ADMIN — WATER 1000 MG: 1 INJECTION INTRAMUSCULAR; INTRAVENOUS; SUBCUTANEOUS at 17:33

## 2023-10-11 RX ADMIN — SODIUM CHLORIDE, PRESERVATIVE FREE 10 ML: 5 INJECTION INTRAVENOUS at 21:20

## 2023-10-11 RX ADMIN — ACETAMINOPHEN 650 MG: 325 TABLET ORAL at 16:43

## 2023-10-11 RX ADMIN — ZOLPIDEM TARTRATE 5 MG: 5 TABLET ORAL at 22:05

## 2023-10-11 RX ADMIN — HYDRALAZINE HYDROCHLORIDE 10 MG: 20 INJECTION INTRAMUSCULAR; INTRAVENOUS at 13:14

## 2023-10-11 RX ADMIN — HYDRALAZINE HYDROCHLORIDE 10 MG: 20 INJECTION INTRAMUSCULAR; INTRAVENOUS at 23:15

## 2023-10-11 ASSESSMENT — PAIN DESCRIPTION - ORIENTATION
ORIENTATION: ANTERIOR;MID
ORIENTATION: ANTERIOR;OUTER
ORIENTATION: ANTERIOR;MID

## 2023-10-11 ASSESSMENT — PAIN DESCRIPTION - DESCRIPTORS
DESCRIPTORS: THROBBING
DESCRIPTORS: THROBBING
DESCRIPTORS: ACHING
DESCRIPTORS: THROBBING

## 2023-10-11 ASSESSMENT — PAIN SCALES - GENERAL
PAINLEVEL_OUTOF10: 5
PAINLEVEL_OUTOF10: 3
PAINLEVEL_OUTOF10: 7
PAINLEVEL_OUTOF10: 5
PAINLEVEL_OUTOF10: 5
PAINLEVEL_OUTOF10: 3
PAINLEVEL_OUTOF10: 4
PAINLEVEL_OUTOF10: 3
PAINLEVEL_OUTOF10: 2
PAINLEVEL_OUTOF10: 4

## 2023-10-11 ASSESSMENT — PAIN DESCRIPTION - LOCATION
LOCATION: HEAD
LOCATION: HAND
LOCATION: HEAD

## 2023-10-11 ASSESSMENT — PAIN DESCRIPTION - ONSET
ONSET: ON-GOING

## 2023-10-11 ASSESSMENT — PAIN DESCRIPTION - FREQUENCY
FREQUENCY: CONTINUOUS

## 2023-10-11 ASSESSMENT — PAIN - FUNCTIONAL ASSESSMENT
PAIN_FUNCTIONAL_ASSESSMENT: ACTIVITIES ARE NOT PREVENTED
PAIN_FUNCTIONAL_ASSESSMENT: ACTIVITIES ARE NOT PREVENTED
PAIN_FUNCTIONAL_ASSESSMENT: 0-10

## 2023-10-11 NOTE — FLOWSHEET NOTE
Patient offers chest pain. Pain woke patient from sleep. Confirms accompanying palpitations and HA. Denies cough, SOB, diff breathing. Of note, patient hypertensive and received IV hydralazine resulting in significant decrease in BP PTA. EKG NSR w/ ST and T wave abnormality. QT is borderline - 402. Zofran held. Compazine for nausea. Trop, CBC, CMP, d-dimer, CXR pending. V - 98.2, 18, 97, 108/63, 100%    ADDENDUM: CXR negative. Potassium 3.0 - will replete. Fluids switched to LR while NPO. Hgb 7.9 - will trend. Trop 17. Dimer 0.14. Nursing to continue to monitor.

## 2023-10-11 NOTE — PROCEDURES
Yuli Loaiza M.D.  [phone]           10/11/2023                EGD Operative Report  Teri Goldman  :  1940  Jethro Coronado Medical Record Number:  [id number]      Indication: melena     : Kirill Benites MD    Assistant -- None  Implants -- None    Referring Provider:  Talita Rodriguez MD      Anesthesia/Sedation:  MAC anesthesia Propofol    Airway assessment: No airway problems anticipated    Pre-Procedural Exam:      Airway: clear, no airway problems anticipated  Heart: RRR, without gallops or rubs  Lungs: clear bilaterally without wheezes, crackles, or rhonchi  Abdomen: soft, nontender, nondistended, bowel sounds present  Mental Status: awake, alert and oriented to person, place and time       Procedure Details     After infomed consent was obtained for the procedure, with all risks and benefits of procedure explained the patient was taken to the endoscopy suite and placed in the left lateral decubitus position. Following sequential administration of sedation as per above, the endoscope was inserted into the mouth and advanced under direct vision to second portion of the duodenum. A careful inspection was made as the gastroscope was withdrawn, including a retroflexed view of the proximal stomach; findings and interventions are described below. Findings:   Esophagus:normal  Stomach: A deep clean based ulcer noted at the antrum. No visible vessel noted on exam. No active bleeding identified  Duodenum/jejunum: normal    Therapies:  biopsy of stomach - from the ulcer egde    Specimens:  as above           Complications:   None; patient tolerated the procedure well. EBL:  None. Impression:    Deep gastric antral ulcer noted.  This is the cause of melena     Recommendations:    -Acid suppression with a proton pump inhibitor.  -Await path  -start Carafate QID  -Monitor labs  -transfuse to keep hgb > 7gms/dl  -diet as tolerated    Kirill Benites

## 2023-10-11 NOTE — PLAN OF CARE
Problem: Neurosensory - Adult  Goal: Achieves stable or improved neurological status  10/11/2023 0245 by Leigh Ann Chawla RN  Outcome: Progressing  10/11/2023 0243 by Leigh Ann Chawla RN  Outcome: Progressing  Goal: Absence of seizures  Outcome: Progressing  Goal: Remains free of injury related to seizures activity  Outcome: Progressing  Goal: Achieves maximal functionality and self care  Outcome: Progressing     Problem: Respiratory - Adult  Goal: Achieves optimal ventilation and oxygenation  Outcome: Progressing     Problem: Cardiovascular - Adult  Goal: Maintains optimal cardiac output and hemodynamic stability  Outcome: Progressing  Goal: Absence of cardiac dysrhythmias or at baseline  Outcome: Progressing     Problem: Skin/Tissue Integrity - Adult  Goal: Skin integrity remains intact  Outcome: Progressing  Goal: Oral mucous membranes remain intact  Outcome: Progressing     Problem: Musculoskeletal - Adult  Goal: Return mobility to safest level of function  Outcome: Progressing  Goal: Maintain proper alignment of affected body part  Outcome: Progressing  Goal: Return ADL status to a safe level of function  Outcome: Progressing     Problem: Gastrointestinal - Adult  Goal: Minimal or absence of nausea and vomiting  Outcome: Progressing  Goal: Maintains or returns to baseline bowel function  Outcome: Progressing  Goal: Maintains adequate nutritional intake  Outcome: Progressing     Problem: Genitourinary - Adult  Goal: Absence of urinary retention  Outcome: Progressing     Problem: Metabolic/Fluid and Electrolytes - Adult  Goal: Electrolytes maintained within normal limits  Outcome: Progressing  Goal: Hemodynamic stability and optimal renal function maintained  Outcome: Progressing     Problem: Hematologic - Adult  Goal: Maintains hematologic stability  Outcome: Progressing

## 2023-10-11 NOTE — PROGRESS NOTES
[facility]  9/6/3044  [id number]    Situation:  Verbal report received from: Betty Villegas  Procedure: Procedure(s):  EGD ESOPHAGOGASTRODUODENOSCOPY  EGD BIOPSY     Background:    Preoperative diagnosis: Gastrointestinal hemorrhage, unspecified gastrointestinal hemorrhage type [K92.2]   Postoperative diagnosis: * No post-op diagnosis entered *     :  Dr. Chanda Skiff  Assistant(s): Circulator: Cristal Gale RN  Endoscopy Technician: Francisco JACQUES     Specimens: * No specimens in log *   H. Pylori  No    Assessment:  Intra-procedure medications     Anesthesia gave intra-procedure sedation and medications, see anesthesia flow sheet Yes    Intravenous fluids: NS@ KVO     Vital signs stable yes    Abdominal assessment: round and soft yes    Recommendation:  Discharge patient per MD order na.   Return to floor yes  Family or Friend na  Permission to share finding with family or friend Yes

## 2023-10-11 NOTE — PROGRESS NOTES
Dr. Leif Aguero discussed with caregiver procedure findings and next steps. Watch returned to patient.

## 2023-10-11 NOTE — PERIOP NOTE
[id number] [phone] Patient has been evaluated by anesthesia pre-procedure. Patient alert and oriented. Vital signs will not be charted by the Endoscopy nurse. All vitals, airway, and loc are monitored by anesthesia staff, Priscila Lopez, throughout procedure. 36 Endoscope was pre-cleaned at bedside immediately following procedure by endo [facility]. 1557   Patient tolerated procedure. Abdomen soft and patient arousable and voices no complaints. Patient transported to endoscopy recovery area. Report given to post procedure RN, Myla Nolasco. 1617 TRANSFER - OUT REPORT:    Verbal report given to Lauren Candelario on Clark Poag  being transferred to [facility] for routine post-op       Report consisted of patient's Situation, Background, Assessment and   Recommendations(SBAR). Information from the following report(s) Nurse Handoff Report, MAR, Recent Results, and Cardiac Rhythm Sinus Tachycardia  was reviewed with the receiving nurse. Lines:   Peripheral IV 10/11/23 Right;Posterior Wrist (Active)   Site Assessment Clean, dry & intact 10/11/23 1605   Line Status Capped 10/11/23 [facility] Connections checked and tightened 10/11/23 1605   Phlebitis Assessment No symptoms 10/11/23 1605   Infiltration Assessment 0 10/11/23 1605   Alcohol Cap Used Yes 10/11/23 1605   Dressing Status Clean, dry & intact 10/11/23 1605   Dressing Type Transparent 10/11/23 1605   Dressing Intervention New 10/11/23 0400        Opportunity for questions and clarification was provided.       Patient transported with:  Portable Monitor and Tech

## 2023-10-11 NOTE — CARE COORDINATION
10/11/23 1157   Service Assessment   Patient Orientation Alert and Oriented   Cognition Alert   History Provided By Patient   Primary [facility]   Patient's Healthcare Decision Maker is: Legal Next of Kin   PCP Verified by CM Yes   Last Visit to PCP Within last 3 months   Prior Functional Level Independent in ADLs/IADLs   Current Functional Level Independent in ADLs/IADLs   Can patient return to prior living arrangement Yes   Family able to assist with home care needs: No   Would you like for me to discuss the discharge plan with any other family members/significant others, and if so, who? No   Financial Resources None   [employer] None     10/11/2023  12:10 PM  Case management note    Patient came to hospital for hypokalemia. Patient has history of melena, anemia, HTT and diverticulitis. Patient lives alone, drives and independent with ADL's. Patient son to provide transportation on discharge.   [facility]  AD, not on file    [employer]

## 2023-10-11 NOTE — PROGRESS NOTES
1613 received report on patient from EGD procedure from [facility]. [phone] spoke to [facility] regarding patient diet order, she stated that per MD she can have reg diet.

## 2023-10-11 NOTE — ED NOTES
Report given to James Eid RN with SBAR, [facility], lab results and report summary.      Camilla Awan RN  10/10/23 2519

## 2023-10-12 VITALS
WEIGHT: 143.6 LBS | DIASTOLIC BLOOD PRESSURE: 59 MMHG | HEIGHT: 63 IN | OXYGEN SATURATION: 98 % | HEART RATE: 106 BPM | TEMPERATURE: 98.2 F | SYSTOLIC BLOOD PRESSURE: 126 MMHG | BODY MASS INDEX: 25.45 KG/M2 | RESPIRATION RATE: 18 BRPM

## 2023-10-12 LAB
ANION GAP SERPL CALC-SCNC: 6 MMOL/L (ref 5–15)
BASOPHILS # BLD: 0 K/UL (ref 0–0.1)
BASOPHILS NFR BLD: 1 % (ref 0–1)
BUN SERPL-MCNC: 13 MG/DL (ref 6–20)
BUN/CREAT SERPL: 11 (ref 12–20)
CALCIUM SERPL-MCNC: 8.2 MG/DL (ref 8.5–10.1)
CHLORIDE SERPL-SCNC: 107 MMOL/L (ref 97–108)
CO2 SERPL-SCNC: 24 MMOL/L (ref 21–32)
CREAT SERPL-MCNC: 1.15 MG/DL (ref 0.55–1.02)
DIFFERENTIAL METHOD BLD: ABNORMAL
EOSINOPHIL # BLD: 0.2 K/UL (ref 0–0.4)
EOSINOPHIL NFR BLD: 3 % (ref 0–7)
ERYTHROCYTE [DISTWIDTH] IN BLOOD BY AUTOMATED COUNT: 15.6 % (ref 11.5–14.5)
GLUCOSE SERPL-MCNC: 106 MG/DL (ref 65–100)
HCT VFR BLD AUTO: 22.7 % (ref 35–47)
HGB BLD-MCNC: 7.4 G/DL (ref 11.5–16)
IMM GRANULOCYTES # BLD AUTO: 0 K/UL (ref 0–0.04)
IMM GRANULOCYTES NFR BLD AUTO: 1 % (ref 0–0.5)
LYMPHOCYTES # BLD: 2.1 K/UL (ref 0.8–3.5)
LYMPHOCYTES NFR BLD: 35 % (ref 12–49)
MAGNESIUM SERPL-MCNC: 1.8 MG/DL (ref 1.6–2.4)
MCH RBC QN AUTO: 31.4 PG (ref 26–34)
MCHC RBC AUTO-ENTMCNC: 32.6 G/DL (ref 30–36.5)
MCV RBC AUTO: 96.2 FL (ref 80–99)
MONOCYTES # BLD: 0.5 K/UL (ref 0–1)
MONOCYTES NFR BLD: 8 % (ref 5–13)
NEUTS SEG # BLD: 3.3 K/UL (ref 1.8–8)
NEUTS SEG NFR BLD: 52 % (ref 32–75)
NRBC # BLD: 0 K/UL (ref 0–0.01)
NRBC BLD-RTO: 0 PER 100 WBC
PHOSPHATE SERPL-MCNC: 2.8 MG/DL (ref 2.6–4.7)
PLATELET # BLD AUTO: 175 K/UL (ref 150–400)
PMV BLD AUTO: 11.2 FL (ref 8.9–12.9)
POTASSIUM SERPL-SCNC: 3.3 MMOL/L (ref 3.5–5.1)
RBC # BLD AUTO: 2.36 M/UL (ref 3.8–5.2)
SODIUM SERPL-SCNC: 137 MMOL/L (ref 136–145)
WBC # BLD AUTO: 6.1 K/UL (ref 3.6–11)

## 2023-10-12 PROCEDURE — 6370000000 HC RX 637 (ALT 250 FOR IP): Performed by: INTERNAL MEDICINE

## 2023-10-12 PROCEDURE — 94761 N-INVAS EAR/PLS OXIMETRY MLT: CPT

## 2023-10-12 PROCEDURE — 6360000002 HC RX W HCPCS: Performed by: INTERNAL MEDICINE

## 2023-10-12 PROCEDURE — 36415 COLL VENOUS BLD VENIPUNCTURE: CPT

## 2023-10-12 PROCEDURE — C9113 INJ PANTOPRAZOLE SODIUM, VIA: HCPCS | Performed by: INTERNAL MEDICINE

## 2023-10-12 PROCEDURE — 85025 COMPLETE CBC W/AUTO DIFF WBC: CPT

## 2023-10-12 PROCEDURE — 83735 ASSAY OF MAGNESIUM: CPT

## 2023-10-12 PROCEDURE — 2580000003 HC RX 258: Performed by: INTERNAL MEDICINE

## 2023-10-12 PROCEDURE — 80048 BASIC METABOLIC PNL TOTAL CA: CPT

## 2023-10-12 PROCEDURE — 84100 ASSAY OF PHOSPHORUS: CPT

## 2023-10-12 PROCEDURE — 6360000002 HC RX W HCPCS

## 2023-10-12 RX ORDER — PANTOPRAZOLE SODIUM 40 MG/1
40 TABLET, DELAYED RELEASE ORAL 2 TIMES DAILY
Qty: 60 TABLET | Refills: 1 | Status: SHIPPED | OUTPATIENT
Start: 2023-10-12

## 2023-10-12 RX ORDER — POTASSIUM CHLORIDE 750 MG/1
40 TABLET, FILM COATED, EXTENDED RELEASE ORAL ONCE
Status: COMPLETED | OUTPATIENT
Start: 2023-10-12 | End: 2023-10-12

## 2023-10-12 RX ORDER — AMLODIPINE BESYLATE 5 MG/1
10 TABLET ORAL DAILY
Status: DISCONTINUED | OUTPATIENT
Start: 2023-10-12 | End: 2023-10-12

## 2023-10-12 RX ORDER — METOPROLOL SUCCINATE 50 MG/1
50 TABLET, EXTENDED RELEASE ORAL DAILY
Status: DISCONTINUED | OUTPATIENT
Start: 2023-10-12 | End: 2023-10-12 | Stop reason: HOSPADM

## 2023-10-12 RX ORDER — AMLODIPINE BESYLATE 5 MG/1
10 TABLET ORAL DAILY
Status: DISCONTINUED | OUTPATIENT
Start: 2023-10-12 | End: 2023-10-12 | Stop reason: HOSPADM

## 2023-10-12 RX ORDER — SUCRALFATE ORAL 1 G/10ML
1 SUSPENSION ORAL
Qty: 1200 ML | Refills: 3 | Status: SHIPPED | OUTPATIENT
Start: 2023-10-12

## 2023-10-12 RX ADMIN — POTASSIUM CHLORIDE 40 MEQ: 750 TABLET, FILM COATED, EXTENDED RELEASE ORAL at 09:07

## 2023-10-12 RX ADMIN — SUCRALFATE 1 G: 1 SUSPENSION ORAL at 12:06

## 2023-10-12 RX ADMIN — PANTOPRAZOLE SODIUM 40 MG: 40 INJECTION, POWDER, FOR SOLUTION INTRAVENOUS at 05:47

## 2023-10-12 RX ADMIN — SUCRALFATE 1 G: 1 SUSPENSION ORAL at 05:47

## 2023-10-12 RX ADMIN — SODIUM CHLORIDE, PRESERVATIVE FREE 10 ML: 5 INJECTION INTRAVENOUS at 09:08

## 2023-10-12 RX ADMIN — ACETAMINOPHEN 650 MG: 325 TABLET ORAL at 06:03

## 2023-10-12 RX ADMIN — HYDRALAZINE HYDROCHLORIDE 10 MG: 20 INJECTION INTRAMUSCULAR; INTRAVENOUS at 09:08

## 2023-10-12 RX ADMIN — DULOXETINE HYDROCHLORIDE 30 MG: 30 CAPSULE, DELAYED RELEASE ORAL at 09:07

## 2023-10-12 ASSESSMENT — PAIN SCALES - GENERAL: PAINLEVEL_OUTOF10: 2

## 2023-10-12 ASSESSMENT — PAIN DESCRIPTION - LOCATION: LOCATION: HEAD

## 2023-10-12 NOTE — DISCHARGE SUMMARY
Hospitalist Discharge Summary     Patient ID:    Phan Khan  [id number]  3 y.o.  1940    Admit date of service: 10/9/2023    Discharge date of service: 10/12/2023    Admission Diagnoses: Hypokalemia [E87.6]  Hyponatremia [E87.1]  GI bleed [K92.2]  Acute cystitis without hematuria [N30.00]  Gastrointestinal hemorrhage with melena [K92.1]    Chronic Diagnoses:      Discharge Medications:   Current Discharge Medication List        START taking these medications    Details   pantoprazole (PROTONIX) 40 MG tablet Take 1 tablet by mouth in the morning and at bedtime  Qty: 60 tablet, Refills: 1      sucralfate (CARAFATE) 1 GM/10ML suspension Take 10 mLs by mouth 4 times daily (before meals and nightly)  Qty: 1200 mL, Refills: 3           CONTINUE these medications which have NOT CHANGED    Details   estradiol (ESTRACE) 0.1 MG/GM vaginal cream APPLY 1/2 GRAM INTO IN THE VAGINA 3 DAYS A WEEK AS DIRECTED      amLODIPine (NORVASC) 10 MG tablet Take by mouth daily      cyanocobalamin 1000 MCG/ML injection Inject into the skin      DULoxetine (CYMBALTA) 30 MG extended release capsule TAKE 1 CAPSULE BY MOUTH EVERY MORNING      vitamin E 1000 units capsule Take by mouth daily      zolpidem (AMBIEN) 10 MG tablet Take by mouth daily. STOP taking these medications       omeprazole (PRILOSEC) 40 MG delayed release capsule Comments:   Reason for Stopping:         AMLODIPINE-ATORVASTATIN PO Comments:   Reason for Stopping:         metoprolol succinate (TOPROL XL) 100 MG extended release tablet Comments:   Reason for Stopping:         metoprolol succinate (TOPROL XL) 50 MG extended release tablet Comments:   Reason for Stopping: Follow up Care:    1. No follow-up provider specified. in 1-2 weeks  2. Gastroenterology     Diet:  regular diet    Disposition:  Home. Advanced Directive:    Discharge Exam:  See today's note.     CONSULTATIONS: GI    Significant Diagnostic Studies:   Recent Labs

## 2023-10-12 NOTE — CARE COORDINATION
CM Note:  Pt to d/c home today transported by her son. No CM needs. She is to follow up OP.   JOSE Otoole

## 2023-10-12 NOTE — DISCHARGE INSTRUCTIONS
ACUTE DIAGNOSES:  Hypokalemia [E87.6]  Hyponatremia [E87.1]  GI bleed [K92.2]  Acute cystitis without hematuria [N30.00]  Gastrointestinal hemorrhage with melena [K92.1]    CHRONIC MEDICAL DIAGNOSES:  [unfilled]    DISCHARGE MEDICATIONS:   [unfilled]    It is important that you take the medication exactly as they are prescribed. Keep your medication in the bottles provided by the pharmacist and keep a list of the medication names, dosages, and times to be taken in your wallet. Do not take other medications without consulting your doctor. DIET:  regular diet    ACTIVITY: activity as tolerated    ADDITIONAL INFORMATION: If you experience any of the following symptoms then please call your primary care physician or return to the emergency room if you cannot get hold of your doctor: Fever, chills, nausea, vomiting, diarrhea, change in mentation, falling, bleeding, shortness of breath. FOLLOW UP CARE:   @[facility]@  you are to call and set up an appointment to see them in 5 days. Follow-up  Dr Luz Maria Velasquez gastroenterology in 2-3 weeks. [address] obtained by :  I understand that if any problems occur once I am at home I am to contact my physician. I understand and acknowledge receipt of the instructions indicated above.                                                                                                                                            Physician's or R.N.'s Signature                                                                  Date/Time                                                                                                                                              Patient or Representative Signature                                                          Date/Time

## 2023-10-12 NOTE — PROGRESS NOTES
Discharge instructions, including information on new medications, were all reviewed with patient. All questions were answered. IV and Telemetry monitor were removed and Care Plans and Education were completed. Patient will be discharged home with her daughter-in-law. Primary nurse updated.

## 2023-10-12 NOTE — ANESTHESIA POSTPROCEDURE EVALUATION
Department of Anesthesiology  Postprocedure Note    Patient: Sindhu Cox  MRN: [id number]  YOB: 1940  Date of evaluation: 10/12/2023      Procedure Summary     Date: 10/11/23 Room / Location: [facility] ENDO 03 / [facility] ENDOSCOPY    Anesthesia Start: 1542 Anesthesia Stop: 1558    Procedures:       EGD ESOPHAGOGASTRODUODENOSCOPY (Upper GI Region)      EGD BIOPSY (Upper GI Region) Diagnosis:       Gastrointestinal hemorrhage, unspecified gastrointestinal hemorrhage type      (Gastrointestinal hemorrhage, unspecified gastrointestinal hemorrhage type [K92.2])    Surgeons: Jose Murray MD Responsible Provider: Sunitha Perry MD    Anesthesia Type: MAC ASA Status: 2          Anesthesia Type: No value filed.     Oanh Phase I: Oanh Score: 9    Oanh Phase II: Oanh Score: 10      Anesthesia Post Evaluation    Patient location during evaluation: PACU  Patient participation: complete - patient participated  Level of consciousness: awake  Pain score: 0  Airway patency: patent  Nausea & Vomiting: no nausea and no vomiting  Complications: no  Cardiovascular status: blood pressure returned to baseline  Respiratory status: acceptable  Hydration status: euvolemic  Pain management: adequate

## 2023-10-12 NOTE — PROGRESS NOTES
Physician Progress Note      PATIENT:               Vasquez Soares  CSN #:                  [id number]  :                       1940  ADMIT DATE:       10/9/2023 1:28 PM  [facility] DATE:  RESPONDING  PROVIDER #: Lincoln Hoover MD          QUERY TEXT:    Pt admitted with GI Bleed and has anemia documented in Hospitalist PN, 10/10. If possible, please document in progress notes and discharge summary further   specificity regarding the acuity and type of anemia:    The medical record reflects the following:  Risk Factors: GI Bleed, EGD noted with Deep gastric antral ulcer    Clinical Indicators: Hospitalist PN, 10/10 \"Baseline mild anemia; Acute GI   bleed, POA, Acute GI bleed/with bright red blood per rectum, Hemoglobin   slightly dropped, will trend; Trend H&H\". HB - 8.9, 8.4, 7.4, 7.5, 7.6, 8.4, 7.9  HCT - 26.7, 25.0, 23.1, 22.1, 23.2, 25.6, 23.9    Treatment: Serial labs, IVF , EGD, Biopsy of the Ulcer/Patho, Transfuse if hgb  Options provided:  -- Anemia due to acute blood loss  -- Anemia due to acute on chronic blood loss  -- Other - I will add my own diagnosis  -- Disagree - Not applicable / Not valid  -- Disagree - Clinically unable to determine / Unknown  -- Refer to Clinical Documentation Reviewer    PROVIDER RESPONSE TEXT:    This patient has acute blood loss anemia.     Query created by: Giovani Lemus on 10/12/2023 11:44 AM      Electronically signed by:  Lincoln Hoover MD 10/12/2023 12:19 PM

## 2023-10-13 LAB
ABO + RH BLD: NORMAL
BLD PROD TYP BPU: NORMAL
BLD PROD TYP BPU: NORMAL
BLOOD BANK CMNT PATIENT-IMP: NORMAL
BLOOD BANK DISPENSE STATUS: NORMAL
BLOOD BANK DISPENSE STATUS: NORMAL
BLOOD GROUP ANTIBODIES SERPL: NORMAL
BPU ID: NORMAL
BPU ID: NORMAL
CROSSMATCH RESULT: NORMAL
CROSSMATCH RESULT: NORMAL
DAT POLY-SP REAG RBC QL: NORMAL
SPECIMEN EXP DATE BLD: NORMAL
UNIT DIVISION: 0
UNIT DIVISION: 0

## 2024-01-24 NOTE — PROGRESS NOTES
[facility]  Medical Oncology at [place]  [phone]    Hematology / Oncology Established Visit    Reason for Visit:   Maya Cantu is a 83 y.o. female who is seen for multiple myeloma.     Hematology Oncology Treatment History:     Diagnosis: Multiple myeloma, smoldering     Stage: N/A     Pathology:   - 2/4/1998 Bone marrow biopsy: Normocellular marrow, no pathologic diagnosis. (No evidence of plasmacytosis).  - 8/6/18 Bone marrow biopsy: Variably hypercellular marrow with monoclonal kappa restricted plasmacytosis, 40-50%. Trilineage hematopoiesis.   Comment: The bone marrow is variably hypercellular for age ranging 20% to 70%. Monoclonal, kappa restricted plasmacytosis (40-50%) is identified. No atypical lymphocytic infiltrates are present. Trilineage hematopoiesis with maturation is present in the background. Clinical history indicates MGUS in 1990s with negative bone marrow in 1998 without plasmacytosis. Laboratory data indicate monoclonal IgG kappa of 3.6 g/dL with an increase in serum free kappa/lambda ratio of 37.9 (8/2/2018).   Overall findings may represent asymptomatic (smoldering) plasma cell myeloma in the absence of myeloma-defining events. Correlation with clinical, laboratory and radiologic findings including MRI (> 1 focal lesion) is advised to determine evidence of end organ damage.  Cytogenetics: 46, XX[20], normal  FISH: Del(13q), IGH rearrangement, t(14;20) detected     Prior Treatment: None     Current Treatment: Observation    History of Present Illness:   Maya Cantu is a 83 y.o. female who comes in to re-establish care after not being seen for over 3 years. She has smoldering myeloma. She has previously been seen at [facility], [facility] and other providers. Had a h/o MGUS in 1990. 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 Normocytic anemia in 2017 resolved. She also had B12 deficiency requiring injections.

## 2024-01-25 ENCOUNTER — OFFICE VISIT (OUTPATIENT)
Age: 84
End: 2024-01-25
Payer: MEDICARE

## 2024-01-25 VITALS
SYSTOLIC BLOOD PRESSURE: 109 MMHG | TEMPERATURE: 98.3 F | OXYGEN SATURATION: 98 % | HEIGHT: 63 IN | DIASTOLIC BLOOD PRESSURE: 67 MMHG | HEART RATE: 60 BPM | BODY MASS INDEX: 24.8 KG/M2 | RESPIRATION RATE: 18 BRPM | WEIGHT: 140 LBS

## 2024-01-25 DIAGNOSIS — R53.83 OTHER FATIGUE: ICD-10-CM

## 2024-01-25 DIAGNOSIS — D64.9 NORMOCYTIC ANEMIA, NOT DUE TO BLOOD LOSS: ICD-10-CM

## 2024-01-25 DIAGNOSIS — N28.9 RENAL INSUFFICIENCY: ICD-10-CM

## 2024-01-25 DIAGNOSIS — C90.00 MULTIPLE MYELOMA NOT HAVING ACHIEVED REMISSION (HCC): ICD-10-CM

## 2024-01-25 DIAGNOSIS — C90.00 MULTIPLE MYELOMA NOT HAVING ACHIEVED REMISSION (HCC): Primary | ICD-10-CM

## 2024-01-25 LAB
ALBUMIN SERPL-MCNC: 3.5 G/DL (ref 3.5–5)
ALBUMIN/GLOB SERPL: 0.6 (ref 1.1–2.2)
ALP SERPL-CCNC: 67 U/L (ref 45–117)
ALT SERPL-CCNC: 16 U/L (ref 12–78)
ANION GAP SERPL CALC-SCNC: 5 MMOL/L (ref 5–15)
AST SERPL-CCNC: 14 U/L (ref 15–37)
BILIRUB SERPL-MCNC: 0.4 MG/DL (ref 0.2–1)
BUN SERPL-MCNC: 16 MG/DL (ref 6–20)
BUN/CREAT SERPL: 10 (ref 12–20)
CALCIUM SERPL-MCNC: 8.7 MG/DL (ref 8.5–10.1)
CHLORIDE SERPL-SCNC: 103 MMOL/L (ref 97–108)
CREAT SERPL-MCNC: 1.53 MG/DL (ref 0.55–1.02)
FERRITIN SERPL-MCNC: 64 NG/ML (ref 8–252)
FOLATE SERPL-MCNC: 19.7 NG/ML (ref 5–21)
GLOBULIN SER CALC-MCNC: 6.3 G/DL (ref 2–4)
GLUCOSE SERPL-MCNC: 87 MG/DL (ref 65–100)
IRON SATN MFR SERPL: 28 % (ref 20–50)
IRON SERPL-MCNC: 99 UG/DL (ref 35–150)
POTASSIUM SERPL-SCNC: 3.6 MMOL/L (ref 3.5–5.1)
PROT SERPL-MCNC: 9.8 G/DL (ref 6.4–8.2)
SODIUM SERPL-SCNC: 135 MMOL/L (ref 136–145)
TIBC SERPL-MCNC: 352 UG/DL (ref 250–450)
VIT B12 SERPL-MCNC: 643 PG/ML (ref 193–986)

## 2024-01-25 PROCEDURE — 1036F TOBACCO NON-USER: CPT | Performed by: INTERNAL MEDICINE

## 2024-01-25 PROCEDURE — 3078F DIAST BP <80 MM HG: CPT | Performed by: INTERNAL MEDICINE

## 2024-01-25 PROCEDURE — 99214 OFFICE O/P EST MOD 30 MIN: CPT | Performed by: INTERNAL MEDICINE

## 2024-01-25 RX ORDER — IBUPROFEN 200 MG
200 TABLET ORAL
COMMUNITY

## 2024-01-25 RX ORDER — FOLIC ACID 1 MG/1
1000 TABLET ORAL DAILY
COMMUNITY
Start: 2024-01-19

## 2024-01-25 ASSESSMENT — PATIENT HEALTH QUESTIONNAIRE - PHQ9
SUM OF ALL RESPONSES TO PHQ QUESTIONS 1-9: 0
SUM OF ALL RESPONSES TO PHQ QUESTIONS 1-9: 0
1. LITTLE INTEREST OR PLEASURE IN DOING THINGS: 0
SUM OF ALL RESPONSES TO PHQ QUESTIONS 1-9: 0
2. FEELING DOWN, DEPRESSED OR HOPELESS: 0
SUM OF ALL RESPONSES TO PHQ QUESTIONS 1-9: 0
SUM OF ALL RESPONSES TO PHQ9 QUESTIONS 1 & 2: 0

## 2024-01-25 NOTE — PROGRESS NOTES
Chief Complaint   Patient presents with    Follow-up           Vitals:    01/25/24 1248   BP: (!) 126/59   Pulse: (!) 106   Resp: 18   Temp: 98.2 °F (36.8 °C)   SpO2: 98%            1. Have you been to the ER, urgent care clinic since your last visit?  Hospitalized since your last visit?  Yes  [place], Oct 2023 Ulcer  2. Have you seen or consulted any other health care providers outside of the [facility] since your last visit?  Include any pap smears or colon screening. Yes Urology, Dr Murguia

## 2024-01-26 ENCOUNTER — TELEPHONE (OUTPATIENT)
Age: 84
End: 2024-01-26

## 2024-01-26 DIAGNOSIS — N18.9 CHRONIC KIDNEY DISEASE, UNSPECIFIED CKD STAGE: Primary | ICD-10-CM

## 2024-01-26 LAB
BASOPHILS # BLD: 0.1 K/UL (ref 0–0.1)
BASOPHILS NFR BLD: 1 % (ref 0–1)
DIFFERENTIAL METHOD BLD: ABNORMAL
EOSINOPHIL # BLD: 0.2 K/UL (ref 0–0.4)
EOSINOPHIL NFR BLD: 3 % (ref 0–7)
ERYTHROCYTE [DISTWIDTH] IN BLOOD BY AUTOMATED COUNT: 15.6 % (ref 11.5–14.5)
HCT VFR BLD AUTO: 32.7 % (ref 35–47)
HGB BLD-MCNC: 11 G/DL (ref 11.5–16)
IMM GRANULOCYTES # BLD AUTO: 0 K/UL (ref 0–0.04)
IMM GRANULOCYTES NFR BLD AUTO: 0 % (ref 0–0.5)
LYMPHOCYTES # BLD: 2.1 K/UL (ref 0.8–3.5)
LYMPHOCYTES NFR BLD: 34 % (ref 12–49)
MCH RBC QN AUTO: 31.7 PG (ref 26–34)
MCHC RBC AUTO-ENTMCNC: 33.6 G/DL (ref 30–36.5)
MCV RBC AUTO: 94.2 FL (ref 80–99)
MONOCYTES # BLD: 0.4 K/UL (ref 0–1)
MONOCYTES NFR BLD: 7 % (ref 5–13)
NEUTS SEG # BLD: 3.4 K/UL (ref 1.8–8)
NEUTS SEG NFR BLD: 55 % (ref 32–75)
NRBC # BLD: 0 K/UL (ref 0–0.01)
NRBC BLD-RTO: 0 PER 100 WBC
PLATELET # BLD AUTO: 198 K/UL (ref 150–400)
PMV BLD AUTO: 11.6 FL (ref 8.9–12.9)
RBC # BLD AUTO: 3.47 M/UL (ref 3.8–5.2)
WBC # BLD AUTO: 6.2 K/UL (ref 3.6–11)

## 2024-01-26 NOTE — TELEPHONE ENCOUNTER
01/26/24 12:02 PM Called pt and reviewed labs. No iron, b12 or folate deficiency. No need to restart B12. Anemia is improving. Discussed creatinine is worse and I recommend nephrology evaluation. Referral placed. I also discussed I will call her next week to review myeloma tumor markers once they result. Encouraged good oral hydration with water. She verbalized understanding.

## 2024-02-07 ENCOUNTER — TELEPHONE (OUTPATIENT)
Age: 84
End: 2024-02-07

## 2024-02-07 LAB
ALBUMIN SERPL ELPH-MCNC: 3.8 G/DL (ref 2.9–4.4)
ALBUMIN/GLOB SERPL: 0.7 (ref 0.7–1.7)
ALPHA1 GLOB SERPL ELPH-MCNC: 0.3 G/DL (ref 0–0.4)
ALPHA2 GLOB SERPL ELPH-MCNC: 0.8 G/DL (ref 0.4–1)
B-GLOBULIN SERPL ELPH-MCNC: 0.9 G/DL (ref 0.7–1.3)
GAMMA GLOB SERPL ELPH-MCNC: 3.6 G/DL (ref 0.4–1.8)
GLOBULIN SER-MCNC: 5.5 G/DL (ref 2.2–3.9)
IGA SERPL-MCNC: 9 MG/DL (ref 64–422)
IGG SERPL-MCNC: 4574 MG/DL (ref 586–1602)
IGM SERPL-MCNC: 9 MG/DL (ref 26–217)
INTERPRETATION SERPL IEP-IMP: ABNORMAL
KAPPA LC FREE SER-MCNC: 368.9 MG/L (ref 3.3–19.4)
KAPPA LC FREE/LAMBDA FREE SER: 47.29 (ref 0.26–1.65)
LAMBDA LC FREE SERPL-MCNC: 7.8 MG/L (ref 5.7–26.3)
M PROTEIN SERPL ELPH-MCNC: 1.7 G/DL
PROT SERPL-MCNC: 9.3 G/DL (ref 6–8.5)

## 2024-02-07 NOTE — TELEPHONE ENCOUNTER
02/07/24 11:11 AM Called pt and reviewed myeloma tumor markers have improved from prior. Will continue monitoring closely. She verbalized understanding.     9/25/23 SPEP/BERHANE IgG kappa M-spike 3.7, , LLC 7.4, k/l ratio 50.59, WBC 4.8, Hgb 10.8, , BUN 16, Cr 1.25, Ca 9.4    1/25/24 SPEP/BERHANE biclonal IgG kappa M-spike 1.7 and 1.3, .9, LLC 7.8, K/L ratio 47, IgG [id number]    Yessica Garsia, NP

## 2024-04-08 DIAGNOSIS — C90.00 MULTIPLE MYELOMA NOT HAVING ACHIEVED REMISSION (HCC): ICD-10-CM

## 2024-04-16 LAB
ALBUMIN SERPL-MCNC: 3.4 G/DL (ref 3.5–5)
ALBUMIN/GLOB SERPL: 0.5 (ref 1.1–2.2)
ALP SERPL-CCNC: 63 U/L (ref 45–117)
ALT SERPL-CCNC: 17 U/L (ref 12–78)
ANION GAP SERPL CALC-SCNC: 2 MMOL/L (ref 5–15)
AST SERPL-CCNC: 16 U/L (ref 15–37)
BASOPHILS # BLD: 0 K/UL (ref 0–0.1)
BASOPHILS NFR BLD: 1 % (ref 0–1)
BILIRUB SERPL-MCNC: 0.4 MG/DL (ref 0.2–1)
BUN SERPL-MCNC: 13 MG/DL (ref 6–20)
BUN/CREAT SERPL: 10 (ref 12–20)
CALCIUM SERPL-MCNC: 9.1 MG/DL (ref 8.5–10.1)
CHLORIDE SERPL-SCNC: 106 MMOL/L (ref 97–108)
CO2 SERPL-SCNC: 27 MMOL/L (ref 21–32)
CREAT SERPL-MCNC: 1.28 MG/DL (ref 0.55–1.02)
DIFFERENTIAL METHOD BLD: ABNORMAL
EOSINOPHIL # BLD: 0.2 K/UL (ref 0–0.4)
EOSINOPHIL NFR BLD: 4 % (ref 0–7)
ERYTHROCYTE [DISTWIDTH] IN BLOOD BY AUTOMATED COUNT: 15 % (ref 11.5–14.5)
GLOBULIN SER CALC-MCNC: 6.6 G/DL (ref 2–4)
GLUCOSE SERPL-MCNC: 97 MG/DL (ref 65–100)
HCT VFR BLD AUTO: 33.9 % (ref 35–47)
HGB BLD-MCNC: 11.6 G/DL (ref 11.5–16)
IMM GRANULOCYTES # BLD AUTO: 0 K/UL (ref 0–0.04)
IMM GRANULOCYTES NFR BLD AUTO: 0 % (ref 0–0.5)
LYMPHOCYTES # BLD: 2.1 K/UL (ref 0.8–3.5)
LYMPHOCYTES NFR BLD: 46 % (ref 12–49)
MCH RBC QN AUTO: 33.1 PG (ref 26–34)
MCHC RBC AUTO-ENTMCNC: 34.2 G/DL (ref 30–36.5)
MCV RBC AUTO: 96.9 FL (ref 80–99)
MONOCYTES # BLD: 0.2 K/UL (ref 0–1)
MONOCYTES NFR BLD: 5 % (ref 5–13)
NEUTS SEG # BLD: 2 K/UL (ref 1.8–8)
NEUTS SEG NFR BLD: 44 % (ref 32–75)
NRBC # BLD: 0 K/UL (ref 0–0.01)
NRBC BLD-RTO: 0 PER 100 WBC
PLATELET # BLD AUTO: 178 K/UL (ref 150–400)
PMV BLD AUTO: 11.3 FL (ref 8.9–12.9)
POTASSIUM SERPL-SCNC: 4 MMOL/L (ref 3.5–5.1)
PROT SERPL-MCNC: 10 G/DL (ref 6.4–8.2)
RBC # BLD AUTO: 3.5 M/UL (ref 3.8–5.2)
SODIUM SERPL-SCNC: 135 MMOL/L (ref 136–145)
WBC # BLD AUTO: 4.5 K/UL (ref 3.6–11)

## 2024-04-21 LAB
ALBUMIN SERPL ELPH-MCNC: 3.7 G/DL (ref 2.9–4.4)
ALBUMIN/GLOB SERPL: 0.7 (ref 0.7–1.7)
ALPHA1 GLOB SERPL ELPH-MCNC: 0.3 G/DL (ref 0–0.4)
ALPHA2 GLOB SERPL ELPH-MCNC: 0.9 G/DL (ref 0.4–1)
B-GLOBULIN SERPL ELPH-MCNC: 1.1 G/DL (ref 0.7–1.3)
GAMMA GLOB SERPL ELPH-MCNC: 3.9 G/DL (ref 0.4–1.8)
GLOBULIN SER-MCNC: 6.1 G/DL (ref 2.2–3.9)
IGA SERPL-MCNC: 10 MG/DL (ref 64–422)
IGG SERPL-MCNC: 4906 MG/DL (ref 586–1602)
IGM SERPL-MCNC: 10 MG/DL (ref 26–217)
INTERPRETATION SERPL IEP-IMP: ABNORMAL
KAPPA LC FREE SER-MCNC: 358.9 MG/L (ref 3.3–19.4)
KAPPA LC FREE/LAMBDA FREE SER: 37.78 (ref 0.26–1.65)
LAMBDA LC FREE SERPL-MCNC: 9.5 MG/L (ref 5.7–26.3)
M PROTEIN SERPL ELPH-MCNC: 3.7 G/DL
PROT SERPL-MCNC: 9.8 G/DL (ref 6–8.5)

## 2024-04-23 NOTE — PROGRESS NOTES
[facility]  Medical Oncology at [place]  [phone]    Hematology / Oncology Established Visit    Reason for Visit:   Maya Cantu is a 84 y.o. female who is seen for multiple myeloma.     Hematology Oncology Treatment History:     Diagnosis: Multiple myeloma, smoldering     Stage: N/A     Pathology:   - 2/4/1998 Bone marrow biopsy: Normocellular marrow, no pathologic diagnosis. (No evidence of plasmacytosis).  - 8/6/18 Bone marrow biopsy: Variably hypercellular marrow with monoclonal kappa restricted plasmacytosis, 40-50%. Trilineage hematopoiesis.   Comment: The bone marrow is variably hypercellular for age ranging 20% to 70%. Monoclonal, kappa restricted plasmacytosis (40-50%) is identified. No atypical lymphocytic infiltrates are present. Trilineage hematopoiesis with maturation is present in the background. Clinical history indicates MGUS in 1990s with negative bone marrow in 1998 without plasmacytosis. Laboratory data indicate monoclonal IgG kappa of 3.6 g/dL with an increase in serum free kappa/lambda ratio of 37.9 (8/2/2018).   Overall findings may represent asymptomatic (smoldering) plasma cell myeloma in the absence of myeloma-defining events. Correlation with clinical, laboratory and radiologic findings including MRI (> 1 focal lesion) is advised to determine evidence of end organ damage.  Cytogenetics: 46, XX[20], normal  FISH: Del(13q), IGH rearrangement, t(14;20) detected     Prior Treatment: None     Current Treatment: Observation    History of Present Illness:   Maya Cantu is a 84 y.o. female who comes in to re-establish care after not being seen for over 3 years. She has smoldering myeloma. She has previously been seen at [facility], [facility] and other providers. Had a h/o MGUS in 1990. 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 Normocytic anemia in 2017 resolved. She also had B12 deficiency requiring injections.

## 2024-04-25 ENCOUNTER — OFFICE VISIT (OUTPATIENT)
Age: 84
End: 2024-04-25
Payer: MEDICARE

## 2024-04-25 VITALS
DIASTOLIC BLOOD PRESSURE: 66 MMHG | BODY MASS INDEX: 24.84 KG/M2 | HEART RATE: 62 BPM | WEIGHT: 140.2 LBS | OXYGEN SATURATION: 98 % | RESPIRATION RATE: 18 BRPM | TEMPERATURE: 97.9 F | HEIGHT: 63 IN | SYSTOLIC BLOOD PRESSURE: 157 MMHG

## 2024-04-25 DIAGNOSIS — Z87.19 HISTORY OF GI BLEED: ICD-10-CM

## 2024-04-25 DIAGNOSIS — N28.9 RENAL INSUFFICIENCY: ICD-10-CM

## 2024-04-25 DIAGNOSIS — I10 PRIMARY HYPERTENSION: ICD-10-CM

## 2024-04-25 DIAGNOSIS — D47.2 SMOLDERING MULTIPLE MYELOMA (SMM): Primary | ICD-10-CM

## 2024-04-25 PROCEDURE — 99214 OFFICE O/P EST MOD 30 MIN: CPT | Performed by: INTERNAL MEDICINE

## 2024-04-25 ASSESSMENT — PATIENT HEALTH QUESTIONNAIRE - PHQ9
SUM OF ALL RESPONSES TO PHQ QUESTIONS 1-9: 0
1. LITTLE INTEREST OR PLEASURE IN DOING THINGS: NOT AT ALL
SUM OF ALL RESPONSES TO PHQ QUESTIONS 1-9: 0

## 2024-04-25 NOTE — PROGRESS NOTES
Chief Complaint   Patient presents with    Follow-up           Vitals:    04/25/24 1038   BP: (!) 157/66   Pulse: 62   Resp: 18   Temp: 97.9 °F (36.6 °C)   SpO2: 98%            1. Have you been to the ER, urgent care clinic since your last visit?  Hospitalized since your last visit?  No  2. Have you seen or consulted any other health care providers outside of the [facility] since your last visit?  Include any pap smears or colon screening. No

## 2024-08-19 DIAGNOSIS — I10 PRIMARY HYPERTENSION: ICD-10-CM

## 2024-08-19 DIAGNOSIS — N28.9 RENAL INSUFFICIENCY: ICD-10-CM

## 2024-08-19 DIAGNOSIS — Z87.19 HISTORY OF GI BLEED: ICD-10-CM

## 2024-08-19 DIAGNOSIS — D47.2 SMOLDERING MULTIPLE MYELOMA (SMM): ICD-10-CM

## 2024-08-19 LAB
ALBUMIN SERPL-MCNC: 3.4 G/DL (ref 3.5–5)
ALBUMIN/GLOB SERPL: 0.5 (ref 1.1–2.2)
ALP SERPL-CCNC: 72 U/L (ref 45–117)
ALT SERPL-CCNC: 12 U/L (ref 12–78)
ANION GAP SERPL CALC-SCNC: 3 MMOL/L (ref 5–15)
AST SERPL-CCNC: 19 U/L (ref 15–37)
BILIRUB SERPL-MCNC: 0.4 MG/DL (ref 0.2–1)
BUN SERPL-MCNC: 15 MG/DL (ref 6–20)
BUN/CREAT SERPL: 11 (ref 12–20)
CALCIUM SERPL-MCNC: 9.2 MG/DL (ref 8.5–10.1)
CHLORIDE SERPL-SCNC: 106 MMOL/L (ref 97–108)
CO2 SERPL-SCNC: 25 MMOL/L (ref 21–32)
CREAT SERPL-MCNC: 1.33 MG/DL (ref 0.55–1.02)
ERYTHROCYTE [DISTWIDTH] IN BLOOD BY AUTOMATED COUNT: 14 % (ref 11.5–14.5)
GLOBULIN SER CALC-MCNC: 6.3 G/DL (ref 2–4)
GLUCOSE SERPL-MCNC: 113 MG/DL (ref 65–100)
HCT VFR BLD AUTO: 32.5 % (ref 35–47)
HGB BLD-MCNC: 10.5 G/DL (ref 11.5–16)
MCH RBC QN AUTO: 31.5 PG (ref 26–34)
MCHC RBC AUTO-ENTMCNC: 32.3 G/DL (ref 30–36.5)
MCV RBC AUTO: 97.6 FL (ref 80–99)
NRBC # BLD: 0 K/UL (ref 0–0.01)
NRBC BLD-RTO: 0 PER 100 WBC
PLATELET # BLD AUTO: 184 K/UL (ref 150–400)
PMV BLD AUTO: 11.4 FL (ref 8.9–12.9)
POTASSIUM SERPL-SCNC: 4.6 MMOL/L (ref 3.5–5.1)
PROT SERPL-MCNC: 9.7 G/DL (ref 6.4–8.2)
RBC # BLD AUTO: 3.33 M/UL (ref 3.8–5.2)
SODIUM SERPL-SCNC: 134 MMOL/L (ref 136–145)
WBC # BLD AUTO: 4.6 K/UL (ref 3.6–11)

## 2024-08-23 LAB
ALBUMIN SERPL ELPH-MCNC: 3.5 G/DL (ref 2.9–4.4)
ALBUMIN/GLOB SERPL: 0.7 (ref 0.7–1.7)
ALPHA1 GLOB SERPL ELPH-MCNC: 0.3 G/DL (ref 0–0.4)
ALPHA2 GLOB SERPL ELPH-MCNC: 0.8 G/DL (ref 0.4–1)
B-GLOBULIN SERPL ELPH-MCNC: 0.9 G/DL (ref 0.7–1.3)
GAMMA GLOB SERPL ELPH-MCNC: 3.6 G/DL (ref 0.4–1.8)
GLOBULIN SER-MCNC: 5.6 G/DL (ref 2.2–3.9)
IGA SERPL-MCNC: 9 MG/DL (ref 64–422)
IGG SERPL-MCNC: 4411 MG/DL (ref 586–1602)
IGM SERPL-MCNC: 11 MG/DL (ref 26–217)
INTERPRETATION SERPL IEP-IMP: ABNORMAL
KAPPA LC FREE SER-MCNC: 358.7 MG/L (ref 3.3–19.4)
KAPPA LC FREE/LAMBDA FREE SER: 37.76 (ref 0.26–1.65)
LAMBDA LC FREE SERPL-MCNC: 9.5 MG/L (ref 5.7–26.3)
M PROTEIN SERPL ELPH-MCNC: 3.4 G/DL
PROT SERPL-MCNC: 9.1 G/DL (ref 6–8.5)

## 2024-08-23 NOTE — PROGRESS NOTES
[facility]  Medical Oncology at [place]  [phone]    Hematology / Oncology Established Visit    Reason for Visit:   Maya Cantu is a 84 y.o. female who is seen for multiple myeloma.     Hematology Oncology Treatment History:     Diagnosis: Multiple myeloma, smoldering     Stage: N/A     Pathology:   - 2/4/1998 Bone marrow biopsy: Normocellular marrow, no pathologic diagnosis. (No evidence of plasmacytosis).  - 8/6/18 Bone marrow biopsy: Variably hypercellular marrow with monoclonal kappa restricted plasmacytosis, 40-50%. Trilineage hematopoiesis.   Comment: The bone marrow is variably hypercellular for age ranging 20% to 70%. Monoclonal, kappa restricted plasmacytosis (40-50%) is identified. No atypical lymphocytic infiltrates are present. Trilineage hematopoiesis with maturation is present in the background. Clinical history indicates MGUS in 1990s with negative bone marrow in 1998 without plasmacytosis. Laboratory data indicate monoclonal IgG kappa of 3.6 g/dL with an increase in serum free kappa/lambda ratio of 37.9 (8/2/2018).   Overall findings may represent asymptomatic (smoldering) plasma cell myeloma in the absence of myeloma-defining events. Correlation with clinical, laboratory and radiologic findings including MRI (> 1 focal lesion) is advised to determine evidence of end organ damage.  Cytogenetics: 46, XX[20], normal  FISH: Del(13q), IGH rearrangement, t(14;20) detected     Prior Treatment: None     Current Treatment: Observation    History of Present Illness:   Maya Cantu is a 84 y.o. female who comes in to re-establish care after not being seen for over 3 years. She has smoldering myeloma. She has previously been seen at [facility], [facility] and other providers. Had a h/o MGUS in 1990. 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 Normocytic anemia in 2017 resolved. She also had B12 deficiency requiring injections.

## 2024-08-26 ENCOUNTER — OFFICE VISIT (OUTPATIENT)
Age: 84
End: 2024-08-26
Payer: MEDICARE

## 2024-08-26 VITALS
HEART RATE: 69 BPM | HEIGHT: 63 IN | OXYGEN SATURATION: 98 % | BODY MASS INDEX: 24.63 KG/M2 | TEMPERATURE: 97.3 F | SYSTOLIC BLOOD PRESSURE: 126 MMHG | RESPIRATION RATE: 16 BRPM | WEIGHT: 139 LBS | DIASTOLIC BLOOD PRESSURE: 66 MMHG

## 2024-08-26 DIAGNOSIS — I10 PRIMARY HYPERTENSION: ICD-10-CM

## 2024-08-26 DIAGNOSIS — N18.31 STAGE 3A CHRONIC KIDNEY DISEASE (HCC): ICD-10-CM

## 2024-08-26 DIAGNOSIS — D53.9 MACROCYTIC ANEMIA: ICD-10-CM

## 2024-08-26 DIAGNOSIS — D47.2 SMOLDERING MULTIPLE MYELOMA (SMM): Primary | ICD-10-CM

## 2024-08-26 PROCEDURE — 99214 OFFICE O/P EST MOD 30 MIN: CPT | Performed by: NURSE PRACTITIONER

## 2024-08-26 PROCEDURE — 1090F PRES/ABSN URINE INCON ASSESS: CPT | Performed by: NURSE PRACTITIONER

## 2024-08-26 PROCEDURE — G8427 DOCREV CUR MEDS BY ELIG CLIN: HCPCS | Performed by: NURSE PRACTITIONER

## 2024-08-26 PROCEDURE — 1123F ACP DISCUSS/DSCN MKR DOCD: CPT | Performed by: NURSE PRACTITIONER

## 2024-08-26 PROCEDURE — 1036F TOBACCO NON-USER: CPT | Performed by: NURSE PRACTITIONER

## 2024-08-26 PROCEDURE — G8420 CALC BMI NORM PARAMETERS: HCPCS | Performed by: NURSE PRACTITIONER

## 2024-08-26 ASSESSMENT — PATIENT HEALTH QUESTIONNAIRE - PHQ9
SUM OF ALL RESPONSES TO PHQ9 QUESTIONS 1 & 2: 0
SUM OF ALL RESPONSES TO PHQ QUESTIONS 1-9: 0
2. FEELING DOWN, DEPRESSED OR HOPELESS: NOT AT ALL
SUM OF ALL RESPONSES TO PHQ QUESTIONS 1-9: 0
SUM OF ALL RESPONSES TO PHQ QUESTIONS 1-9: 0
1. LITTLE INTEREST OR PLEASURE IN DOING THINGS: NOT AT ALL
SUM OF ALL RESPONSES TO PHQ QUESTIONS 1-9: 0

## 2024-08-26 NOTE — PROGRESS NOTES
Chief Complaint   Patient presents with    Follow-up           Vitals:    08/26/24 1017   BP: 126/66   Pulse: 69   Resp: 16   Temp: 97.3 °F (36.3 °C)   SpO2: 98%            1. Have you been to the ER, urgent care clinic since your last visit?  Hospitalized since your last visit?  No  2. Have you seen or consulted any other health care providers outside of the [facility] since your last visit?  Include any pap smears or colon screening. Yes PCP

## 2024-12-02 DIAGNOSIS — D47.2 SMOLDERING MULTIPLE MYELOMA (SMM): ICD-10-CM

## 2024-12-04 LAB
ALBUMIN SERPL-MCNC: 3.5 G/DL (ref 3.5–5)
ALBUMIN/GLOB SERPL: 0.5 (ref 1.1–2.2)
ALP SERPL-CCNC: 72 U/L (ref 45–117)
ALT SERPL-CCNC: 11 U/L (ref 12–78)
ANION GAP SERPL CALC-SCNC: 7 MMOL/L (ref 2–12)
AST SERPL-CCNC: 16 U/L (ref 15–37)
BASOPHILS # BLD: 0.1 K/UL (ref 0–0.1)
BASOPHILS NFR BLD: 1 % (ref 0–1)
BILIRUB SERPL-MCNC: 0.5 MG/DL (ref 0.2–1)
BUN SERPL-MCNC: 14 MG/DL (ref 6–20)
BUN/CREAT SERPL: 10 (ref 12–20)
CALCIUM SERPL-MCNC: 9 MG/DL (ref 8.5–10.1)
CHLORIDE SERPL-SCNC: 103 MMOL/L (ref 97–108)
CO2 SERPL-SCNC: 25 MMOL/L (ref 21–32)
CREAT SERPL-MCNC: 1.45 MG/DL (ref 0.55–1.02)
DIFFERENTIAL METHOD BLD: ABNORMAL
EOSINOPHIL # BLD: 0.2 K/UL (ref 0–0.4)
EOSINOPHIL NFR BLD: 3 % (ref 0–7)
ERYTHROCYTE [DISTWIDTH] IN BLOOD BY AUTOMATED COUNT: 14.8 % (ref 11.5–14.5)
GLOBULIN SER CALC-MCNC: 6.4 G/DL (ref 2–4)
GLUCOSE SERPL-MCNC: 163 MG/DL (ref 65–100)
HCT VFR BLD AUTO: 32.6 % (ref 35–47)
HGB BLD-MCNC: 10.7 G/DL (ref 11.5–16)
IMM GRANULOCYTES # BLD AUTO: 0 K/UL (ref 0–0.04)
IMM GRANULOCYTES NFR BLD AUTO: 0 % (ref 0–0.5)
LYMPHOCYTES # BLD: 1.7 K/UL (ref 0.8–3.5)
LYMPHOCYTES NFR BLD: 28 % (ref 12–49)
MCH RBC QN AUTO: 31.5 PG (ref 26–34)
MCHC RBC AUTO-ENTMCNC: 32.8 G/DL (ref 30–36.5)
MCV RBC AUTO: 95.9 FL (ref 80–99)
MONOCYTES # BLD: 0.4 K/UL (ref 0–1)
MONOCYTES NFR BLD: 6 % (ref 5–13)
NEUTS SEG # BLD: 3.6 K/UL (ref 1.8–8)
NEUTS SEG NFR BLD: 62 % (ref 32–75)
NRBC # BLD: 0.03 K/UL (ref 0–0.01)
NRBC BLD-RTO: 0.5 PER 100 WBC
PLATELET # BLD AUTO: 178 K/UL (ref 150–400)
POTASSIUM SERPL-SCNC: 3.8 MMOL/L (ref 3.5–5.1)
PROT SERPL-MCNC: 9.9 G/DL (ref 6.4–8.2)
RBC # BLD AUTO: 3.4 M/UL (ref 3.8–5.2)
RBC MORPH BLD: ABNORMAL
SODIUM SERPL-SCNC: 135 MMOL/L (ref 136–145)
WBC # BLD AUTO: 6 K/UL (ref 3.6–11)

## 2024-12-10 ENCOUNTER — TELEPHONE (OUTPATIENT)
Age: 84
End: 2024-12-10

## 2024-12-10 ENCOUNTER — OFFICE VISIT (OUTPATIENT)
Age: 84
End: 2024-12-10
Payer: MEDICARE

## 2024-12-10 VITALS
WEIGHT: 140.8 LBS | DIASTOLIC BLOOD PRESSURE: 79 MMHG | BODY MASS INDEX: 24.95 KG/M2 | OXYGEN SATURATION: 99 % | SYSTOLIC BLOOD PRESSURE: 193 MMHG | TEMPERATURE: 97.3 F | HEART RATE: 64 BPM | HEIGHT: 63 IN | RESPIRATION RATE: 16 BRPM

## 2024-12-10 DIAGNOSIS — N18.31 STAGE 3A CHRONIC KIDNEY DISEASE (HCC): ICD-10-CM

## 2024-12-10 DIAGNOSIS — K59.00 CONSTIPATION, UNSPECIFIED CONSTIPATION TYPE: ICD-10-CM

## 2024-12-10 DIAGNOSIS — D47.2 SMOLDERING MULTIPLE MYELOMA (SMM): Primary | ICD-10-CM

## 2024-12-10 DIAGNOSIS — I10 PRIMARY HYPERTENSION: ICD-10-CM

## 2024-12-10 DIAGNOSIS — D53.9 MACROCYTIC ANEMIA: ICD-10-CM

## 2024-12-10 LAB
ALBUMIN SERPL ELPH-MCNC: 3.6 G/DL (ref 2.9–4.4)
ALBUMIN/GLOB SERPL: 0.7 (ref 0.7–1.7)
ALPHA1 GLOB SERPL ELPH-MCNC: 0.3 G/DL (ref 0–0.4)
ALPHA2 GLOB SERPL ELPH-MCNC: 0.9 G/DL (ref 0.4–1)
B-GLOBULIN SERPL ELPH-MCNC: 0.9 G/DL (ref 0.7–1.3)
GAMMA GLOB SERPL ELPH-MCNC: 3.8 G/DL (ref 0.4–1.8)
GLOBULIN SER-MCNC: 5.9 G/DL (ref 2.2–3.9)
IGA SERPL-MCNC: 8 MG/DL (ref 64–422)
IGG SERPL-MCNC: 4686 MG/DL (ref 586–1602)
IGM SERPL-MCNC: 10 MG/DL (ref 26–217)
INTERPRETATION SERPL IEP-IMP: ABNORMAL
KAPPA LC FREE SER-MCNC: 401.1 MG/L (ref 3.3–19.4)
KAPPA LC FREE/LAMBDA FREE SER: 46.1 (ref 0.26–1.65)
LAMBDA LC FREE SERPL-MCNC: 8.7 MG/L (ref 5.7–26.3)
M PROTEIN SERPL ELPH-MCNC: 3.5 G/DL
PROT SERPL-MCNC: 9.5 G/DL (ref 6–8.5)

## 2024-12-10 PROCEDURE — 1090F PRES/ABSN URINE INCON ASSESS: CPT | Performed by: NURSE PRACTITIONER

## 2024-12-10 PROCEDURE — 99214 OFFICE O/P EST MOD 30 MIN: CPT | Performed by: NURSE PRACTITIONER

## 2024-12-10 PROCEDURE — G8427 DOCREV CUR MEDS BY ELIG CLIN: HCPCS | Performed by: NURSE PRACTITIONER

## 2024-12-10 PROCEDURE — G8484 FLU IMMUNIZE NO ADMIN: HCPCS | Performed by: NURSE PRACTITIONER

## 2024-12-10 PROCEDURE — 1159F MED LIST DOCD IN RCRD: CPT | Performed by: NURSE PRACTITIONER

## 2024-12-10 PROCEDURE — 1036F TOBACCO NON-USER: CPT | Performed by: NURSE PRACTITIONER

## 2024-12-10 PROCEDURE — 1126F AMNT PAIN NOTED NONE PRSNT: CPT | Performed by: NURSE PRACTITIONER

## 2024-12-10 PROCEDURE — 1123F ACP DISCUSS/DSCN MKR DOCD: CPT | Performed by: NURSE PRACTITIONER

## 2024-12-10 PROCEDURE — G8420 CALC BMI NORM PARAMETERS: HCPCS | Performed by: NURSE PRACTITIONER

## 2024-12-10 PROCEDURE — 1160F RVW MEDS BY RX/DR IN RCRD: CPT | Performed by: NURSE PRACTITIONER

## 2024-12-10 RX ORDER — METOPROLOL SUCCINATE 100 MG/1
TABLET, EXTENDED RELEASE ORAL
COMMUNITY
Start: 2024-09-10

## 2024-12-10 ASSESSMENT — PATIENT HEALTH QUESTIONNAIRE - PHQ9
2. FEELING DOWN, DEPRESSED OR HOPELESS: NOT AT ALL
SUM OF ALL RESPONSES TO PHQ QUESTIONS 1-9: 0
1. LITTLE INTEREST OR PLEASURE IN DOING THINGS: NOT AT ALL
SUM OF ALL RESPONSES TO PHQ9 QUESTIONS 1 & 2: 0

## 2024-12-10 NOTE — PROGRESS NOTES
Chief Complaint   Patient presents with    Follow-up           Vitals:    12/10/24 1432   BP: (!) 193/79   Pulse: 64   Resp: 16   Temp: 97.3 °F (36.3 °C)   SpO2: 99%            1. Have you been to the ER, urgent care clinic since your last visit?  Hospitalized since your last visit?  No  2. Have you seen or consulted any other health care providers outside of the [facility] since your last visit?  Include any pap smears or colon screening. Yes PCP

## 2024-12-10 NOTE — PROGRESS NOTES
[facility]  Medical Oncology at [place]  [phone]    Hematology / Oncology Established Visit    Reason for Visit:   Maya Cantu is a 84 y.o. female who is seen for multiple myeloma.     Hematology Oncology Treatment History:     Diagnosis: Multiple myeloma, smoldering     Stage: N/A     Pathology:   - 2/4/1998 Bone marrow biopsy: Normocellular marrow, no pathologic diagnosis. (No evidence of plasmacytosis).  - 08/06/18 Bone marrow biopsy: Variably hypercellular marrow with monoclonal kappa restricted plasmacytosis, 40-50%. Trilineage hematopoiesis.   Comment: The bone marrow is variably hypercellular for age ranging 20% to 70%. Monoclonal, kappa restricted plasmacytosis (40-50%) is identified. No atypical lymphocytic infiltrates are present. Trilineage hematopoiesis with maturation is present in the background. Clinical history indicates MGUS in 1990s with negative bone marrow in 1998 without plasmacytosis. Laboratory data indicate monoclonal IgG kappa of 3.6 g/dL with an increase in serum free kappa/lambda ratio of 37.9 (8/2/2018).   Overall findings may represent asymptomatic (smoldering) plasma cell myeloma in the absence of myeloma-defining events. Correlation with clinical, laboratory and radiologic findings including MRI (> 1 focal lesion) is advised to determine evidence of end organ damage.  Cytogenetics: 46, XX[20], normal  FISH: Del(13q), IGH rearrangement, t(14;20) detected       Prior Treatment: None     Current Treatment: Observation    History of Present Illness:   Maya Cantu is a 84 y.o. female who comes in to re-establish care after not being seen for over 3 years. She has smoldering myeloma. She has previously been seen at [facility], [facility] and other providers. Had a h/o MGUS in 1990. 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 Normocytic anemia in 2017 resolved. She also had B12 deficiency requiring injections.

## 2024-12-10 NOTE — TELEPHONE ENCOUNTER
Gammopathy labs resulted after patient left the office.  Overall stable. No change to surveillance plan.  Nursing staff to notify patient.

## 2025-02-13 ENCOUNTER — TELEPHONE (OUTPATIENT)
Age: 85
End: 2025-02-13

## 2025-04-10 DIAGNOSIS — D53.9 MACROCYTIC ANEMIA: ICD-10-CM

## 2025-04-10 DIAGNOSIS — D47.2 SMOLDERING MULTIPLE MYELOMA (SMM): ICD-10-CM

## 2025-04-10 LAB
ALBUMIN SERPL-MCNC: 3.3 G/DL (ref 3.5–5)
ALBUMIN/GLOB SERPL: 0.5 (ref 1.1–2.2)
ALP SERPL-CCNC: 66 U/L (ref 45–117)
ALT SERPL-CCNC: 11 U/L (ref 12–78)
ANION GAP SERPL CALC-SCNC: 4 MMOL/L (ref 2–12)
AST SERPL-CCNC: 13 U/L (ref 15–37)
BASOPHILS # BLD: 0.05 K/UL (ref 0–0.1)
BASOPHILS NFR BLD: 1.1 % (ref 0–1)
BILIRUB SERPL-MCNC: 0.4 MG/DL (ref 0.2–1)
BUN SERPL-MCNC: 12 MG/DL (ref 6–20)
BUN/CREAT SERPL: 9 (ref 12–20)
CALCIUM SERPL-MCNC: 8.8 MG/DL (ref 8.5–10.1)
CHLORIDE SERPL-SCNC: 105 MMOL/L (ref 97–108)
CO2 SERPL-SCNC: 26 MMOL/L (ref 21–32)
CREAT SERPL-MCNC: 1.37 MG/DL (ref 0.55–1.02)
DIFFERENTIAL METHOD BLD: ABNORMAL
EOSINOPHIL # BLD: 0.36 K/UL (ref 0–0.4)
EOSINOPHIL NFR BLD: 7.8 % (ref 0–7)
ERYTHROCYTE [DISTWIDTH] IN BLOOD BY AUTOMATED COUNT: 14.6 % (ref 11.5–14.5)
FOLATE SERPL-MCNC: 17.7 NG/ML (ref 5–21)
GLOBULIN SER CALC-MCNC: 6.6 G/DL (ref 2–4)
GLUCOSE SERPL-MCNC: 101 MG/DL (ref 65–100)
HCT VFR BLD AUTO: 30.6 % (ref 35–47)
HGB BLD-MCNC: 10 G/DL (ref 11.5–16)
IMM GRANULOCYTES # BLD AUTO: 0.01 K/UL (ref 0–0.04)
IMM GRANULOCYTES NFR BLD AUTO: 0.2 % (ref 0–0.5)
LYMPHOCYTES # BLD: 1.86 K/UL (ref 0.8–3.5)
LYMPHOCYTES NFR BLD: 40.5 % (ref 12–49)
MCH RBC QN AUTO: 31.1 PG (ref 26–34)
MCHC RBC AUTO-ENTMCNC: 32.7 G/DL (ref 30–36.5)
MCV RBC AUTO: 95 FL (ref 80–99)
MONOCYTES # BLD: 0.27 K/UL (ref 0–1)
MONOCYTES NFR BLD: 5.9 % (ref 5–13)
NEUTS SEG # BLD: 2.04 K/UL (ref 1.8–8)
NEUTS SEG NFR BLD: 44.5 % (ref 32–75)
NRBC # BLD: 0 K/UL (ref 0–0.01)
NRBC BLD-RTO: 0 PER 100 WBC
PLATELET # BLD AUTO: 180 K/UL (ref 150–400)
PMV BLD AUTO: 11.9 FL (ref 8.9–12.9)
POTASSIUM SERPL-SCNC: 3.7 MMOL/L (ref 3.5–5.1)
PROT SERPL-MCNC: 9.9 G/DL (ref 6.4–8.2)
RBC # BLD AUTO: 3.22 M/UL (ref 3.8–5.2)
SODIUM SERPL-SCNC: 135 MMOL/L (ref 136–145)
VIT B12 SERPL-MCNC: 1519 PG/ML (ref 193–986)
WBC # BLD AUTO: 4.6 K/UL (ref 3.6–11)

## 2025-04-16 LAB
ALBUMIN SERPL ELPH-MCNC: 3.7 G/DL (ref 2.9–4.4)
ALBUMIN/GLOB SERPL: 0.7 (ref 0.7–1.7)
ALPHA1 GLOB SERPL ELPH-MCNC: 0.2 G/DL (ref 0–0.4)
ALPHA2 GLOB SERPL ELPH-MCNC: 0.7 G/DL (ref 0.4–1)
B-GLOBULIN SERPL ELPH-MCNC: 0.9 G/DL (ref 0.7–1.3)
GAMMA GLOB SERPL ELPH-MCNC: 3.7 G/DL (ref 0.4–1.8)
GLOBULIN SER-MCNC: 5.6 G/DL (ref 2.2–3.9)
IGA SERPL-MCNC: 10 MG/DL (ref 64–422)
IGG SERPL-MCNC: 5278 MG/DL (ref 586–1602)
IGM SERPL-MCNC: 11 MG/DL (ref 26–217)
INTERPRETATION SERPL IEP-IMP: ABNORMAL
KAPPA LC FREE SER-MCNC: 447.9 MG/L (ref 3.3–19.4)
KAPPA LC FREE/LAMBDA FREE SER: 50.33 (ref 0.26–1.65)
LAMBDA LC FREE SERPL-MCNC: 8.9 MG/L (ref 5.7–26.3)
M PROTEIN SERPL ELPH-MCNC: 3.5 G/DL
PROT SERPL-MCNC: 9.3 G/DL (ref 6–8.5)

## 2025-04-17 ENCOUNTER — OFFICE VISIT (OUTPATIENT)
Age: 85
End: 2025-04-17
Payer: MEDICARE

## 2025-04-17 VITALS
HEART RATE: 63 BPM | WEIGHT: 140 LBS | HEIGHT: 63 IN | BODY MASS INDEX: 24.8 KG/M2 | TEMPERATURE: 99 F | SYSTOLIC BLOOD PRESSURE: 186 MMHG | OXYGEN SATURATION: 99 % | DIASTOLIC BLOOD PRESSURE: 77 MMHG | RESPIRATION RATE: 16 BRPM

## 2025-04-17 DIAGNOSIS — I10 PRIMARY HYPERTENSION: ICD-10-CM

## 2025-04-17 DIAGNOSIS — G47.09 OTHER INSOMNIA: ICD-10-CM

## 2025-04-17 DIAGNOSIS — N18.31 STAGE 3A CHRONIC KIDNEY DISEASE (HCC): ICD-10-CM

## 2025-04-17 DIAGNOSIS — D47.2 SMOLDERING MULTIPLE MYELOMA (SMM): Primary | ICD-10-CM

## 2025-04-17 DIAGNOSIS — D53.9 MACROCYTIC ANEMIA: ICD-10-CM

## 2025-04-17 PROCEDURE — G2211 COMPLEX E/M VISIT ADD ON: HCPCS | Performed by: INTERNAL MEDICINE

## 2025-04-17 PROCEDURE — 1126F AMNT PAIN NOTED NONE PRSNT: CPT | Performed by: INTERNAL MEDICINE

## 2025-04-17 PROCEDURE — 1123F ACP DISCUSS/DSCN MKR DOCD: CPT | Performed by: INTERNAL MEDICINE

## 2025-04-17 PROCEDURE — 1090F PRES/ABSN URINE INCON ASSESS: CPT | Performed by: INTERNAL MEDICINE

## 2025-04-17 PROCEDURE — 99214 OFFICE O/P EST MOD 30 MIN: CPT | Performed by: INTERNAL MEDICINE

## 2025-04-17 PROCEDURE — G8427 DOCREV CUR MEDS BY ELIG CLIN: HCPCS | Performed by: INTERNAL MEDICINE

## 2025-04-17 PROCEDURE — 1036F TOBACCO NON-USER: CPT | Performed by: INTERNAL MEDICINE

## 2025-04-17 PROCEDURE — 1159F MED LIST DOCD IN RCRD: CPT | Performed by: INTERNAL MEDICINE

## 2025-04-17 PROCEDURE — G8420 CALC BMI NORM PARAMETERS: HCPCS | Performed by: INTERNAL MEDICINE

## 2025-04-17 ASSESSMENT — PATIENT HEALTH QUESTIONNAIRE - PHQ9
SUM OF ALL RESPONSES TO PHQ QUESTIONS 1-9: 0
SUM OF ALL RESPONSES TO PHQ QUESTIONS 1-9: 0
1. LITTLE INTEREST OR PLEASURE IN DOING THINGS: NOT AT ALL
SUM OF ALL RESPONSES TO PHQ QUESTIONS 1-9: 0
2. FEELING DOWN, DEPRESSED OR HOPELESS: NOT AT ALL
SUM OF ALL RESPONSES TO PHQ QUESTIONS 1-9: 0

## 2025-04-17 NOTE — PROGRESS NOTES
Maya Cantu is a 85 y.o. female follow up for         1. Have you been to the ER, urgent care clinic since your last visit?  Hospitalized since your last visit?{no    2. Have you seen or consulted any other health care providers outside of the [facility] since your last visit?  Include any pap smears or colon screening. no

## 2025-04-17 NOTE — PROGRESS NOTES
[facility]  Medical Oncology at [place]  [phone]    Hematology / Oncology Established Visit    Reason for Visit:   Maya Cantu is a 85 y.o. female who is seen for multiple myeloma.     Hematology Oncology Treatment History:     Diagnosis: Multiple myeloma, smoldering     Stage: N/A     Pathology:   - 2/4/1998 Bone marrow biopsy: Normocellular marrow, no pathologic diagnosis. (No evidence of plasmacytosis).  - 08/06/18 Bone marrow biopsy: Variably hypercellular marrow with monoclonal kappa restricted plasmacytosis, 40-50%. Trilineage hematopoiesis.   Comment: The bone marrow is variably hypercellular for age ranging 20% to 70%. Monoclonal, kappa restricted plasmacytosis (40-50%) is identified. No atypical lymphocytic infiltrates are present. Trilineage hematopoiesis with maturation is present in the background. Clinical history indicates MGUS in 1990s with negative bone marrow in 1998 without plasmacytosis. Laboratory data indicate monoclonal IgG kappa of 3.6 g/dL with an increase in serum free kappa/lambda ratio of 37.9 (8/2/2018).   Overall findings may represent asymptomatic (smoldering) plasma cell myeloma in the absence of myeloma-defining events. Correlation with clinical, laboratory and radiologic findings including MRI (> 1 focal lesion) is advised to determine evidence of end organ damage.  Cytogenetics: 46, XX[20], normal  FISH: Del(13q), IGH rearrangement, t(14;20) detected       Prior Treatment: None     Current Treatment: Observation    History of Present Illness:   Maya Cantu is a 85 y.o. female who comes in to re-establish care after not being seen for over 3 years. She has smoldering myeloma. She has previously been seen at [facility], [facility] and other providers. Had a h/o MGUS in 1990. 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 Normocytic anemia in 2017 resolved. She also had B12 deficiency requiring injections.

## 2025-06-30 ENCOUNTER — HOSPITAL ENCOUNTER (INPATIENT)
Facility: HOSPITAL | Age: 85
LOS: 3 days | Discharge: HOME | DRG: 522 | End: 2025-07-03
Attending: STUDENT IN AN ORGANIZED HEALTH CARE EDUCATION/TRAINING PROGRAM | Admitting: STUDENT IN AN ORGANIZED HEALTH CARE EDUCATION/TRAINING PROGRAM
Payer: MEDICARE

## 2025-06-30 ENCOUNTER — APPOINTMENT (OUTPATIENT)
Facility: HOSPITAL | Age: 85
DRG: 522 | End: 2025-06-30
Payer: MEDICARE

## 2025-06-30 ENCOUNTER — ANESTHESIA EVENT (OUTPATIENT)
Facility: HOSPITAL | Age: 85
DRG: 522 | End: 2025-06-30
Payer: MEDICARE

## 2025-06-30 ENCOUNTER — ANESTHESIA (OUTPATIENT)
Facility: HOSPITAL | Age: 85
DRG: 522 | End: 2025-06-30
Payer: MEDICARE

## 2025-06-30 DIAGNOSIS — S72.001A CLOSED FRACTURE OF NECK OF RIGHT FEMUR, INITIAL ENCOUNTER (HCC): Primary | ICD-10-CM

## 2025-06-30 DIAGNOSIS — S00.93XA TRAUMATIC HEMATOMA OF HEAD, INITIAL ENCOUNTER: ICD-10-CM

## 2025-06-30 LAB
ALBUMIN SERPL-MCNC: 3.4 G/DL (ref 3.5–5)
ALBUMIN/GLOB SERPL: 0.5 (ref 1.1–2.2)
ALP SERPL-CCNC: 62 U/L (ref 45–117)
ALT SERPL-CCNC: 12 U/L (ref 12–78)
ANION GAP SERPL CALC-SCNC: 7 MMOL/L (ref 2–12)
AST SERPL-CCNC: 21 U/L (ref 15–37)
BASOPHILS # BLD: 0.04 K/UL (ref 0–0.1)
BASOPHILS NFR BLD: 0.9 % (ref 0–1)
BILIRUB SERPL-MCNC: 0.4 MG/DL (ref 0.2–1)
BUN SERPL-MCNC: 14 MG/DL (ref 6–20)
BUN/CREAT SERPL: 10 (ref 12–20)
CALCIUM SERPL-MCNC: 8.9 MG/DL (ref 8.5–10.1)
CHLORIDE SERPL-SCNC: 104 MMOL/L (ref 97–108)
CO2 SERPL-SCNC: 24 MMOL/L (ref 21–32)
CREAT SERPL-MCNC: 1.38 MG/DL (ref 0.55–1.02)
DIFFERENTIAL METHOD BLD: ABNORMAL
EOSINOPHIL # BLD: 0.25 K/UL (ref 0–0.4)
EOSINOPHIL NFR BLD: 5.4 % (ref 0–7)
ERYTHROCYTE [DISTWIDTH] IN BLOOD BY AUTOMATED COUNT: 14.2 % (ref 11.5–14.5)
GLOBULIN SER CALC-MCNC: 6.5 G/DL (ref 2–4)
GLUCOSE SERPL-MCNC: 94 MG/DL (ref 65–100)
HCT VFR BLD AUTO: 32.9 % (ref 35–47)
HGB BLD-MCNC: 11 G/DL (ref 11.5–16)
IMM GRANULOCYTES # BLD AUTO: 0.02 K/UL (ref 0–0.04)
IMM GRANULOCYTES NFR BLD AUTO: 0.4 % (ref 0–0.5)
INR PPP: 1.1 (ref 0.9–1.1)
LYMPHOCYTES # BLD: 2.62 K/UL (ref 0.8–3.5)
LYMPHOCYTES NFR BLD: 56.8 % (ref 12–49)
MCH RBC QN AUTO: 32.1 PG (ref 26–34)
MCHC RBC AUTO-ENTMCNC: 33.4 G/DL (ref 30–36.5)
MCV RBC AUTO: 95.9 FL (ref 80–99)
MONOCYTES # BLD: 0.25 K/UL (ref 0–1)
MONOCYTES NFR BLD: 5.4 % (ref 5–13)
NEUTS SEG # BLD: 1.43 K/UL (ref 1.8–8)
NEUTS SEG NFR BLD: 31.1 % (ref 32–75)
NRBC # BLD: 0 K/UL (ref 0–0.01)
NRBC BLD-RTO: 0 PER 100 WBC
PLATELET # BLD AUTO: 157 K/UL (ref 150–400)
PMV BLD AUTO: 11.8 FL (ref 8.9–12.9)
POTASSIUM SERPL-SCNC: 3.5 MMOL/L (ref 3.5–5.1)
PROT SERPL-MCNC: 9.9 G/DL (ref 6.4–8.2)
PROTHROMBIN TIME: 11.3 SEC (ref 9.2–11.2)
RBC # BLD AUTO: 3.43 M/UL (ref 3.8–5.2)
SODIUM SERPL-SCNC: 135 MMOL/L (ref 136–145)
TROPONIN I SERPL HS-MCNC: 15 NG/L (ref 0–51)
WBC # BLD AUTO: 4.6 K/UL (ref 3.6–11)

## 2025-06-30 PROCEDURE — 7100000001 HC PACU RECOVERY - ADDTL 15 MIN: Performed by: ORTHOPAEDIC SURGERY

## 2025-06-30 PROCEDURE — 96374 THER/PROPH/DIAG INJ IV PUSH: CPT

## 2025-06-30 PROCEDURE — 96375 TX/PRO/DX INJ NEW DRUG ADDON: CPT

## 2025-06-30 PROCEDURE — 6360000002 HC RX W HCPCS: Performed by: ANESTHESIOLOGY

## 2025-06-30 PROCEDURE — 2500000003 HC RX 250 WO HCPCS: Performed by: PHYSICIAN ASSISTANT

## 2025-06-30 PROCEDURE — 86850 RBC ANTIBODY SCREEN: CPT

## 2025-06-30 PROCEDURE — 6370000000 HC RX 637 (ALT 250 FOR IP): Performed by: ORTHOPAEDIC SURGERY

## 2025-06-30 PROCEDURE — 80053 COMPREHEN METABOLIC PANEL: CPT

## 2025-06-30 PROCEDURE — 86901 BLOOD TYPING SEROLOGIC RH(D): CPT

## 2025-06-30 PROCEDURE — 86900 BLOOD TYPING SEROLOGIC ABO: CPT

## 2025-06-30 PROCEDURE — 6360000002 HC RX W HCPCS: Performed by: PHYSICIAN ASSISTANT

## 2025-06-30 PROCEDURE — 6360000002 HC RX W HCPCS: Performed by: NURSE ANESTHETIST, CERTIFIED REGISTERED

## 2025-06-30 PROCEDURE — 2580000003 HC RX 258: Performed by: ANESTHESIOLOGY

## 2025-06-30 PROCEDURE — 1100000000 HC RM PRIVATE

## 2025-06-30 PROCEDURE — 2580000003 HC RX 258: Performed by: STUDENT IN AN ORGANIZED HEALTH CARE EDUCATION/TRAINING PROGRAM

## 2025-06-30 PROCEDURE — 93005 ELECTROCARDIOGRAM TRACING: CPT | Performed by: STUDENT IN AN ORGANIZED HEALTH CARE EDUCATION/TRAINING PROGRAM

## 2025-06-30 PROCEDURE — 73552 X-RAY EXAM OF FEMUR 2/>: CPT

## 2025-06-30 PROCEDURE — 6360000002 HC RX W HCPCS: Performed by: ORTHOPAEDIC SURGERY

## 2025-06-30 PROCEDURE — 0SRR0JA REPLACEMENT OF RIGHT HIP JOINT, FEMORAL SURFACE WITH SYNTHETIC SUBSTITUTE, UNCEMENTED, OPEN APPROACH: ICD-10-PCS | Performed by: ORTHOPAEDIC SURGERY

## 2025-06-30 PROCEDURE — 2500000003 HC RX 250 WO HCPCS: Performed by: ORTHOPAEDIC SURGERY

## 2025-06-30 PROCEDURE — 72125 CT NECK SPINE W/O DYE: CPT

## 2025-06-30 PROCEDURE — 3700000001 HC ADD 15 MINUTES (ANESTHESIA): Performed by: ORTHOPAEDIC SURGERY

## 2025-06-30 PROCEDURE — 86880 COOMBS TEST DIRECT: CPT

## 2025-06-30 PROCEDURE — 99285 EMERGENCY DEPT VISIT HI MDM: CPT

## 2025-06-30 PROCEDURE — 85025 COMPLETE CBC W/AUTO DIFF WBC: CPT

## 2025-06-30 PROCEDURE — 6370000000 HC RX 637 (ALT 250 FOR IP): Performed by: STUDENT IN AN ORGANIZED HEALTH CARE EDUCATION/TRAINING PROGRAM

## 2025-06-30 PROCEDURE — 84484 ASSAY OF TROPONIN QUANT: CPT

## 2025-06-30 PROCEDURE — 36415 COLL VENOUS BLD VENIPUNCTURE: CPT

## 2025-06-30 PROCEDURE — C1776 JOINT DEVICE (IMPLANTABLE): HCPCS | Performed by: ORTHOPAEDIC SURGERY

## 2025-06-30 PROCEDURE — 3700000000 HC ANESTHESIA ATTENDED CARE: Performed by: ORTHOPAEDIC SURGERY

## 2025-06-30 PROCEDURE — 86920 COMPATIBILITY TEST SPIN: CPT

## 2025-06-30 PROCEDURE — 3600000004 HC SURGERY LEVEL 4 BASE: Performed by: ORTHOPAEDIC SURGERY

## 2025-06-30 PROCEDURE — 2709999900 HC NON-CHARGEABLE SUPPLY: Performed by: ORTHOPAEDIC SURGERY

## 2025-06-30 PROCEDURE — 71045 X-RAY EXAM CHEST 1 VIEW: CPT

## 2025-06-30 PROCEDURE — 3600000014 HC SURGERY LEVEL 4 ADDTL 15MIN: Performed by: ORTHOPAEDIC SURGERY

## 2025-06-30 PROCEDURE — 2500000003 HC RX 250 WO HCPCS: Performed by: STUDENT IN AN ORGANIZED HEALTH CARE EDUCATION/TRAINING PROGRAM

## 2025-06-30 PROCEDURE — 7100000000 HC PACU RECOVERY - FIRST 15 MIN: Performed by: ORTHOPAEDIC SURGERY

## 2025-06-30 PROCEDURE — 72170 X-RAY EXAM OF PELVIS: CPT

## 2025-06-30 PROCEDURE — 70450 CT HEAD/BRAIN W/O DYE: CPT

## 2025-06-30 PROCEDURE — 6360000002 HC RX W HCPCS: Performed by: STUDENT IN AN ORGANIZED HEALTH CARE EDUCATION/TRAINING PROGRAM

## 2025-06-30 PROCEDURE — 2500000003 HC RX 250 WO HCPCS: Performed by: NURSE ANESTHETIST, CERTIFIED REGISTERED

## 2025-06-30 PROCEDURE — 85610 PROTHROMBIN TIME: CPT

## 2025-06-30 PROCEDURE — 86922 COMPATIBILITY TEST ANTIGLOB: CPT

## 2025-06-30 PROCEDURE — 94761 N-INVAS EAR/PLS OXIMETRY MLT: CPT

## 2025-06-30 PROCEDURE — 86921 COMPATIBILITY TEST INCUBATE: CPT

## 2025-06-30 PROCEDURE — 72192 CT PELVIS W/O DYE: CPT

## 2025-06-30 DEVICE — SELF CENTERING BI-POLAR HEAD 28MM ID 45MM OD
Type: IMPLANTABLE DEVICE | Site: HIP | Status: FUNCTIONAL
Brand: SELF CENTERING

## 2025-06-30 DEVICE — ACTIS DUOFIX HIP PROSTHESIS (FEMORAL STEM 12/14 TAPER CEMENTLESS SIZE 4 STD COLLAR)  CE
Type: IMPLANTABLE DEVICE | Site: HIP | Status: FUNCTIONAL
Brand: ACTIS

## 2025-06-30 DEVICE — ARTICUL/EZE FEMORAL HEAD DIAMETER 28MM +1.5 12/14 TAPER
Type: IMPLANTABLE DEVICE | Site: HIP | Status: FUNCTIONAL
Brand: ARTICUL/EZE

## 2025-06-30 DEVICE — IMPL CAPPED H6 HEMI UNI/BIPOLAR DEPUYSYNTHES: Type: IMPLANTABLE DEVICE | Site: HIP | Status: FUNCTIONAL

## 2025-06-30 RX ORDER — VANCOMYCIN HYDROCHLORIDE 1 G/20ML
INJECTION, POWDER, LYOPHILIZED, FOR SOLUTION INTRAVENOUS PRN
Status: DISCONTINUED | OUTPATIENT
Start: 2025-06-30 | End: 2025-06-30 | Stop reason: HOSPADM

## 2025-06-30 RX ORDER — SODIUM CHLORIDE 0.9 % (FLUSH) 0.9 %
5-40 SYRINGE (ML) INJECTION EVERY 12 HOURS SCHEDULED
Status: DISCONTINUED | OUTPATIENT
Start: 2025-06-30 | End: 2025-07-03 | Stop reason: HOSPADM

## 2025-06-30 RX ORDER — EPHEDRINE SULFATE/0.9% NACL/PF 25 MG/5 ML
SYRINGE (ML) INTRAVENOUS
Status: DISCONTINUED | OUTPATIENT
Start: 2025-06-30 | End: 2025-06-30 | Stop reason: SDUPTHER

## 2025-06-30 RX ORDER — MAGNESIUM SULFATE IN WATER 40 MG/ML
2000 INJECTION, SOLUTION INTRAVENOUS PRN
Status: DISCONTINUED | OUTPATIENT
Start: 2025-06-30 | End: 2025-07-03 | Stop reason: HOSPADM

## 2025-06-30 RX ORDER — POLYETHYLENE GLYCOL 3350 17 G/17G
17 POWDER, FOR SOLUTION ORAL DAILY PRN
Status: DISCONTINUED | OUTPATIENT
Start: 2025-06-30 | End: 2025-07-03 | Stop reason: HOSPADM

## 2025-06-30 RX ORDER — HYDROMORPHONE HYDROCHLORIDE 2 MG/1
1 TABLET ORAL EVERY 4 HOURS PRN
Refills: 0 | Status: DISCONTINUED | OUTPATIENT
Start: 2025-06-30 | End: 2025-07-02

## 2025-06-30 RX ORDER — SODIUM CHLORIDE 0.9 % (FLUSH) 0.9 %
5-40 SYRINGE (ML) INJECTION PRN
Status: DISCONTINUED | OUTPATIENT
Start: 2025-06-30 | End: 2025-07-03 | Stop reason: HOSPADM

## 2025-06-30 RX ORDER — ONDANSETRON 2 MG/ML
4 INJECTION INTRAMUSCULAR; INTRAVENOUS EVERY 6 HOURS PRN
Status: DISCONTINUED | OUTPATIENT
Start: 2025-06-30 | End: 2025-07-03 | Stop reason: HOSPADM

## 2025-06-30 RX ORDER — SODIUM CHLORIDE 9 MG/ML
INJECTION, SOLUTION INTRAVENOUS PRN
Status: DISCONTINUED | OUTPATIENT
Start: 2025-06-30 | End: 2025-07-03 | Stop reason: HOSPADM

## 2025-06-30 RX ORDER — ACETAMINOPHEN 325 MG/1
650 TABLET ORAL EVERY 6 HOURS PRN
Status: DISCONTINUED | OUTPATIENT
Start: 2025-06-30 | End: 2025-07-03 | Stop reason: HOSPADM

## 2025-06-30 RX ORDER — LABETALOL HYDROCHLORIDE 5 MG/ML
10 INJECTION, SOLUTION INTRAVENOUS ONCE
Status: COMPLETED | OUTPATIENT
Start: 2025-06-30 | End: 2025-06-30

## 2025-06-30 RX ORDER — MIDAZOLAM HYDROCHLORIDE 2 MG/2ML
2 INJECTION, SOLUTION INTRAMUSCULAR; INTRAVENOUS
Status: DISCONTINUED | OUTPATIENT
Start: 2025-06-30 | End: 2025-06-30 | Stop reason: HOSPADM

## 2025-06-30 RX ORDER — PANTOPRAZOLE SODIUM 40 MG/1
40 TABLET, DELAYED RELEASE ORAL
Status: DISCONTINUED | OUTPATIENT
Start: 2025-07-01 | End: 2025-07-03 | Stop reason: HOSPADM

## 2025-06-30 RX ORDER — DULOXETIN HYDROCHLORIDE 30 MG/1
30 CAPSULE, DELAYED RELEASE ORAL DAILY
Status: DISCONTINUED | OUTPATIENT
Start: 2025-06-30 | End: 2025-06-30

## 2025-06-30 RX ORDER — POTASSIUM CHLORIDE 750 MG/1
40 TABLET, EXTENDED RELEASE ORAL PRN
Status: DISCONTINUED | OUTPATIENT
Start: 2025-06-30 | End: 2025-07-03 | Stop reason: HOSPADM

## 2025-06-30 RX ORDER — LIDOCAINE HYDROCHLORIDE 10 MG/ML
1 INJECTION, SOLUTION EPIDURAL; INFILTRATION; INTRACAUDAL; PERINEURAL
Status: DISCONTINUED | OUTPATIENT
Start: 2025-06-30 | End: 2025-06-30 | Stop reason: HOSPADM

## 2025-06-30 RX ORDER — TRANEXAMIC ACID 100 MG/ML
INJECTION, SOLUTION INTRAVENOUS
Status: DISCONTINUED | OUTPATIENT
Start: 2025-06-30 | End: 2025-06-30 | Stop reason: SDUPTHER

## 2025-06-30 RX ORDER — DULOXETIN HYDROCHLORIDE 30 MG/1
60 CAPSULE, DELAYED RELEASE ORAL DAILY
Status: DISCONTINUED | OUTPATIENT
Start: 2025-06-30 | End: 2025-07-03 | Stop reason: HOSPADM

## 2025-06-30 RX ORDER — PROPOFOL 10 MG/ML
INJECTION, EMULSION INTRAVENOUS
Status: DISCONTINUED | OUTPATIENT
Start: 2025-06-30 | End: 2025-06-30 | Stop reason: SDUPTHER

## 2025-06-30 RX ORDER — FENTANYL CITRATE 50 UG/ML
INJECTION, SOLUTION INTRAMUSCULAR; INTRAVENOUS
Status: DISCONTINUED | OUTPATIENT
Start: 2025-06-30 | End: 2025-06-30 | Stop reason: SDUPTHER

## 2025-06-30 RX ORDER — DIPHENHYDRAMINE HYDROCHLORIDE 50 MG/ML
12.5 INJECTION, SOLUTION INTRAMUSCULAR; INTRAVENOUS
Status: DISCONTINUED | OUTPATIENT
Start: 2025-06-30 | End: 2025-06-30 | Stop reason: HOSPADM

## 2025-06-30 RX ORDER — SODIUM CHLORIDE, SODIUM LACTATE, POTASSIUM CHLORIDE, CALCIUM CHLORIDE 600; 310; 30; 20 MG/100ML; MG/100ML; MG/100ML; MG/100ML
INJECTION, SOLUTION INTRAVENOUS CONTINUOUS
Status: DISCONTINUED | OUTPATIENT
Start: 2025-06-30 | End: 2025-06-30 | Stop reason: HOSPADM

## 2025-06-30 RX ORDER — LABETALOL HYDROCHLORIDE 5 MG/ML
5 INJECTION, SOLUTION INTRAVENOUS EVERY 10 MIN PRN
Status: DISCONTINUED | OUTPATIENT
Start: 2025-06-30 | End: 2025-06-30

## 2025-06-30 RX ORDER — NALOXONE HYDROCHLORIDE 0.4 MG/ML
INJECTION, SOLUTION INTRAMUSCULAR; INTRAVENOUS; SUBCUTANEOUS PRN
Status: DISCONTINUED | OUTPATIENT
Start: 2025-06-30 | End: 2025-06-30 | Stop reason: HOSPADM

## 2025-06-30 RX ORDER — ONDANSETRON 2 MG/ML
4 INJECTION INTRAMUSCULAR; INTRAVENOUS
Status: DISCONTINUED | OUTPATIENT
Start: 2025-06-30 | End: 2025-06-30 | Stop reason: HOSPADM

## 2025-06-30 RX ORDER — AMLODIPINE BESYLATE 5 MG/1
10 TABLET ORAL DAILY
Status: DISCONTINUED | OUTPATIENT
Start: 2025-06-30 | End: 2025-07-03 | Stop reason: HOSPADM

## 2025-06-30 RX ORDER — POTASSIUM CHLORIDE 7.45 MG/ML
10 INJECTION INTRAVENOUS PRN
Status: DISCONTINUED | OUTPATIENT
Start: 2025-06-30 | End: 2025-07-03 | Stop reason: HOSPADM

## 2025-06-30 RX ORDER — HYDROMORPHONE HYDROCHLORIDE 2 MG/1
2 TABLET ORAL EVERY 4 HOURS PRN
Refills: 0 | Status: DISCONTINUED | OUTPATIENT
Start: 2025-06-30 | End: 2025-07-02

## 2025-06-30 RX ORDER — 0.9 % SODIUM CHLORIDE 0.9 %
500 INTRAVENOUS SOLUTION INTRAVENOUS ONCE
Status: COMPLETED | OUTPATIENT
Start: 2025-06-30 | End: 2025-06-30

## 2025-06-30 RX ORDER — ACETAMINOPHEN 650 MG/1
650 SUPPOSITORY RECTAL EVERY 6 HOURS PRN
Status: DISCONTINUED | OUTPATIENT
Start: 2025-06-30 | End: 2025-07-03 | Stop reason: HOSPADM

## 2025-06-30 RX ORDER — OXYCODONE HYDROCHLORIDE 5 MG/1
2.5 TABLET ORAL EVERY 4 HOURS PRN
Refills: 0 | Status: DISCONTINUED | OUTPATIENT
Start: 2025-06-30 | End: 2025-06-30

## 2025-06-30 RX ORDER — ACETAMINOPHEN 325 MG/1
650 TABLET ORAL EVERY 6 HOURS
Status: DISCONTINUED | OUTPATIENT
Start: 2025-06-30 | End: 2025-07-03 | Stop reason: HOSPADM

## 2025-06-30 RX ORDER — ONDANSETRON 2 MG/ML
4 INJECTION INTRAMUSCULAR; INTRAVENOUS ONCE
Status: COMPLETED | OUTPATIENT
Start: 2025-06-30 | End: 2025-06-30

## 2025-06-30 RX ORDER — OXYCODONE HYDROCHLORIDE 5 MG/1
5 TABLET ORAL EVERY 4 HOURS PRN
Refills: 0 | Status: DISCONTINUED | OUTPATIENT
Start: 2025-06-30 | End: 2025-06-30

## 2025-06-30 RX ORDER — FENTANYL CITRATE 50 UG/ML
100 INJECTION, SOLUTION INTRAMUSCULAR; INTRAVENOUS
Status: DISCONTINUED | OUTPATIENT
Start: 2025-06-30 | End: 2025-06-30 | Stop reason: HOSPADM

## 2025-06-30 RX ORDER — BUPIVACAINE HYDROCHLORIDE 5 MG/ML
INJECTION, SOLUTION EPIDURAL; INTRACAUDAL; PERINEURAL
Status: COMPLETED | OUTPATIENT
Start: 2025-06-30 | End: 2025-06-30

## 2025-06-30 RX ORDER — ONDANSETRON 4 MG/1
4 TABLET, ORALLY DISINTEGRATING ORAL EVERY 8 HOURS PRN
Status: DISCONTINUED | OUTPATIENT
Start: 2025-06-30 | End: 2025-07-03 | Stop reason: HOSPADM

## 2025-06-30 RX ORDER — ENOXAPARIN SODIUM 100 MG/ML
30 INJECTION SUBCUTANEOUS DAILY
Status: DISCONTINUED | OUTPATIENT
Start: 2025-07-01 | End: 2025-07-03 | Stop reason: HOSPADM

## 2025-06-30 RX ORDER — SODIUM CHLORIDE, SODIUM LACTATE, POTASSIUM CHLORIDE, CALCIUM CHLORIDE 600; 310; 30; 20 MG/100ML; MG/100ML; MG/100ML; MG/100ML
INJECTION, SOLUTION INTRAVENOUS CONTINUOUS
Status: DISCONTINUED | OUTPATIENT
Start: 2025-06-30 | End: 2025-07-01

## 2025-06-30 RX ORDER — HYDROMORPHONE HYDROCHLORIDE 1 MG/ML
1 INJECTION, SOLUTION INTRAMUSCULAR; INTRAVENOUS; SUBCUTANEOUS ONCE
Status: DISCONTINUED | OUTPATIENT
Start: 2025-06-30 | End: 2025-06-30

## 2025-06-30 RX ORDER — METOPROLOL SUCCINATE 50 MG/1
100 TABLET, EXTENDED RELEASE ORAL DAILY
Status: DISCONTINUED | OUTPATIENT
Start: 2025-06-30 | End: 2025-07-03 | Stop reason: HOSPADM

## 2025-06-30 RX ORDER — PHENYLEPHRINE HCL IN 0.9% NACL 0.4MG/10ML
SYRINGE (ML) INTRAVENOUS
Status: DISCONTINUED | OUTPATIENT
Start: 2025-06-30 | End: 2025-06-30 | Stop reason: SDUPTHER

## 2025-06-30 RX ORDER — QUETIAPINE FUMARATE 100 MG/1
100 TABLET, FILM COATED ORAL 2 TIMES DAILY
Status: ON HOLD | COMMUNITY
End: 2025-07-03 | Stop reason: HOSPADM

## 2025-06-30 RX ORDER — HYDRALAZINE HYDROCHLORIDE 10 MG/1
10 TABLET, FILM COATED ORAL EVERY 6 HOURS PRN
Status: DISCONTINUED | OUTPATIENT
Start: 2025-06-30 | End: 2025-07-03 | Stop reason: HOSPADM

## 2025-06-30 RX ORDER — MEPERIDINE HYDROCHLORIDE 25 MG/ML
12.5 INJECTION INTRAMUSCULAR; INTRAVENOUS; SUBCUTANEOUS ONCE
Status: COMPLETED | OUTPATIENT
Start: 2025-06-30 | End: 2025-06-30

## 2025-06-30 RX ADMIN — MEPERIDINE HYDROCHLORIDE 12.5 MG: 25 INJECTION INTRAMUSCULAR; INTRAVENOUS; SUBCUTANEOUS at 18:41

## 2025-06-30 RX ADMIN — SODIUM CHLORIDE, SODIUM LACTATE, POTASSIUM CHLORIDE, AND CALCIUM CHLORIDE: .6; .31; .03; .02 INJECTION, SOLUTION INTRAVENOUS at 18:00

## 2025-06-30 RX ADMIN — HYDROMORPHONE HYDROCHLORIDE 0.5 MG: 1 INJECTION, SOLUTION INTRAMUSCULAR; INTRAVENOUS; SUBCUTANEOUS at 12:08

## 2025-06-30 RX ADMIN — SODIUM CHLORIDE, PRESERVATIVE FREE 10 ML: 5 INJECTION INTRAVENOUS at 21:21

## 2025-06-30 RX ADMIN — HYDROMORPHONE HYDROCHLORIDE 0.25 MG: 1 INJECTION, SOLUTION INTRAMUSCULAR; INTRAVENOUS; SUBCUTANEOUS at 05:54

## 2025-06-30 RX ADMIN — BUPIVACAINE HYDROCHLORIDE 12 MG: 5 INJECTION, SOLUTION EPIDURAL; INTRACAUDAL; PERINEURAL at 15:58

## 2025-06-30 RX ADMIN — Medication 5 MG: at 20:22

## 2025-06-30 RX ADMIN — ONDANSETRON 4 MG: 2 INJECTION, SOLUTION INTRAMUSCULAR; INTRAVENOUS at 22:34

## 2025-06-30 RX ADMIN — PROPOFOL 50 MCG/KG/MIN: 10 INJECTION, EMULSION INTRAVENOUS at 16:30

## 2025-06-30 RX ADMIN — SODIUM CHLORIDE, SODIUM LACTATE, POTASSIUM CHLORIDE, AND CALCIUM CHLORIDE: .6; .31; .03; .02 INJECTION, SOLUTION INTRAVENOUS at 09:29

## 2025-06-30 RX ADMIN — Medication 120 MCG: at 16:53

## 2025-06-30 RX ADMIN — LABETALOL HYDROCHLORIDE 10 MG: 5 INJECTION, SOLUTION INTRAVENOUS at 18:40

## 2025-06-30 RX ADMIN — AMLODIPINE BESYLATE 10 MG: 5 TABLET ORAL at 19:00

## 2025-06-30 RX ADMIN — TRANEXAMIC ACID 1000 MG: 100 INJECTION, SOLUTION INTRAVENOUS at 16:44

## 2025-06-30 RX ADMIN — Medication 10 MG: at 16:36

## 2025-06-30 RX ADMIN — SODIUM CHLORIDE, SODIUM LACTATE, POTASSIUM CHLORIDE, AND CALCIUM CHLORIDE: .6; .31; .03; .02 INJECTION, SOLUTION INTRAVENOUS at 21:25

## 2025-06-30 RX ADMIN — Medication 5 MG: at 19:46

## 2025-06-30 RX ADMIN — CEFAZOLIN SODIUM 2000 MG: 1 POWDER, FOR SOLUTION INTRAMUSCULAR; INTRAVENOUS at 16:36

## 2025-06-30 RX ADMIN — HYDROMORPHONE HYDROCHLORIDE 0.5 MG: 1 INJECTION, SOLUTION INTRAMUSCULAR; INTRAVENOUS; SUBCUTANEOUS at 22:37

## 2025-06-30 RX ADMIN — HYDROMORPHONE HYDROCHLORIDE 2 MG: 2 TABLET ORAL at 21:20

## 2025-06-30 RX ADMIN — SODIUM CHLORIDE 500 ML: 0.9 INJECTION, SOLUTION INTRAVENOUS at 05:56

## 2025-06-30 RX ADMIN — FENTANYL CITRATE 25 MCG: 50 INJECTION, SOLUTION INTRAMUSCULAR; INTRAVENOUS at 15:56

## 2025-06-30 RX ADMIN — EPHEDRINE SULFATE 7.5 MG: 5 INJECTION INTRAVENOUS at 17:10

## 2025-06-30 RX ADMIN — SODIUM CHLORIDE, PRESERVATIVE FREE 10 ML: 5 INJECTION INTRAVENOUS at 08:23

## 2025-06-30 RX ADMIN — EPHEDRINE SULFATE 10 MG: 5 INJECTION INTRAVENOUS at 16:51

## 2025-06-30 RX ADMIN — Medication 10 MG: at 15:56

## 2025-06-30 RX ADMIN — METOPROLOL SUCCINATE 100 MG: 50 TABLET, EXTENDED RELEASE ORAL at 09:30

## 2025-06-30 RX ADMIN — HYDROMORPHONE HYDROCHLORIDE 0.5 MG: 1 INJECTION, SOLUTION INTRAMUSCULAR; INTRAVENOUS; SUBCUTANEOUS at 08:27

## 2025-06-30 RX ADMIN — ONDANSETRON 4 MG: 2 INJECTION, SOLUTION INTRAMUSCULAR; INTRAVENOUS at 14:18

## 2025-06-30 RX ADMIN — EPHEDRINE SULFATE 5 MG: 5 INJECTION INTRAVENOUS at 16:47

## 2025-06-30 RX ADMIN — HYDRALAZINE HYDROCHLORIDE 10 MG: 10 TABLET ORAL at 22:50

## 2025-06-30 RX ADMIN — HYDRALAZINE HYDROCHLORIDE 10 MG: 10 TABLET ORAL at 11:12

## 2025-06-30 RX ADMIN — Medication 5 MG: at 20:06

## 2025-06-30 RX ADMIN — EPHEDRINE SULFATE 10 MG: 5 INJECTION INTRAVENOUS at 17:07

## 2025-06-30 RX ADMIN — Medication 80 MCG: at 17:10

## 2025-06-30 RX ADMIN — ONDANSETRON 4 MG: 2 INJECTION, SOLUTION INTRAMUSCULAR; INTRAVENOUS at 05:56

## 2025-06-30 RX ADMIN — Medication 5 MG: at 19:56

## 2025-06-30 RX ADMIN — SODIUM CHLORIDE, PRESERVATIVE FREE 10 ML: 5 INJECTION INTRAVENOUS at 22:39

## 2025-06-30 ASSESSMENT — PAIN DESCRIPTION - ORIENTATION
ORIENTATION: RIGHT

## 2025-06-30 ASSESSMENT — PAIN DESCRIPTION - LOCATION
LOCATION: HIP
LOCATION: HEAD
LOCATION: HIP
LOCATION: HIP

## 2025-06-30 ASSESSMENT — LIFESTYLE VARIABLES
HOW OFTEN DO YOU HAVE A DRINK CONTAINING ALCOHOL: NEVER
HOW MANY STANDARD DRINKS CONTAINING ALCOHOL DO YOU HAVE ON A TYPICAL DAY: PATIENT DOES NOT DRINK

## 2025-06-30 ASSESSMENT — PAIN DESCRIPTION - DESCRIPTORS
DESCRIPTORS: THROBBING
DESCRIPTORS: ACHING
DESCRIPTORS: ACHING;PRESSURE

## 2025-06-30 ASSESSMENT — PAIN SCALES - GENERAL
PAINLEVEL_OUTOF10: 0
PAINLEVEL_OUTOF10: 10
PAINLEVEL_OUTOF10: 9
PAINLEVEL_OUTOF10: 8
PAINLEVEL_OUTOF10: 0
PAINLEVEL_OUTOF10: 7

## 2025-06-30 ASSESSMENT — PAIN DESCRIPTION - ONSET
ONSET: ON-GOING
ONSET: ON-GOING

## 2025-06-30 ASSESSMENT — PAIN DESCRIPTION - PAIN TYPE
TYPE: SURGICAL PAIN
TYPE: ACUTE PAIN
TYPE: SURGICAL PAIN

## 2025-06-30 ASSESSMENT — PAIN - FUNCTIONAL ASSESSMENT
PAIN_FUNCTIONAL_ASSESSMENT: 0-10
PAIN_FUNCTIONAL_ASSESSMENT: PREVENTS OR INTERFERES SOME ACTIVE ACTIVITIES AND ADLS
PAIN_FUNCTIONAL_ASSESSMENT: 0-10
PAIN_FUNCTIONAL_ASSESSMENT: INTOLERABLE, UNABLE TO DO ANY ACTIVE OR PASSIVE ACTIVITIES

## 2025-06-30 ASSESSMENT — PAIN DESCRIPTION - FREQUENCY
FREQUENCY: INTERMITTENT

## 2025-06-30 ASSESSMENT — ENCOUNTER SYMPTOMS
ABDOMINAL PAIN: 0
BACK PAIN: 0
SHORTNESS OF BREATH: 0
SHORTNESS OF BREATH: 1

## 2025-06-30 ASSESSMENT — PAIN SCALES - WONG BAKER: WONGBAKER_NUMERICALRESPONSE: NO HURT

## 2025-06-30 NOTE — ANESTHESIA POSTPROCEDURE EVALUATION
Department of Anesthesiology  Postprocedure Note    Patient: Maya Cantu  MRN: [id number]  YOB: 1940  Date of evaluation: 6/30/2025    Procedure Summary       Date: 06/30/25 Room / Location: [facility] MAIN OR  / [facility] MAIN OR    Anesthesia Start: 1621 Anesthesia Stop: 1752    Procedure: RIGHT HIP HEMIARTHROPLASTY ANTERIOR APPROACH (Right: Hip) Diagnosis:       Closed fracture of right hip, initial encounter ([facility])      (Closed fracture of right hip, initial encounter ([facility]) [S72.001A])    Surgeons: Robert Avery MD Responsible Provider: Lisandro Zhang MD    Anesthesia Type: spinal ASA Status: 3            Anesthesia Type: No value filed.    Oanh Phase I: Oanh Score: 9    Oanh Phase II:      Anesthesia Post Evaluation    Patient location during evaluation: PACU  Patient participation: complete - patient participated  Level of consciousness: awake  Pain score: 0  Airway patency: patent  Nausea & Vomiting: no nausea and no vomiting  Cardiovascular status: blood pressure returned to baseline  Respiratory status: acceptable  Hydration status: euvolemic  Multimodal analgesia pain management approach  Pain management: adequate    No notable events documented.

## 2025-06-30 NOTE — H&P
Hospitalist Admission Note    NAME:  Maya Cantu   :  1940   MRN:  [id number]     Date/Time:  2025 3:11 PM    Patient PCP: Reuben Millard MD  ________________________________________________________________________    Given the patient's current clinical presentation, I have a high level of concern for decompensation if discharged from the emergency department.  Complex decision making was performed, which includes reviewing the patient's available past medical records, laboratory results, and x-ray films.       My assessment of this patient's clinical condition and my plan of care is as follows.    Assessment / Plan:    Maya is a 85 y.o. female with PMHx HTN, CKD, insomnia, anemia who presented 25 for ground level mechanical fall with subsequent R hip pain.     Ground level fall  Acute displaced right femoral neck fracture   - Fracture confirmed on imaging   - Ortho consulted   - Pain control   - PT/OT   - NPO pending surgery eval, IVF    AHRF  - Placed on 1.5L NC. XR on admission showed Perihilar and bibasilar atelectasis or interstitial infiltrate left greater than right.  - No infectious symptoms, suspect atelectasis due to fx/pain  - Repeat CXR in the AM  - Wean O2 as tolerated   - Incentive spirometry     Anemia   - Hb 11.0 on admission, at/better than baseline   - Check serologies   - Monitor. Would expect some decline in Hb with the IVF& fx    CKD   - Cr on admission 1.38, baseline ~1.1 - 1.4  - At baseline, monitor     Right frontal scalp hematoma   - CT head/C spine with no other acute findings   - Supportive care    Hypertension   - Very labile BP at home (90s - 200s systolic).   - Uncontrolled here, suspect pain contributing   - Continue Metoprolol 100 mg (was only taking 1/3 tab at home, but not reasonable to break into thirds).   - PO hydralazine PRN, avoid IV medications in absence of HTN emergency   - Will likely need more liberal goal (140s - 150s) given age, fall risk,

## 2025-06-30 NOTE — ANESTHESIA PROCEDURE NOTES
Spinal Block    Patient location during procedure: pre-op  End time: 6/30/2025 4:13 PM  Reason for block: post-op pain management, primary anesthetic and at surgeon's request  Staffing  Performed: anesthesiologist   Performed by: Micah Beck MD  Authorized by: Micah Beck MD    Spinal Block  Patient position: left lateral decubitus  Prep: DuraPrep  Patient monitoring: cardiac monitor, continuous pulse ox, frequent blood pressure checks and oxygen  Approach: midline  Location: L2/L3  Provider prep: mask and sterile gloves  Local infiltration: lidocaine  Needle  Needle type: Pencan   Needle gauge: 22 G  Needle length: 3.5 in  Assessment  Swirl obtained: Yes  CSF: clear  Hemodynamics: stable  Preanesthetic Checklist  Completed: patient identified, IV checked, site marked, risks and benefits discussed, surgical/procedural consents, pre-op evaluation, timeout performed, anesthesia consent given, oxygen available and monitors applied/VS acknowledged

## 2025-06-30 NOTE — ED PROVIDER NOTES
[facility] EMERGENCY DEPARTMENT  EMERGENCY DEPARTMENT ENCOUNTER      Pt Name: Maya Cantu  MRN: [id number]  Birthdate 1940  Date of evaluation: 6/30/2025  Provider: Alan Hart DO    CHIEF COMPLAINT       Chief Complaint   Patient presents with    Hip Pain     Right hip pain post ground level fall. Fell after getting off of the commode.         HISTORY OF PRESENT ILLNESS   (Location/Symptom, Timing/Onset, Context/Setting, Quality, Duration, Modifying Factors, Severity)  Note limiting factors.   85-year-old female with history of multiple myeloma presents ED after mechanical fall patient with To get the bathroom and tripped fell struck her head and landed on her right hip, has severe right hip pain, has a hematoma to the right frontal portion of her forehead.  No neck pain no back pain no chest or abdomen pain.  History of multiple myeloma, no anticoagulant use.            Review of External Medical Records:     Nursing Notes were reviewed.    REVIEW OF SYSTEMS    (2-9 systems for level 4, 10 or more for level 5)     Review of Systems   Respiratory:  Negative for shortness of breath.    Cardiovascular:  Negative for chest pain.   Gastrointestinal:  Negative for abdominal pain.   Musculoskeletal:  Negative for back pain.   Neurological:  Positive for headaches. Negative for weakness and numbness.       Except as noted above the remainder of the review of systems was reviewed and negative.       PAST MEDICAL HISTORY     Past Medical History:   Diagnosis Date    Anemia     Autoimmune disease     MGUS    smoldering myloma    Cancer (HCC) 2015    Mass on lung.  surgically removed.    Hx of completed stroke     Unsure of date. Years ago.    Hx of mammogram 03/08/2016    normal    Hypertension     Monoclonal gammopathies     Pap smear for cervical cancer screening 04/25/2013    negative    Stomach ulcer          SURGICAL HISTORY       Past Surgical History:   Procedure Laterality Date

## 2025-06-30 NOTE — ANESTHESIA PRE PROCEDURE
Department of Anesthesiology  Preprocedure Note       Name:  Maya Cantu   Age:  85 y.o.  :  1940                                          MRN:  [id number]         Date:  2025      Surgeon: Surgeon(s):  Robert Avery MD    Procedure: Procedure(s):  RIGHT HIP HEMIARTHROPLASTY ANTERIOR APPROACH    Medications prior to admission:   Prior to Admission medications    Medication Sig Start Date End Date Taking? Authorizing Provider   DULoxetine (CYMBALTA) 30 MG extended release capsule  22  Yes Automatic Reconciliation, Ar   metoprolol succinate (TOPROL XL) 100 MG extended release tablet TAKE 1/3 TABLET BY MOUTH DAILY 9/10/24   ProviderBraden MD   folic acid (FOLVITE) 1 MG tablet Take 1 tablet by mouth daily 24   Braden Woods MD   ibuprofen (ADVIL;MOTRIN) 200 MG tablet Take 1 tablet by mouth    Braden Woods MD   pantoprazole (PROTONIX) 40 MG tablet Take 1 tablet by mouth in the morning and at bedtime  Patient taking differently: Take 1 tablet by mouth daily 10/12/23   Adan Marie MD   estradiol (ESTRACE) 0.1 MG/GM vaginal cream APPLY 1/2 GRAM INTO IN THE VAGINA 3 DAYS A WEEK AS DIRECTED 23   ProviderBraden MD   amLODIPine (NORVASC) 10 MG tablet Take by mouth daily  Patient not taking: Reported on 2025   Automatic Reconciliation, Ar   vitamin E 1000 units capsule Take by mouth daily    Automatic Reconciliation, Ar   zolpidem (AMBIEN) 10 MG tablet Take by mouth daily. 18   Automatic Reconciliation, Ar       Current medications:    Current Facility-Administered Medications   Medication Dose Route Frequency Provider Last Rate Last Admin   • sodium chloride flush 0.9 % injection 5-40 mL  5-40 mL IntraVENous 2 times per day Darin Antunez MD   10 mL at 25 0823   • sodium chloride flush 0.9 % injection 5-40 mL  5-40 mL IntraVENous PRN Darin Antunez MD       • 0.9 % sodium chloride infusion   IntraVENous PRN Darin Antunez MD       • potassium

## 2025-06-30 NOTE — CONSULTS
SUBJECTIVE:     Maya Cantu is a 85 y.o. female  evaluated for right hip pain. Their mechanism of injury was a fall this morning. The patient localizes their pain to the right hip/groin area. Intensity is noted to be severe with any movement. This injury occured as a result of a ground level fall.  Other concerns include the severity of the pain and her pre-injury relatively high level of function.    Past Medical History :   Past Medical History:   Diagnosis Date    Anemia     Autoimmune disease     MGUS    smoldering myloma    Cancer (HCC) 2015    Mass on lung.  surgically removed.    Hx of completed stroke     Unsure of date. Years ago.    Hx of mammogram 03/08/2016    normal    Hypertension     Monoclonal gammopathies     Pap smear for cervical cancer screening 04/25/2013    negative    Stomach ulcer        Past Surgical History :   Past Surgical History:   Procedure Laterality Date    BREAST SURGERY  1982    LEFT breast lump    CT BIOPSY BONE MARROW  8/6/2018    CT BONE MARROW BIOPSY 8/6/2018 [facility] RAD CT    CT BIOPSY BONE MARROW  9/30/2022    CT BONE MARROW BIOPSY 9/30/2022    HEENT      Lens implants    HEMORRHOID SURGERY  1989    OTHER SURGICAL HISTORY Right 09/2015    right upper lobe of lung removed due to cancer    TONSILLECTOMY  1962    TUBAL LIGATION  1969    UPPER GASTROINTESTINAL ENDOSCOPY N/A 10/11/2023    EGD ESOPHAGOGASTRODUODENOSCOPY performed by Twan Scott MD at [facility] ENDOSCOPY    UPPER GASTROINTESTINAL ENDOSCOPY N/A 10/11/2023    EGD BIOPSY performed by Twan Scott MD at [facility] ENDOSCOPY    WRIST FRACTURE SURGERY  1970    \"cyst in wrist\"        Medications :  See med reconciliation    Allergies :   Corticosteroids, Losartan, Statins, and Codeine     Social History :  Social History     Socioeconomic History    Marital status:      Spouse name: Not on file    Number of children: Not on file    Years of education: Not on file    Highest education level: Not on file   Occupational

## 2025-06-30 NOTE — ED TRIAGE NOTES
Right hip pain post ground level fall. Fell after getting off of the commode.  No LOC did hit head. Goose egg on right side of head. Hx of Hypertension and lung cancer/multiple myeloma. 7/10 pain at rest.

## 2025-06-30 NOTE — ED NOTES
TRANSFER - OUT REPORT:    Verbal report given to Shanika on Maya Cantu  being transferred to [facility] for routine progression of patient care       Report consisted of patient's Situation, Background, Assessment and   Recommendations(SBAR).     Information from the following report(s) Nurse Handoff Report, Index, and ED SBAR was reviewed with the receiving nurse.    Romeo Fall Assessment:    Presents to emergency department  because of falls (Syncope, seizure, or loss of consciousness): No  Age > 70: Yes  Altered Mental Status, Intoxication with alcohol or substance confusion (Disorientation, impaired judgment, poor safety awaremess, or inability to follow instructions): No  Impaired Mobility: Ambulates or transfers with assistive devices or assistance; Unable to ambulate or transer.: Yes  Nursing Judgement: Yes          Lines:   Peripheral IV 06/30/25 Posterior;Right Forearm (Active)       Peripheral IV 06/30/25 Left Antecubital (Active)   Site Assessment Clean, dry & intact 06/30/25 0723   Line Status Blood return noted 06/30/25 0723   Line Care Cap changed 06/30/25 0723   Phlebitis Assessment No symptoms 06/30/25 0723   Infiltration Assessment 0 06/30/25 0723   Dressing Status New dressing applied 06/30/25 0723   Dressing Type Transparent 06/30/25 0723        Opportunity for questions and clarification was provided.      Patient transported with:  Registered Nurse and Tech

## 2025-06-30 NOTE — BRIEF OP NOTE
Brief Postoperative Note      Patient: Maya Cantu  YOB: 1940  MRN: [id number]    Date of Procedure: 6/30/2025    Pre-Op Diagnosis Codes:      * Closed fracture of right hip, initial encounter ([facility]) [S72.001A]    Post-Op Diagnosis: Same       Procedure(s):  RIGHT HIP HEMIARTHROPLASTY ANTERIOR APPROACH    Surgeon(s):  Robert Avery MD    Assistant:  Surgical Assistant: Re Moon Ty    Anesthesia: General    Estimated Blood Loss (mL): 200     Complications: None    Specimens:   * No specimens in log *    Implants:  Implant Name Type Inv. Item Serial No.  Lot No. LRB No. Used Action   STEM FEM SZ 4 L103MM 12/14 TAPR STD OFFSET HIP DUOFIX CLLRD - SN/A  STEM FEM SZ 4 L103MM 12/14 TAPR STD OFFSET HIP DUOFIX CLLRD N/A [employer] [id number] Right 1 Implanted   HEAD FEM [id number] NK L+1.5MM HIP MTL ON MTL 12/14 TAPR - SN/A  HEAD FEM [id number] NK L+1.5MM HIP MTL ON MTL 12/14 TAPR N/A [employer] [id number] Right 1 Implanted   HEAD BPLR OD45MM ID28MM FEM HIP SELF CNTR - SN/A  HEAD BPLR OD45MM ID28MM FEM HIP SELF CNTR N/A [employer] [id number] Right 1 Implanted         Drains: * No LDAs found *    Findings:  Infection Present At Time Of Surgery (PATOS) (choose all levels that have infection present):  No infection present  Other Findings: displaced fracture    Electronically signed by Robert Avery MD on 6/30/2025 at 5:15 PM

## 2025-06-30 NOTE — ED NOTES
Pt's SpO2: 85% on RA while resting. Patient placed on 2 L NC. SpO2: 99% on 2 L. Patient is alert and no sign of distress noted. Minnie GARCIA notified.

## 2025-06-30 NOTE — OP NOTE
OPERATIVE REPORT    FACILITY: [place]    PATIENT NAME: Maya Cantu     DATE OF OPERATION: 6/30/2025    PREOPERATIVE DIAGNOSIS: Right hip femoral neck fracture    POSTOPERATIVE DIAGNOSIS: same    OPERATIVE PROCEDURE:   1. Open treatment of right femoral neck fracture with hip hemiarthroplasty, CPT 73347  2. Fluoroscopy, directed by and interpreted by surgeon - CPT 64612    ATTENDING PHYSICIAN: Robert Avery MD    ASSISTANT: Konrad Garcia SA    JUSTIFICATION FOR SURGICAL ASSISTANT:   Please note that the surgical assistant listed above (Konrad Garcia SA) was required and necessary for the completion of this case to assist with limb positioning, soft tissue retraction, equipment management, implant insertion, and wound closure.     IMPLANTS:    Implant Name Type Inv. Item Serial No.  Lot No. LRB No. Used Action   STEM FEM SZ 4 L103MM 12/14 TAPR STD OFFSET HIP DUOFIX CLLRD - SN/A  STEM FEM SZ 4 L103MM 12/14 TAPR STD OFFSET HIP DUOFIX CLLRD N/A [employer] [id number] Right 1 Implanted   HEAD FEM [id number] NK L+1.5MM HIP MTL ON MTL 12/14 TAPR - SN/A  HEAD FEM [id number] NK L+1.5MM HIP MTL ON MTL 12/14 TAPR N/A [employer] [id number] Right 1 Implanted   HEAD BPLR OD45MM ID28MM FEM HIP SELF CNTR - SN/A  HEAD BPLR OD45MM ID28MM FEM HIP SELF CNTR N/A [employer] U [employer] - Smartphone [employer]ra[employer]oat [id number] Right 1 Implanted       SPECIMENS:  None    OPERATIVE FINDINGS: Displaced femoral neck fracture, clinical osteopenia    ANESTHESIA: Spinal + conscious sedation    FLUIDS:  Please see anesthesia record    ESTIMATED BLOOD LOSS: 200cc    INDICATIONS FOR PROCEDURE:   This patient is a 85 y.o. female who presented to our institution after a fall. I outlined risks involved with the operation, including, but not limited to bleeding, infection, damage to neurovascular structures, limb length discrepancy, persistent limp, need for blood transfusion, fracture, hardware failure, dislocation, and

## 2025-06-30 NOTE — CARE COORDINATION
6/30/2025  9:20 AM      Care Management Initial Assessment  6/30/2025 9:20 AM  If patient is discharged prior to next notation, then this note serves as note for discharge by case management.    Reason for Admission:   Closed fracture of neck of right femur, initial encounter ([facility]) [S72.001A]         Patient Admission Status: Inpatient  Date Admitted to IN: 6/30/25  RUR: Readmission Risk Score: 13.1    Hospitalization in the last 30 days (Readmission):  No        Advance Care Planning:  Code Status: Full Code  Primary Healthcare Decision Maker: (P) Legal Next of Kin (Lawson Darling (ARMANI) [phone])   Advance Directive: has an advanced directive - a copy HAS NOT been provided.     __________________________________________________________________________  Assessment:      06/30/25 0914   Service Assessment   Patient Orientation Alert and Oriented   Cognition Alert   History Provided By Child/Family;Patient   Primary Caregiver Self   Support Systems Children;Family Members   Patient's Healthcare Decision Maker is: Legal Next of Kin  (Lawson Darling (ARMANI) [phone])   PCP Verified by CM Yes  (Reuben Millard MD)   Last Visit to PCP Within last 3 months   Prior Functional Level Independent in ADLs/IADLs;Assistance with the following:;Cooking;Housework;Shopping  (Family assists w/ iADLS PRN)   Current Functional Level Other (see comment)  (PT, OT evals pending)   Can patient return to prior living arrangement Unknown at present   Ability to make needs known: Good   Family able to assist with home care needs: Yes   Financial Resources Medicare;Other (Comment)  (Medicare, )   Community Resources None   Social/Functional History   Lives With Family;Son   Type of Home House   Home Layout Two level;Bed/Bath upstairs   Home Access Stairs to enter with rails   Entrance Stairs - Number of Steps 4 through garage or front porch   Bathroom Shower/Tub Tub/Shower unit   Bathroom Toilet Standard   Bathroom Equipment None   Home

## 2025-07-01 ENCOUNTER — APPOINTMENT (OUTPATIENT)
Facility: HOSPITAL | Age: 85
DRG: 522 | End: 2025-07-01
Payer: MEDICARE

## 2025-07-01 LAB
25(OH)D3 SERPL-MCNC: 31.4 NG/ML (ref 30–100)
ANION GAP SERPL CALC-SCNC: 6 MMOL/L (ref 2–12)
BASOPHILS # BLD: 0.02 K/UL (ref 0–0.1)
BASOPHILS NFR BLD: 0.3 % (ref 0–1)
BUN SERPL-MCNC: 12 MG/DL (ref 6–20)
BUN/CREAT SERPL: 10 (ref 12–20)
CALCIUM SERPL-MCNC: 8.7 MG/DL (ref 8.5–10.1)
CHLORIDE SERPL-SCNC: 104 MMOL/L (ref 97–108)
CO2 SERPL-SCNC: 25 MMOL/L (ref 21–32)
CREAT SERPL-MCNC: 1.15 MG/DL (ref 0.55–1.02)
DIFFERENTIAL METHOD BLD: ABNORMAL
EKG ATRIAL RATE: 73 BPM
EKG DIAGNOSIS: NORMAL
EKG P AXIS: 76 DEGREES
EKG P-R INTERVAL: 184 MS
EKG Q-T INTERVAL: 412 MS
EKG QRS DURATION: 90 MS
EKG QTC CALCULATION (BAZETT): 453 MS
EKG R AXIS: 45 DEGREES
EKG T AXIS: 12 DEGREES
EKG VENTRICULAR RATE: 73 BPM
EOSINOPHIL # BLD: 0.04 K/UL (ref 0–0.4)
EOSINOPHIL NFR BLD: 0.7 % (ref 0–7)
ERYTHROCYTE [DISTWIDTH] IN BLOOD BY AUTOMATED COUNT: 14.6 % (ref 11.5–14.5)
FERRITIN SERPL-MCNC: 76 NG/ML (ref 26–388)
FOLATE SERPL-MCNC: 17.2 NG/ML (ref 5–21)
GLUCOSE SERPL-MCNC: 129 MG/DL (ref 65–100)
HCT VFR BLD AUTO: 27.3 % (ref 35–47)
HGB BLD-MCNC: 9.2 G/DL (ref 11.5–16)
IMM GRANULOCYTES # BLD AUTO: 0.02 K/UL (ref 0–0.04)
IMM GRANULOCYTES NFR BLD AUTO: 0.3 % (ref 0–0.5)
IRON SATN MFR SERPL: 20 % (ref 20–50)
IRON SERPL-MCNC: 61 UG/DL (ref 35–150)
LYMPHOCYTES # BLD: 1.18 K/UL (ref 0.8–3.5)
LYMPHOCYTES NFR BLD: 19.2 % (ref 12–49)
MCH RBC QN AUTO: 32.5 PG (ref 26–34)
MCHC RBC AUTO-ENTMCNC: 33.7 G/DL (ref 30–36.5)
MCV RBC AUTO: 96.5 FL (ref 80–99)
MONOCYTES # BLD: 0.2 K/UL (ref 0–1)
MONOCYTES NFR BLD: 3.3 % (ref 5–13)
NEUTS SEG # BLD: 4.69 K/UL (ref 1.8–8)
NEUTS SEG NFR BLD: 76.2 % (ref 32–75)
NRBC # BLD: 0 K/UL (ref 0–0.01)
NRBC BLD-RTO: 0 PER 100 WBC
PLATELET # BLD AUTO: 144 K/UL (ref 150–400)
PMV BLD AUTO: 10.8 FL (ref 8.9–12.9)
POTASSIUM SERPL-SCNC: 3.5 MMOL/L (ref 3.5–5.1)
RBC # BLD AUTO: 2.83 M/UL (ref 3.8–5.2)
SODIUM SERPL-SCNC: 135 MMOL/L (ref 136–145)
TIBC SERPL-MCNC: 299 UG/DL (ref 250–450)
VIT B12 SERPL-MCNC: 598 PG/ML (ref 193–986)
WBC # BLD AUTO: 6.2 K/UL (ref 3.6–11)

## 2025-07-01 PROCEDURE — 82728 ASSAY OF FERRITIN: CPT

## 2025-07-01 PROCEDURE — 97116 GAIT TRAINING THERAPY: CPT | Performed by: PHYSICAL THERAPIST

## 2025-07-01 PROCEDURE — 97161 PT EVAL LOW COMPLEX 20 MIN: CPT | Performed by: PHYSICAL THERAPIST

## 2025-07-01 PROCEDURE — 1100000000 HC RM PRIVATE

## 2025-07-01 PROCEDURE — 83540 ASSAY OF IRON: CPT

## 2025-07-01 PROCEDURE — 97535 SELF CARE MNGMENT TRAINING: CPT | Performed by: OCCUPATIONAL THERAPIST

## 2025-07-01 PROCEDURE — 93010 ELECTROCARDIOGRAM REPORT: CPT | Performed by: SPECIALIST

## 2025-07-01 PROCEDURE — 97530 THERAPEUTIC ACTIVITIES: CPT | Performed by: PHYSICAL THERAPIST

## 2025-07-01 PROCEDURE — 36415 COLL VENOUS BLD VENIPUNCTURE: CPT

## 2025-07-01 PROCEDURE — 80048 BASIC METABOLIC PNL TOTAL CA: CPT

## 2025-07-01 PROCEDURE — 82746 ASSAY OF FOLIC ACID SERUM: CPT

## 2025-07-01 PROCEDURE — 82306 VITAMIN D 25 HYDROXY: CPT

## 2025-07-01 PROCEDURE — 82607 VITAMIN B-12: CPT

## 2025-07-01 PROCEDURE — 6370000000 HC RX 637 (ALT 250 FOR IP): Performed by: ORTHOPAEDIC SURGERY

## 2025-07-01 PROCEDURE — 83550 IRON BINDING TEST: CPT

## 2025-07-01 PROCEDURE — 94761 N-INVAS EAR/PLS OXIMETRY MLT: CPT

## 2025-07-01 PROCEDURE — 97165 OT EVAL LOW COMPLEX 30 MIN: CPT | Performed by: OCCUPATIONAL THERAPIST

## 2025-07-01 PROCEDURE — 2500000003 HC RX 250 WO HCPCS: Performed by: ORTHOPAEDIC SURGERY

## 2025-07-01 PROCEDURE — 71045 X-RAY EXAM CHEST 1 VIEW: CPT

## 2025-07-01 PROCEDURE — 6360000002 HC RX W HCPCS: Performed by: ORTHOPAEDIC SURGERY

## 2025-07-01 PROCEDURE — 6360000002 HC RX W HCPCS

## 2025-07-01 PROCEDURE — 85025 COMPLETE CBC W/AUTO DIFF WBC: CPT

## 2025-07-01 RX ORDER — PROCHLORPERAZINE EDISYLATE 5 MG/ML
5 INJECTION INTRAMUSCULAR; INTRAVENOUS ONCE
Status: COMPLETED | OUTPATIENT
Start: 2025-07-01 | End: 2025-07-01

## 2025-07-01 RX ADMIN — ACETAMINOPHEN 650 MG: 325 TABLET ORAL at 02:37

## 2025-07-01 RX ADMIN — AMLODIPINE BESYLATE 10 MG: 5 TABLET ORAL at 09:23

## 2025-07-01 RX ADMIN — ENOXAPARIN SODIUM 30 MG: 100 INJECTION SUBCUTANEOUS at 09:25

## 2025-07-01 RX ADMIN — HYDROMORPHONE HYDROCHLORIDE 2 MG: 2 TABLET ORAL at 14:11

## 2025-07-01 RX ADMIN — PROCHLORPERAZINE EDISYLATE 5 MG: 5 INJECTION INTRAMUSCULAR; INTRAVENOUS at 02:28

## 2025-07-01 RX ADMIN — METOPROLOL SUCCINATE 100 MG: 50 TABLET, EXTENDED RELEASE ORAL at 09:42

## 2025-07-01 RX ADMIN — WATER 2000 MG: 1 INJECTION INTRAMUSCULAR; INTRAVENOUS; SUBCUTANEOUS at 09:23

## 2025-07-01 RX ADMIN — ACETAMINOPHEN 650 MG: 325 TABLET ORAL at 09:22

## 2025-07-01 RX ADMIN — HYDRALAZINE HYDROCHLORIDE 10 MG: 10 TABLET ORAL at 04:32

## 2025-07-01 RX ADMIN — DULOXETINE 60 MG: 30 CAPSULE, DELAYED RELEASE ORAL at 09:23

## 2025-07-01 RX ADMIN — PANTOPRAZOLE SODIUM 40 MG: 40 TABLET, DELAYED RELEASE ORAL at 05:44

## 2025-07-01 RX ADMIN — HYDROMORPHONE HYDROCHLORIDE 0.5 MG: 1 INJECTION, SOLUTION INTRAMUSCULAR; INTRAVENOUS; SUBCUTANEOUS at 02:28

## 2025-07-01 RX ADMIN — WATER 2000 MG: 1 INJECTION INTRAMUSCULAR; INTRAVENOUS; SUBCUTANEOUS at 00:37

## 2025-07-01 RX ADMIN — SODIUM CHLORIDE, PRESERVATIVE FREE 10 ML: 5 INJECTION INTRAVENOUS at 20:47

## 2025-07-01 RX ADMIN — ACETAMINOPHEN 650 MG: 325 TABLET ORAL at 20:45

## 2025-07-01 ASSESSMENT — PAIN DESCRIPTION - LOCATION
LOCATION: HIP

## 2025-07-01 ASSESSMENT — PAIN SCALES - GENERAL
PAINLEVEL_OUTOF10: 2
PAINLEVEL_OUTOF10: 2
PAINLEVEL_OUTOF10: 5
PAINLEVEL_OUTOF10: 8
PAINLEVEL_OUTOF10: 5

## 2025-07-01 ASSESSMENT — PAIN DESCRIPTION - ORIENTATION
ORIENTATION: RIGHT

## 2025-07-01 ASSESSMENT — PAIN DESCRIPTION - DESCRIPTORS
DESCRIPTORS: SHARP;SHOOTING;ACHING
DESCRIPTORS: ACHING
DESCRIPTORS: ACHING;SORE

## 2025-07-01 ASSESSMENT — PAIN - FUNCTIONAL ASSESSMENT: PAIN_FUNCTIONAL_ASSESSMENT: ACTIVITIES ARE NOT PREVENTED

## 2025-07-01 ASSESSMENT — PAIN SCALES - WONG BAKER: WONGBAKER_NUMERICALRESPONSE: NO HURT

## 2025-07-01 NOTE — CARE COORDINATION
7/1/2025  4:10 PM  Care Management Progress Note    Reason for Admission:   Closed fracture of neck of right femur, initial encounter ([facility]) [S72.001A]  Traumatic hematoma of head, initial encounter [S00.93XA]  Procedure(s) (LRB):  RIGHT HIP HEMIARTHROPLASTY ANTERIOR APPROACH (Right)  1 Day Post-Op    Patient Admission Status: Inpatient  Date Admitted to INP: 7/1/25 []NA - OBS/Outpatient  RUR: Readmission Risk Score: 14.7    Hospitalization in the last 30 days (Readmission):  No        Transition of care plan:  Awaiting medical clearance and DC order. Therapy following. Ortho following.   Home with HH - CM noted therapy reqs for HH services. Awaiting order.   Date IM given: 6/30/25 []NA  Outpatient follow-up.  Discharge transport: Family    CM consult for walker noted. Freedom of Choice offered, and pt preference indicated as [facility]. Referral submitted via [facility], and they accepted.   DME delivered by CM: [] Yes, invoice attached in AllScripts [x] DME delivered by provider        06/30/25 0940   Service Assessment   Patient Orientation Alert and Oriented   Cognition Alert   History Provided By Child/Family;Patient   Primary Caregiver Self   Support Systems Children;Family Members   Patient's Healthcare Decision Maker is: Legal Next of Kin  (Lawson Darling (C) [phone])   PCP Verified by CM Yes  (Reuben Millard MD)   Last Visit to PCP Within last 3 months   Prior Functional Level Independent in ADLs/IADLs;Assistance with the following:;Cooking;Housework;Shopping  (Family assists w/ iADLS PRN)   Current Functional Level Other (see comment)  (PT, OT cleo pending)   Can patient return to prior living arrangement Unknown at present   Ability to make needs known: Good   Family able to assist with home care needs: Yes   Financial Resources Medicare;Other (Comment)  (Medicare, )   Community Resources None   Social/Functional History   Lives With Family;Son   Type of Home House   Home Layout Two level;Bed/Bath

## 2025-07-01 NOTE — PLAN OF CARE
Problem: Occupational Therapy - Adult  Goal: By Discharge: Performs self-care activities at highest level of function for planned discharge setting.  See evaluation for individualized goals.  Description: FUNCTIONAL STATUS PRIOR TO ADMISSION:  Pt was independent ADLs, family assisting with IADLs PRN, pt was driving, and ambulating without AD  Receives Help From: Family, Prior Level of Assist for ADLs: Independent,  ,  ,  ,  ,  , Prior Level of Assist for Homemaking: Independent (Family assists w/ cooking, cleaning,laundry PRN), Ambulation Assistance: Independent, Prior Level of Assist for Transfers: Independent, Active : Yes (Family can provide transportation)     HOME SUPPORT: Patient lived with family but didn't require assistance.    Occupational Therapy Goals:  Initiated 7/1/2025  1.  Patient will perform grooming standing at sink with [place] within 7 day(s).  2.  Patient will perform lower body dressing with use of AE Supervision within 7 day(s).  3.  Patient will perform bathing with Supervision within 7 day(s).  4.  Patient will perform toilet transfers with Supervision  within 7 day(s).  5.  Patient will perform all aspects of toileting with Supervision within 7 day(s).  6.  Patient will participate in upper extremity therapeutic exercise/activities with [place] for 5 minutes within 7 day(s).    7.  Patient will utilize energy conservation techniques during functional activities with verbal cues within 7 day(s).    Outcome: Progressing   OCCUPATIONAL THERAPY EVALUATION    Patient: Maya Cantu (85 y.o. female)  Date: 7/1/2025  Primary Diagnosis: Closed fracture of neck of right femur, initial encounter ([facility]) [S72.001A]  Traumatic hematoma of head, initial encounter [S00.93XA]  Procedure(s) (LRB):  RIGHT HIP HEMIARTHROPLASTY ANTERIOR APPROACH (Right) 1 Day Post-Op     Precautions:                    ASSESSMENT :  Pt presents on admission following GLF now s/p R hemiarthroplasty, POD #1,

## 2025-07-01 NOTE — PLAN OF CARE
Problem: Physical Therapy - Adult  Goal: By Discharge: Performs mobility at highest level of function for planned discharge setting.  See evaluation for individualized goals.  Description: FUNCTIONAL STATUS PRIOR TO ADMISSION: Patient was independent and active without use of DME.    HOME SUPPORT PRIOR TO ADMISSION: The patient lived with extended family to include grandchildren that are home from college for summer and can assist.    Physical Therapy Goals  Initiated 7/1/2025  1.  Patient will move from supine to sit and sit to supine in bed with modified independence within 7 day(s).    2.  Patient will perform sit to stand with modified independence within 7 day(s).  3.  Patient will transfer from bed to chair and chair to bed with modified independence using the least restrictive device within 7 day(s).  4.  Patient will ambulate with modified independence for 150 feet with the least restrictive device within 7 day(s).   5.  Patient will ascend/descend 4 stairs with 1 handrail(s) with contact guard assist within 7 day(s).  6.  Patient will perform ALEXA home exercise program per protocol with supervision/set-up within 7 days.  7. Patient will verbalize and demonstrate understanding of posterior hip precautions per protocol within 4 days.    Outcome: Progressing   PHYSICAL THERAPY EVALUATION    Patient: Maya Cantu (85 y.o. female)  Date: 7/1/2025  Primary Diagnosis: Closed fracture of neck of right femur, initial encounter ([facility]) [S72.001A]  Traumatic hematoma of head, initial encounter [S00.93XA]  Procedure(s) (LRB):  RIGHT HIP HEMIARTHROPLASTY ANTERIOR APPROACH (Right) 1 Day Post-Op   Precautions: Restrictions/Precautions  Restrictions/Precautions: Fall Risk     Position Activity Restriction  Hip Precautions: Posterior hip precautions      ASSESSMENT :   DEFICITS/IMPAIRMENTS:   The patient is limited by pain, impaired balance, gait instability, generalized weakness and decreased activity tolerance.  Overall

## 2025-07-01 NOTE — PLAN OF CARE
Problem: Physical Therapy - Adult  Goal: By Discharge: Performs mobility at highest level of function for planned discharge setting.  See evaluation for individualized goals.  Description: FUNCTIONAL STATUS PRIOR TO ADMISSION: Patient was independent and active without use of DME.    HOME SUPPORT PRIOR TO ADMISSION: The patient lived with extended family to include grandchildren that are home from college for summer and can assist.    Physical Therapy Goals  Initiated 7/1/2025  1.  Patient will move from supine to sit and sit to supine in bed with modified independence within 7 day(s).    2.  Patient will perform sit to stand with modified independence within 7 day(s).  3.  Patient will transfer from bed to chair and chair to bed with modified independence using the least restrictive device within 7 day(s).  4.  Patient will ambulate with modified independence for 150 feet with the least restrictive device within 7 day(s).   5.  Patient will ascend/descend 4 stairs with 1 handrail(s) with contact guard assist within 7 day(s).  6.  Patient will perform ALEXA home exercise program per protocol with supervision/set-up within 7 days.  7. Patient will verbalize and demonstrate understanding of posterior hip precautions per protocol within 4 days.    7/1/2025 1101 by Maisha Garcia, PT  Outcome: Progressing   PHYSICAL THERAPY TREATMENT    Patient: Maya Cantu (85 y.o. female)  Date: 7/1/2025  Diagnosis: Closed fracture of neck of right femur, initial encounter ([facility]) [S72.001A]  Traumatic hematoma of head, initial encounter [S00.93XA] Closed fracture of neck of right femur, initial encounter ([facility])  Procedure(s) (LRB):  RIGHT HIP HEMIARTHROPLASTY ANTERIOR APPROACH (Right) 1 Day Post-Op  Precautions: Restrictions/Precautions  Restrictions/Precautions: Fall Risk     Position Activity Restriction  Hip Precautions: Posterior hip precautions      ASSESSMENT:  Patient continues to benefit from skilled PT services and is

## 2025-07-01 NOTE — PLAN OF CARE
Problem: Discharge Planning  Goal: Discharge to home or other facility with appropriate resources  7/1/2025 0057 by Iliana Doe RN  Outcome: Progressing  6/30/2025 1623 by Brenda Soares RN  Outcome: Progressing     Problem: Pain  Goal: Verbalizes/displays adequate comfort level or baseline comfort level  7/1/2025 0057 by Iliana Doe RN  Outcome: Progressing  Flowsheets  Taken 6/30/2025 1915 by Jesus Valenzuela RN  Verbalizes/displays adequate comfort level or baseline comfort level: Encourage patient to monitor pain and request assistance  Taken 6/30/2025 1745 by Jesus Valenzuela RN  Verbalizes/displays adequate comfort level or baseline comfort level: Encourage patient to monitor pain and request assistance  6/30/2025 1623 by Brenda Soares RN  Outcome: Progressing     Problem: Skin/Tissue Integrity  Goal: Skin integrity remains intact  Description: 1.  Monitor for areas of redness and/or skin breakdown  2.  Assess vascular access sites hourly  3.  Every 4-6 hours minimum:  Change oxygen saturation probe site  4.  Every 4-6 hours:  If on nasal continuous positive airway pressure, respiratory therapy assess nares and determine need for appliance change or resting period  7/1/2025 0057 by Iliana Doe RN  Outcome: Progressing  Flowsheets  Taken 6/30/2025 1909 by Jesus Valenzuela RN  Skin Integrity Remains Intact: Monitor for areas of redness and/or skin breakdown  Taken 6/30/2025 1750 by Jesus Valenzuela RN  Skin Integrity Remains Intact: Monitor for areas of redness and/or skin breakdown  6/30/2025 1623 by Brenda Soares RN  Outcome: Progressing     Problem: Safety - Adult  Goal: Free from fall injury  7/1/2025 0057 by Iliana Doe RN  Outcome: Progressing  6/30/2025 1623 by Brenda Soares RN  Outcome: Progressing     Problem: ABCDS Injury Assessment  Goal: Absence of physical injury  7/1/2025 0057 by Iliana Doe RN  Outcome: Progressing  6/30/2025 1623 by

## 2025-07-02 LAB
ANION GAP SERPL CALC-SCNC: 7 MMOL/L (ref 2–12)
BASOPHILS # BLD: 0.03 K/UL (ref 0–0.1)
BASOPHILS NFR BLD: 0.6 % (ref 0–1)
BUN SERPL-MCNC: 14 MG/DL (ref 6–20)
BUN/CREAT SERPL: 14 (ref 12–20)
CALCIUM SERPL-MCNC: 8.7 MG/DL (ref 8.5–10.1)
CHLORIDE SERPL-SCNC: 102 MMOL/L (ref 97–108)
CO2 SERPL-SCNC: 24 MMOL/L (ref 21–32)
CREAT SERPL-MCNC: 0.99 MG/DL (ref 0.55–1.02)
DIFFERENTIAL METHOD BLD: ABNORMAL
EOSINOPHIL # BLD: 0.18 K/UL (ref 0–0.4)
EOSINOPHIL NFR BLD: 3.4 % (ref 0–7)
ERYTHROCYTE [DISTWIDTH] IN BLOOD BY AUTOMATED COUNT: 14.8 % (ref 11.5–14.5)
GLUCOSE SERPL-MCNC: 112 MG/DL (ref 65–100)
HCT VFR BLD AUTO: 24.8 % (ref 35–47)
HCT VFR BLD AUTO: 27 % (ref 35–47)
HGB BLD-MCNC: 8.2 G/DL (ref 11.5–16)
HGB BLD-MCNC: 9 G/DL (ref 11.5–16)
IMM GRANULOCYTES # BLD AUTO: 0.02 K/UL (ref 0–0.04)
IMM GRANULOCYTES NFR BLD AUTO: 0.4 % (ref 0–0.5)
LYMPHOCYTES # BLD: 1 K/UL (ref 0.8–3.5)
LYMPHOCYTES NFR BLD: 18.9 % (ref 12–49)
MCH RBC QN AUTO: 31.7 PG (ref 26–34)
MCHC RBC AUTO-ENTMCNC: 33.1 G/DL (ref 30–36.5)
MCV RBC AUTO: 95.8 FL (ref 80–99)
MONOCYTES # BLD: 0.19 K/UL (ref 0–1)
MONOCYTES NFR BLD: 3.6 % (ref 5–13)
NEUTS SEG # BLD: 3.88 K/UL (ref 1.8–8)
NEUTS SEG NFR BLD: 73.1 % (ref 32–75)
NRBC # BLD: 0 K/UL (ref 0–0.01)
NRBC BLD-RTO: 0 PER 100 WBC
PLATELET # BLD AUTO: 104 K/UL (ref 150–400)
PMV BLD AUTO: 11.1 FL (ref 8.9–12.9)
POTASSIUM SERPL-SCNC: 3.6 MMOL/L (ref 3.5–5.1)
RBC # BLD AUTO: 2.59 M/UL (ref 3.8–5.2)
SODIUM SERPL-SCNC: 133 MMOL/L (ref 136–145)
WBC # BLD AUTO: 5.3 K/UL (ref 3.6–11)

## 2025-07-02 PROCEDURE — 2500000003 HC RX 250 WO HCPCS: Performed by: ORTHOPAEDIC SURGERY

## 2025-07-02 PROCEDURE — 97116 GAIT TRAINING THERAPY: CPT

## 2025-07-02 PROCEDURE — 1100000000 HC RM PRIVATE

## 2025-07-02 PROCEDURE — 85025 COMPLETE CBC W/AUTO DIFF WBC: CPT

## 2025-07-02 PROCEDURE — 6370000000 HC RX 637 (ALT 250 FOR IP): Performed by: STUDENT IN AN ORGANIZED HEALTH CARE EDUCATION/TRAINING PROGRAM

## 2025-07-02 PROCEDURE — 6370000000 HC RX 637 (ALT 250 FOR IP): Performed by: ORTHOPAEDIC SURGERY

## 2025-07-02 PROCEDURE — 6360000002 HC RX W HCPCS: Performed by: ORTHOPAEDIC SURGERY

## 2025-07-02 PROCEDURE — 2700000000 HC OXYGEN THERAPY PER DAY

## 2025-07-02 PROCEDURE — 97535 SELF CARE MNGMENT TRAINING: CPT

## 2025-07-02 PROCEDURE — 94761 N-INVAS EAR/PLS OXIMETRY MLT: CPT

## 2025-07-02 PROCEDURE — 85018 HEMOGLOBIN: CPT

## 2025-07-02 PROCEDURE — 80048 BASIC METABOLIC PNL TOTAL CA: CPT

## 2025-07-02 PROCEDURE — 36415 COLL VENOUS BLD VENIPUNCTURE: CPT

## 2025-07-02 PROCEDURE — 97530 THERAPEUTIC ACTIVITIES: CPT

## 2025-07-02 PROCEDURE — 85014 HEMATOCRIT: CPT

## 2025-07-02 RX ORDER — HYDROMORPHONE HYDROCHLORIDE 2 MG/1
1 TABLET ORAL EVERY 4 HOURS PRN
Refills: 0 | Status: DISCONTINUED | OUTPATIENT
Start: 2025-07-02 | End: 2025-07-03 | Stop reason: HOSPADM

## 2025-07-02 RX ADMIN — ACETAMINOPHEN 650 MG: 325 TABLET ORAL at 02:15

## 2025-07-02 RX ADMIN — HYDROMORPHONE HYDROCHLORIDE 1 MG: 2 TABLET ORAL at 22:13

## 2025-07-02 RX ADMIN — DULOXETINE 60 MG: 30 CAPSULE, DELAYED RELEASE ORAL at 09:43

## 2025-07-02 RX ADMIN — ACETAMINOPHEN 650 MG: 325 TABLET ORAL at 20:14

## 2025-07-02 RX ADMIN — POLYETHYLENE GLYCOL 3350 17 G: 17 POWDER, FOR SOLUTION ORAL at 12:46

## 2025-07-02 RX ADMIN — METOPROLOL SUCCINATE 100 MG: 50 TABLET, EXTENDED RELEASE ORAL at 09:44

## 2025-07-02 RX ADMIN — AMLODIPINE BESYLATE 10 MG: 5 TABLET ORAL at 09:44

## 2025-07-02 RX ADMIN — SODIUM CHLORIDE, PRESERVATIVE FREE 10 ML: 5 INJECTION INTRAVENOUS at 20:14

## 2025-07-02 RX ADMIN — PANTOPRAZOLE SODIUM 40 MG: 40 TABLET, DELAYED RELEASE ORAL at 05:58

## 2025-07-02 RX ADMIN — SODIUM CHLORIDE, PRESERVATIVE FREE 10 ML: 5 INJECTION INTRAVENOUS at 09:44

## 2025-07-02 RX ADMIN — ENOXAPARIN SODIUM 30 MG: 100 INJECTION SUBCUTANEOUS at 09:45

## 2025-07-02 RX ADMIN — ACETAMINOPHEN 650 MG: 325 TABLET ORAL at 13:43

## 2025-07-02 RX ADMIN — SODIUM CHLORIDE, PRESERVATIVE FREE 10 ML: 5 INJECTION INTRAVENOUS at 09:46

## 2025-07-02 RX ADMIN — HYDROMORPHONE HYDROCHLORIDE 2 MG: 2 TABLET ORAL at 02:29

## 2025-07-02 RX ADMIN — SODIUM CHLORIDE, PRESERVATIVE FREE 10 ML: 5 INJECTION INTRAVENOUS at 12:46

## 2025-07-02 RX ADMIN — ACETAMINOPHEN 650 MG: 325 TABLET ORAL at 09:43

## 2025-07-02 ASSESSMENT — PAIN DESCRIPTION - ORIENTATION
ORIENTATION: RIGHT

## 2025-07-02 ASSESSMENT — PAIN DESCRIPTION - LOCATION
LOCATION: HIP

## 2025-07-02 ASSESSMENT — PAIN - FUNCTIONAL ASSESSMENT: PAIN_FUNCTIONAL_ASSESSMENT: ACTIVITIES ARE NOT PREVENTED

## 2025-07-02 ASSESSMENT — PAIN SCALES - GENERAL
PAINLEVEL_OUTOF10: 8
PAINLEVEL_OUTOF10: 1
PAINLEVEL_OUTOF10: 7
PAINLEVEL_OUTOF10: 1
PAINLEVEL_OUTOF10: 7
PAINLEVEL_OUTOF10: 0
PAINLEVEL_OUTOF10: 2
PAINLEVEL_OUTOF10: 5

## 2025-07-02 ASSESSMENT — PAIN DESCRIPTION - DESCRIPTORS
DESCRIPTORS: ACHING
DESCRIPTORS: DISCOMFORT;ACHING
DESCRIPTORS: ACHING

## 2025-07-02 NOTE — PLAN OF CARE
Problem: Discharge Planning  Goal: Discharge to home or other facility with appropriate resources  Outcome: Progressing     Problem: Pain  Goal: Verbalizes/displays adequate comfort level or baseline comfort level  Outcome: Progressing     Problem: Skin/Tissue Integrity  Goal: Skin integrity remains intact  Description: 1.  Monitor for areas of redness and/or skin breakdown  2.  Assess vascular access sites hourly  3.  Every 4-6 hours minimum:  Change oxygen saturation probe site  4.  Every 4-6 hours:  If on nasal continuous positive airway pressure, respiratory therapy assess nares and determine need for appliance change or resting period  Outcome: Progressing     Problem: Safety - Adult  Goal: Free from fall injury  Outcome: Progressing     Problem: ABCDS Injury Assessment  Goal: Absence of physical injury  Outcome: Progressing

## 2025-07-02 NOTE — DISCHARGE INSTRUCTIONS
HOSPITALIST DISCHARGE INSTRUCTIONS  NAME:  Maya Cantu   :  1940   MRN:  [id number]     Date/Time:  7/3/2025 7:31 AM    ADMIT DATE: 2025     DISCHARGE DATE: 7/3/2025     DISCHARGE DIAGNOSIS:  Fall with right hip fracture, anemia, elevated blood pressure, chronic low sodium, insomnia    DISCHARGE INSTRUCTIONS:  Thank you for allowing us to participate in your care. Your discharging Hospitalist is Darin bean MD. You were admitted for evaluation and treatment of the above.     Fall with right hip fracture: You were seen by the orthopedic team and had repair of the Acute displaced right femoral neck fracture on 25. Continue to work with home health therapy. Follow up with Dr. Avery in 2 weeks. To prevent blood clots, you need to take aspirin 81 mg two times per day for 30 days. Takes this with protonix to avoid any stomach upset. You can take tylenol as needed for pain. I will provide a short course of dilaudid for severe break through pain. Only take 1 mg (THIS IS A HALF TABLET). BE VERY CAREFUL WITH THIS MEDICATION, IT CAN INCREASE FALL RISK. This is a controlled substance. You cannot drive while on this medication. Use with caution & sparingly as possible. I will provide you with narcan, the opioid reversal agent for home. Please ensure that a family member knows where it is and how + when to use it in case of emergency.     Anemia: you blood counts dropped after surgery. They have stabilized.  Please follow up with PCP in 1 week. You should have your labs rechecked in 7 - 10 days    Elevated blood pressure: Your blood pressure was elevated while you were in the hospital. I do suspect pain was contributing to this. Please continue to take a full tablet of metoprolol 100 mg daily. Also take amlodipine which is new medicaiton. Monitor your blood pressure at home and follow up with your primary care doctor.     Chronic low sodium: Avoid drinking more than 2 L of fluid per day. You can liberalize

## 2025-07-02 NOTE — PLAN OF CARE
Problem: Discharge Planning  Goal: Discharge to home or other facility with appropriate resources  7/2/2025 1137 by Rose Heard LPN  Outcome: Progressing  7/1/2025 2205 by Irene Youngblood RN  Outcome: Progressing  7/1/2025 2203 by Irene Youngblood RN  Outcome: Progressing     Problem: Pain  Goal: Verbalizes/displays adequate comfort level or baseline comfort level  7/2/2025 1137 by Rose Heard LPN  Outcome: Progressing  7/1/2025 2205 by Irene Youngblood RN  Outcome: Progressing  7/1/2025 2203 by Irene Youngblood RN  Outcome: Progressing     Problem: Skin/Tissue Integrity  Goal: Skin integrity remains intact  Description: 1.  Monitor for areas of redness and/or skin breakdown  2.  Assess vascular access sites hourly  3.  Every 4-6 hours minimum:  Change oxygen saturation probe site  4.  Every 4-6 hours:  If on nasal continuous positive airway pressure, respiratory therapy assess nares and determine need for appliance change or resting period  7/2/2025 1137 by Rose Heard LPN  Outcome: Progressing  7/1/2025 2205 by Irene Youngblood RN  Outcome: Progressing  7/1/2025 2203 by Irene Youngblood RN  Outcome: Progressing     Problem: Safety - Adult  Goal: Free from fall injury  7/2/2025 1137 by Rose Heard LPN  Outcome: Progressing  7/1/2025 2205 by Irene Youngblood RN  Outcome: Progressing  7/1/2025 2203 by Irene Youngblood RN  Outcome: Progressing     Problem: ABCDS Injury Assessment  Goal: Absence of physical injury  7/2/2025 1137 by Rose Heard LPN  Outcome: Progressing  7/1/2025 2205 by Irene Youngblood RN  Outcome: Progressing  7/1/2025 2203 by Irene Youngblood RN  Outcome: Progressing

## 2025-07-02 NOTE — PLAN OF CARE
Problem: Occupational Therapy - Adult  Goal: By Discharge: Performs self-care activities at highest level of function for planned discharge setting.  See evaluation for individualized goals.  Description: FUNCTIONAL STATUS PRIOR TO ADMISSION:  Pt was independent ADLs, family assisting with IADLs PRN, pt was driving, and ambulating without AD  Receives Help From: Family, Prior Level of Assist for ADLs: Independent,  ,  ,  ,  ,  , Prior Level of Assist for Homemaking: Independent (Family assists w/ cooking, cleaning,laundry PRN), Ambulation Assistance: Independent, Prior Level of Assist for Transfers: Independent, Active : Yes (Family can provide transportation)     HOME SUPPORT: Patient lived with family but didn't require assistance.    Occupational Therapy Goals:  Initiated 7/1/2025  1.  Patient will perform grooming standing at sink with [place] within 7 day(s).  2.  Patient will perform lower body dressing with use of AE Supervision within 7 day(s).  3.  Patient will perform bathing with Supervision within 7 day(s).  4.  Patient will perform toilet transfers with Supervision  within 7 day(s).  5.  Patient will perform all aspects of toileting with Supervision within 7 day(s).  6.  Patient will participate in upper extremity therapeutic exercise/activities with [place] for 5 minutes within 7 day(s).    7.  Patient will utilize energy conservation techniques during functional activities with verbal cues within 7 day(s).    Outcome: Progressing   OCCUPATIONAL THERAPY TREATMENT  Patient: Maya Cantu (85 y.o. female)  Date: 7/2/2025  Primary Diagnosis: Closed fracture of neck of right femur, initial encounter ([facility]) [S72.001A]  Traumatic hematoma of head, initial encounter [S00.93XA]  Procedure(s) (LRB):  RIGHT HIP HEMIARTHROPLASTY ANTERIOR APPROACH (Right) 2 Days Post-Op   Precautions: Fall Risk             Hip Precautions: Posterior hip precautions  Chart, occupational therapy assessment, plan

## 2025-07-02 NOTE — PLAN OF CARE
Problem: Discharge Planning  Goal: Discharge to home or other facility with appropriate resources  7/1/2025 2205 by Irene Youngblood RN  Outcome: Progressing  7/1/2025 2203 by Irene Youngblood RN  Outcome: Progressing     Problem: Pain  Goal: Verbalizes/displays adequate comfort level or baseline comfort level  7/1/2025 2205 by Irene Youngblood RN  Outcome: Progressing  7/1/2025 2203 by Irene Youngblood RN  Outcome: Progressing     Problem: Skin/Tissue Integrity  Goal: Skin integrity remains intact  Description: 1.  Monitor for areas of redness and/or skin breakdown  2.  Assess vascular access sites hourly  3.  Every 4-6 hours minimum:  Change oxygen saturation probe site  4.  Every 4-6 hours:  If on nasal continuous positive airway pressure, respiratory therapy assess nares and determine need for appliance change or resting period  7/1/2025 2205 by Irene Youngblood RN  Outcome: Progressing  7/1/2025 2203 by Irene Youngblood RN  Outcome: Progressing     Problem: Safety - Adult  Goal: Free from fall injury  7/1/2025 2205 by Ierne Youngblood RN  Outcome: Progressing  7/1/2025 2203 by Irene Youngblood RN  Outcome: Progressing     Problem: ABCDS Injury Assessment  Goal: Absence of physical injury  7/1/2025 2205 by Irene Youngblood RN  Outcome: Progressing  7/1/2025 2203 by Irene Youngblood RN  Outcome: Progressing

## 2025-07-02 NOTE — CARE COORDINATION
7/2/2025  3:13 PM  Care Management Progress Note    Reason for Admission:   Closed fracture of neck of right femur, initial encounter ([facility]) [S72.001A]  Traumatic hematoma of head, initial encounter [S00.93XA]  Procedure(s) (LRB):  RIGHT HIP HEMIARTHROPLASTY ANTERIOR APPROACH (Right)  2 Days Post-Op    Patient Admission Status: Inpatient  Date Admitted to INP: 7/1/25 []NA - OBS/Outpatient  RUR: Readmission Risk Score: 14.3    Hospitalization in the last 30 days (Readmission):  No        Transition of care plan:  Awaiting medical clearance and DC order. Therapy following. Ortho following.   Home with HH - [facility] accepted. Per pt's son and DIL, they have purchased a twin bed for downstairs, BSC, and bed rail as pt's bedroom is on the second floor. Equipment will be delivered to pt's home tomorrow. RW delivered to pt through [employer] previously.   Date IM given: 6/30/25 []NA  Outpatient follow-up.  Discharge transport: Family       06/30/25 0914   Service Assessment   Patient Orientation Alert and Oriented   Cognition Alert   History Provided By Child/Family;Patient   Primary Caregiver Self   Support Systems Children;Family Members   Patient's Healthcare Decision Maker is: Legal Next of Kin  (Lawson Darling (C) [phone])   PCP Verified by CM Yes  (Reuben Millard MD)   Last Visit to PCP Within last 3 months   Prior Functional Level Independent in ADLs/IADLs;Assistance with the following:;Cooking;Housework;Shopping  (Family assists w/ iADLS PRN)   Current Functional Level Other (see comment)  (PT, OT cleo pending)   Can patient return to prior living arrangement Unknown at present   Ability to make needs known: Good   Family able to assist with home care needs: Yes   Financial Resources Medicare;Other (Comment)  (Medicare, )   Community Resources None   Social/Functional History   Lives With Family;Son   Type of Home House   Home Layout Two level;Bed/Bath upstairs   Home Access Stairs to enter with rails

## 2025-07-02 NOTE — PLAN OF CARE
Problem: Physical Therapy - Adult  Goal: By Discharge: Performs mobility at highest level of function for planned discharge setting.  See evaluation for individualized goals.  Description: FUNCTIONAL STATUS PRIOR TO ADMISSION: Patient was independent and active without use of DME.    HOME SUPPORT PRIOR TO ADMISSION: The patient lived with extended family to include grandchildren that are home from college for summer and can assist.    Physical Therapy Goals  Initiated 7/1/2025  1.  Patient will move from supine to sit and sit to supine in bed with modified independence within 7 day(s).    2.  Patient will perform sit to stand with modified independence within 7 day(s).  3.  Patient will transfer from bed to chair and chair to bed with modified independence using the least restrictive device within 7 day(s).  4.  Patient will ambulate with modified independence for 150 feet with the least restrictive device within 7 day(s).   5.  Patient will ascend/descend 4 stairs with 1 handrail(s) with contact guard assist within 7 day(s).  6.  Patient will perform ALEXA home exercise program per protocol with supervision/set-up within 7 days.  7. Patient will verbalize and demonstrate understanding of posterior hip precautions per protocol within 4 days.    Outcome: Progressing   PHYSICAL THERAPY TREATMENT    Patient: Maya Cantu (85 y.o. female)  Date: 7/2/2025  Diagnosis: Closed fracture of neck of right femur, initial encounter ([facility]) [S72.001A]  Traumatic hematoma of head, initial encounter [S00.93XA] Closed fracture of neck of right femur, initial encounter ([facility])  Procedure(s) (LRB):  RIGHT HIP HEMIARTHROPLASTY ANTERIOR APPROACH (Right) 2 Days Post-Op  Precautions: Restrictions/Precautions  Restrictions/Precautions: Fall Risk     Position Activity Restriction  Hip Precautions: Posterior hip precautions      ASSESSMENT:  Patient continues to benefit from skilled PT services.Pt sit to stand with CGA.Pt ambulated 70ft with

## 2025-07-03 VITALS
TEMPERATURE: 97.9 F | SYSTOLIC BLOOD PRESSURE: 138 MMHG | DIASTOLIC BLOOD PRESSURE: 69 MMHG | RESPIRATION RATE: 16 BRPM | WEIGHT: 140 LBS | BODY MASS INDEX: 24.8 KG/M2 | HEIGHT: 63 IN | OXYGEN SATURATION: 94 % | HEART RATE: 71 BPM

## 2025-07-03 LAB
ABO + RH BLD: NORMAL
ANION GAP SERPL CALC-SCNC: 5 MMOL/L (ref 2–12)
BASOPHILS # BLD: 0.03 K/UL (ref 0–0.1)
BASOPHILS NFR BLD: 0.6 % (ref 0–1)
BLD PROD TYP BPU: NORMAL
BLOOD BANK CMNT PATIENT-IMP: NORMAL
BLOOD BANK DISPENSE STATUS: NORMAL
BLOOD GROUP ANTIBODIES SERPL: NORMAL
BPU ID: NORMAL
BUN SERPL-MCNC: 18 MG/DL (ref 6–20)
BUN/CREAT SERPL: 19 (ref 12–20)
CALCIUM SERPL-MCNC: 8.7 MG/DL (ref 8.5–10.1)
CHLORIDE SERPL-SCNC: 101 MMOL/L (ref 97–108)
CO2 SERPL-SCNC: 27 MMOL/L (ref 21–32)
CREAT SERPL-MCNC: 0.94 MG/DL (ref 0.55–1.02)
CROSSMATCH RESULT: NORMAL
DAT POLY-SP REAG RBC QL: NORMAL
DIFFERENTIAL METHOD BLD: ABNORMAL
EOSINOPHIL # BLD: 0.24 K/UL (ref 0–0.4)
EOSINOPHIL NFR BLD: 4.7 % (ref 0–7)
ERYTHROCYTE [DISTWIDTH] IN BLOOD BY AUTOMATED COUNT: 14.6 % (ref 11.5–14.5)
GLUCOSE SERPL-MCNC: 118 MG/DL (ref 65–100)
HCT VFR BLD AUTO: 25.6 % (ref 35–47)
HGB BLD-MCNC: 8.5 G/DL (ref 11.5–16)
IMM GRANULOCYTES # BLD AUTO: 0.02 K/UL (ref 0–0.04)
IMM GRANULOCYTES NFR BLD AUTO: 0.4 % (ref 0–0.5)
LYMPHOCYTES # BLD: 1.26 K/UL (ref 0.8–3.5)
LYMPHOCYTES NFR BLD: 24.5 % (ref 12–49)
MCH RBC QN AUTO: 31.8 PG (ref 26–34)
MCHC RBC AUTO-ENTMCNC: 33.2 G/DL (ref 30–36.5)
MCV RBC AUTO: 95.9 FL (ref 80–99)
MONOCYTES # BLD: 0.29 K/UL (ref 0–1)
MONOCYTES NFR BLD: 5.6 % (ref 5–13)
NEUTS SEG # BLD: 3.31 K/UL (ref 1.8–8)
NEUTS SEG NFR BLD: 64.2 % (ref 32–75)
NRBC # BLD: 0 K/UL (ref 0–0.01)
NRBC BLD-RTO: 0 PER 100 WBC
PLATELET # BLD AUTO: 114 K/UL (ref 150–400)
PMV BLD AUTO: 11.6 FL (ref 8.9–12.9)
POTASSIUM SERPL-SCNC: 3.4 MMOL/L (ref 3.5–5.1)
RBC # BLD AUTO: 2.67 M/UL (ref 3.8–5.2)
SODIUM SERPL-SCNC: 133 MMOL/L (ref 136–145)
SPECIMEN EXP DATE BLD: NORMAL
UNIT DIVISION: 0
WBC # BLD AUTO: 5.2 K/UL (ref 3.6–11)

## 2025-07-03 PROCEDURE — 6360000002 HC RX W HCPCS: Performed by: ORTHOPAEDIC SURGERY

## 2025-07-03 PROCEDURE — 2500000003 HC RX 250 WO HCPCS: Performed by: ORTHOPAEDIC SURGERY

## 2025-07-03 PROCEDURE — 97116 GAIT TRAINING THERAPY: CPT

## 2025-07-03 PROCEDURE — 97530 THERAPEUTIC ACTIVITIES: CPT

## 2025-07-03 PROCEDURE — 94761 N-INVAS EAR/PLS OXIMETRY MLT: CPT

## 2025-07-03 PROCEDURE — 6370000000 HC RX 637 (ALT 250 FOR IP)

## 2025-07-03 PROCEDURE — 6370000000 HC RX 637 (ALT 250 FOR IP): Performed by: ORTHOPAEDIC SURGERY

## 2025-07-03 PROCEDURE — 6370000000 HC RX 637 (ALT 250 FOR IP): Performed by: STUDENT IN AN ORGANIZED HEALTH CARE EDUCATION/TRAINING PROGRAM

## 2025-07-03 PROCEDURE — 80048 BASIC METABOLIC PNL TOTAL CA: CPT

## 2025-07-03 PROCEDURE — 36415 COLL VENOUS BLD VENIPUNCTURE: CPT

## 2025-07-03 PROCEDURE — 85025 COMPLETE CBC W/AUTO DIFF WBC: CPT

## 2025-07-03 RX ORDER — PANTOPRAZOLE SODIUM 40 MG/1
40 TABLET, DELAYED RELEASE ORAL DAILY
Qty: 30 TABLET | Refills: 0 | Status: SHIPPED | OUTPATIENT
Start: 2025-07-03

## 2025-07-03 RX ORDER — METOPROLOL SUCCINATE 100 MG/1
100 TABLET, EXTENDED RELEASE ORAL DAILY
Qty: 30 TABLET | Refills: 1 | Status: SHIPPED | OUTPATIENT
Start: 2025-07-04

## 2025-07-03 RX ORDER — HYDROCODONE BITARTRATE AND ACETAMINOPHEN 5; 325 MG/1; MG/1
1 TABLET ORAL ONCE
Status: COMPLETED | OUTPATIENT
Start: 2025-07-03 | End: 2025-07-03

## 2025-07-03 RX ORDER — HYDROMORPHONE HYDROCHLORIDE 2 MG/1
1 TABLET ORAL EVERY 8 HOURS PRN
Qty: 6 TABLET | Refills: 0 | Status: SHIPPED | OUTPATIENT
Start: 2025-07-03 | End: 2025-07-08

## 2025-07-03 RX ORDER — AMLODIPINE BESYLATE 10 MG/1
10 TABLET ORAL DAILY
Qty: 30 TABLET | Refills: 1 | Status: SHIPPED | OUTPATIENT
Start: 2025-07-04

## 2025-07-03 RX ORDER — ASPIRIN 81 MG/1
81 TABLET ORAL 2 TIMES DAILY
Qty: 60 TABLET | Refills: 0 | Status: SHIPPED | OUTPATIENT
Start: 2025-07-03 | End: 2025-08-02

## 2025-07-03 RX ORDER — POTASSIUM CHLORIDE 750 MG/1
40 TABLET, EXTENDED RELEASE ORAL ONCE
Status: COMPLETED | OUTPATIENT
Start: 2025-07-03 | End: 2025-07-03

## 2025-07-03 RX ADMIN — METOPROLOL SUCCINATE 100 MG: 50 TABLET, EXTENDED RELEASE ORAL at 09:20

## 2025-07-03 RX ADMIN — HYDROCODONE BITARTRATE AND ACETAMINOPHEN 1 TABLET: 5; 325 TABLET ORAL at 01:15

## 2025-07-03 RX ADMIN — DULOXETINE 60 MG: 30 CAPSULE, DELAYED RELEASE ORAL at 09:19

## 2025-07-03 RX ADMIN — SODIUM CHLORIDE, PRESERVATIVE FREE 10 ML: 5 INJECTION INTRAVENOUS at 09:22

## 2025-07-03 RX ADMIN — AMLODIPINE BESYLATE 10 MG: 5 TABLET ORAL at 09:20

## 2025-07-03 RX ADMIN — POTASSIUM CHLORIDE 40 MEQ: 750 TABLET, FILM COATED, EXTENDED RELEASE ORAL at 06:59

## 2025-07-03 RX ADMIN — HYDROMORPHONE HYDROCHLORIDE 1 MG: 2 TABLET ORAL at 02:33

## 2025-07-03 RX ADMIN — ENOXAPARIN SODIUM 30 MG: 100 INJECTION SUBCUTANEOUS at 09:22

## 2025-07-03 RX ADMIN — ACETAMINOPHEN 650 MG: 325 TABLET ORAL at 02:33

## 2025-07-03 RX ADMIN — POTASSIUM CHLORIDE 40 MEQ: 750 TABLET, FILM COATED, EXTENDED RELEASE ORAL at 09:19

## 2025-07-03 RX ADMIN — HYDROMORPHONE HYDROCHLORIDE 1 MG: 2 TABLET ORAL at 06:36

## 2025-07-03 RX ADMIN — PANTOPRAZOLE SODIUM 40 MG: 40 TABLET, DELAYED RELEASE ORAL at 06:36

## 2025-07-03 RX ADMIN — SODIUM CHLORIDE, PRESERVATIVE FREE 10 ML: 5 INJECTION INTRAVENOUS at 09:23

## 2025-07-03 RX ADMIN — Medication 6 MG: at 03:01

## 2025-07-03 ASSESSMENT — PAIN - FUNCTIONAL ASSESSMENT
PAIN_FUNCTIONAL_ASSESSMENT: ACTIVITIES ARE NOT PREVENTED

## 2025-07-03 ASSESSMENT — PAIN SCALES - GENERAL
PAINLEVEL_OUTOF10: 3
PAINLEVEL_OUTOF10: 10
PAINLEVEL_OUTOF10: 4
PAINLEVEL_OUTOF10: 7
PAINLEVEL_OUTOF10: 1
PAINLEVEL_OUTOF10: 9
PAINLEVEL_OUTOF10: 4
PAINLEVEL_OUTOF10: 0

## 2025-07-03 ASSESSMENT — PAIN DESCRIPTION - LOCATION
LOCATION: HIP

## 2025-07-03 ASSESSMENT — PAIN SCALES - WONG BAKER
WONGBAKER_NUMERICALRESPONSE: NO HURT
WONGBAKER_NUMERICALRESPONSE: NO HURT

## 2025-07-03 ASSESSMENT — PAIN DESCRIPTION - DESCRIPTORS
DESCRIPTORS: DISCOMFORT
DESCRIPTORS: ACHING;DISCOMFORT
DESCRIPTORS: ACHING;SORE

## 2025-07-03 ASSESSMENT — PAIN DESCRIPTION - ORIENTATION
ORIENTATION: RIGHT

## 2025-07-03 NOTE — PROGRESS NOTES
Orthopaedic Progress Note  Post Op day: 3 Days Post-Op    July 3, 2025 10:54 AM     Patient: Maya Cantu MRN: [id number]  SSN: [id number]    YOB: 1940  Age: 85 y.o.  Sex: female      Admit date:  2025  Procedures:  Procedure(s):  RIGHT HIP HEMIARTHROPLASTY ANTERIOR APPROACH  Admitting Physician:  Darin Antunez MD   Surgeon:  Surgeons and Role:     * Robert Avery MD - Primary    Consulting Physician(s): Treatment Team:   Darin Antunez MD Timberlake, Trinia, Robert Noyola, MD Villavicencio, Bridgett Ortega, Shelly JACQUES, APRN - NP  Rose Heard, MIKALN  Marilyn Woodard, Pricila Cabrera, PT    SUBJECTIVE:     Maya Cantu is a 85 y.o. female is 3 Days Post-Op s/p Procedure(s):  RIGHT HIP HEMIARTHROPLASTY ANTERIOR APPROACH with an appropriate level of post-operative pain.  Son at bedside.  No complaints of nausea, vomiting, dizziness, lightheadedness, chest pain, or shortness of breath.    OBJECTIVE:       Physical Exam:  General: Alert, cooperative, no distress.    Respiratory: Respirations unlabored  Neurological:  Neurovascular exam within normal limits.  Motor: + DF/PF.   Musculoskeletal: Calves soft, supple, non-tender upon palpation.  Right thigh is soft and compressible.  No erythema.  Moves ankles ok.    Dressing/Wound:  Clean, dry and intact. No significant erythema or swelling.      Vital Signs:      Patient Vitals for the past 8 hrs:   BP Temp Temp src Pulse Resp SpO2   25 1001 138/69 -- -- -- -- --   25 0956 138/69 -- -- 71 -- --   25 0952 (!) 150/99 -- -- 74 -- --   25 0855 (!) 177/65 97.9 °F (36.6 °C) Oral 71 16 94 %   25 0636 (!) 174/72 -- -- 70 18 95 %   25 0415 (!) 180/71 -- -- -- -- --   25 0303 -- -- -- -- 18 --   25 0257 (!) 186/76 -- -- -- -- --   25 0255 (!) 188/76 -- -- -- -- --                                          Temp (24hrs), Av.1 °F (36.7 °C), Min:97.3 °F (36.3 °C), Max:98.6 °F (37 °C)      Labs: 
Nurse handed patient a copy of discharge instructions which have been read and explained to patient. New medications were read and explained to patient, patient verbalized understanding. Patient aware that prescriptions have been electronically sent to their pharmacy. Opportunity for questions and clarification offered. Removed patient's IV access with no complications. Vital signs stable. Patient sent with all belongings.   
Orthopedic NP Progress Note  Post Op Day: 2 Days Post-Op    July 2, 2025 9:18 AM     Maya Cantu    Attending Physician: Treatment Team:   Darin Antunez MD Timberlake, Trinia, RN Holst, David, MD Epperson, Bridgett Ortega, Shelly JACQUES, APRN - NP  Rose Heard, MIKALN  Rich, Pricila ALVARADO, BREANNE Haney, Kings NEAL, PTA     Vital Signs:    Patient Vitals for the past 8 hrs:   BP Temp Temp src Pulse Resp SpO2   07/02/25 0830 (!) 169/70 98.2 °F (36.8 °C) Oral 71 16 97 %   07/02/25 0346 (!) 163/63 98.2 °F (36.8 °C) Oral 71 17 100 %   07/02/25 0259 -- -- -- -- 16 --          Intake/Output:  No intake/output data recorded.  06/30 1901 - 07/02 0700  In: 681 [P.O.:420; I.V.:261]  Out: 900 [Urine:900]    Pain Control:        LAB:    Recent Labs     07/02/25  0338   HCT 24.8*   HGB 8.2*     Lab Results   Component Value Date/Time     07/02/2025 03:38 AM    K 3.6 07/02/2025 03:38 AM     07/02/2025 03:38 AM    CO2 24 07/02/2025 03:38 AM    BUN 14 07/02/2025 03:38 AM       Subjective:  Maya Cantu is a 85 y.o. female s/p a  Procedure(s):  RIGHT HIP HEMIARTHROPLASTY ANTERIOR APPROACH   Procedure(s):  RIGHT HIP HEMIARTHROPLASTY ANTERIOR APPROACH. Tolerating diet. Pain is well managed and did well with PT yesterday        Objective: General: alert, cooperative, no distress.    Neuro/Vascular: CNS Intact.  Sensation stable. Brisk cap refill, 2+ pulses UE/LE  Musculoskeletal:    RLE - limited ROM, +DF/PF, NVI   Skin: warm and dry   Dressing - clean, dry and intact.              PT/OT:   Gait:                      Assessment:    s/p Procedure(s):  RIGHT HIP HEMIARTHROPLASTY ANTERIOR APPROACH    Principal Problem:    Closed fracture of neck of right femur, initial encounter ([facility])  Resolved Problems:    * No resolved hospital problems. *       Plan:   -  PT/OT - WBAT with walker   -  Pain Managment - continue current Pain management regimen as per orders   -  VTE Prophylaxes - TEDS &/or SCDs with Lovenox while inpt, at DC may 
Orthopedic Rounding Note    Subjective:  Patient reports having some nausea overnight. Pain is mild at present.    Objective:  Vitals:    07/01/25 0545   BP: (!) 186/80   Pulse: 76   Resp:    Temp:    SpO2:        Recent Labs     06/30/25  0720 07/01/25  0511   HGB  --  9.2*   HCT  --  27.3*   INR 1.1  --    NA  --  135*   K  --  3.5   CL  --  104   CO2  --  25   BUN  --  12         Exam:    NAD   Breathing well on room air  Adequate perfusion in distal extremities    RLE:  Bandage C/D/I  Swelling within expected limits  No evidence of DVT  Palpable pedal pulse distally  Sensation intact to light touch in S/S/DP/SP/T nerve distributions  Motor intact to EHL/FHL/TA/GSC       Assessment:  S/p R hip ivan for FNF    Plan:  Start DVT prophylaxis today. Lovenox, SCDs. If no other risk factors and adequately mobile, ASA 81mg BID for 30 days can be considered  WBAT  PTOT  Continue in hospital care for now  Pain control  Anticipate discharge in a couple of days  Ortho to arrange outpatient follow up        
Patient Fall Protocol  Yellow arm band applied to patient and yellow non skid socks placed on  Bed in low position, all side rails up, call bell in reach  Pt and Family instructed in \"call- don't fall\" protocol   -use your call bell, wait for assistance, staff not family will assist you to get up and move about   Pt and family verbalize understanding of fall precautions and the \"call don't fall\" Protocol    
Physical Therapy  6/30/2025    Orders received and acknowledged. Chart reviewed and noted \"acute displaced right femoral neck fracture.\" Awaiting plan from ortho. Will defer and continue to follow when medically appropriate and weight bearing orders are placed.     Thank You,  Luanne Fry, PT, DPT    
Spiritual Care Partner Volunteer visited patient at [facility] in [facility] on 7/1/2025     Documented by:  Olman Ivory MDiv  Staff   Paging Service [phone] (ZAKIYA)    
TRANSFER - OUT REPORT:    Verbal report given to Laverne(name) on Maya Cantu  being transferred to 4th floor; room 414(unit) for routine post-op       Report consisted of patient’s Situation, Background, Assessment and   Recommendations(SBAR).     Information from the following report(s) Nurse Handoff Report was reviewed with the receiving nurse.    Opportunity for questions and clarification was provided.      Patient transported with:   O2 @ 3 liters  Registered Nurse             
  Pulmonary:      Effort: Pulmonary effort is normal.      Breath sounds: Normal breath sounds.      Comments: Breathing comfortable on RA  Abdominal:      General: Abdomen is flat.   Musculoskeletal:      Right lower leg: No edema.      Left lower leg: No edema.   Skin:     General: Skin is warm and dry.      Comments: R hip bandage c/d/i   Neurological:      General: No focal deficit present.   Psychiatric:         Mood and Affect: Mood normal.                Medications Reviewed: (see below)    Lab Data Reviewed: (see below)    ______________________________________________________________________    Medications:     Current Facility-Administered Medications   Medication Dose Route Frequency    sodium chloride flush 0.9 % injection 5-40 mL  5-40 mL IntraVENous 2 times per day    sodium chloride flush 0.9 % injection 5-40 mL  5-40 mL IntraVENous PRN    0.9 % sodium chloride infusion   IntraVENous PRN    potassium chloride (KLOR-CON) extended release tablet 40 mEq  40 mEq Oral PRN    Or    potassium bicarb-citric acid (EFFER-K) effervescent tablet 40 mEq  40 mEq Oral PRN    Or    potassium chloride 10 mEq/100 mL IVPB (Peripheral Line)  10 mEq IntraVENous PRN    magnesium sulfate 2000 mg in 50 mL IVPB premix  2,000 mg IntraVENous PRN    ondansetron (ZOFRAN-ODT) disintegrating tablet 4 mg  4 mg Oral Q8H PRN    Or    ondansetron (ZOFRAN) injection 4 mg  4 mg IntraVENous Q6H PRN    polyethylene glycol (GLYCOLAX) packet 17 g  17 g Oral Daily PRN    acetaminophen (TYLENOL) tablet 650 mg  650 mg Oral Q6H PRN    Or    acetaminophen (TYLENOL) suppository 650 mg  650 mg Rectal Q6H PRN    sodium chloride flush 0.9 % injection 5-40 mL  5-40 mL IntraVENous 2 times per day    sodium chloride flush 0.9 % injection 5-40 mL  5-40 mL IntraVENous PRN    0.9 % sodium chloride infusion   IntraVENous PRN    HYDROmorphone (DILAUDID) injection 0.25 mg  0.25 mg IntraVENous Q3H PRN    Or    HYDROmorphone (DILAUDID) injection 0.5 mg  0.5 mg 
Normocephalic and atraumatic.      Right Ear: External ear normal.      Left Ear: External ear normal.      Nose: Nose normal.      Mouth/Throat:      Pharynx: Oropharynx is clear.   Eyes:      Extraocular Movements: Extraocular movements intact.      Conjunctiva/sclera: Conjunctivae normal.   Cardiovascular:      Rate and Rhythm: Normal rate.   Pulmonary:      Effort: Pulmonary effort is normal.      Breath sounds: Normal breath sounds.      Comments: Breathing comfortable on RA  Abdominal:      General: Abdomen is flat.   Musculoskeletal:      Right lower leg: No edema.      Left lower leg: No edema.   Skin:     General: Skin is warm and dry.      Comments: R hip bandage c/d/i   Neurological:      General: No focal deficit present.   Psychiatric:         Mood and Affect: Mood normal.                Medications Reviewed: (see below)    Lab Data Reviewed: (see below)    ______________________________________________________________________    Medications:     Current Facility-Administered Medications   Medication Dose Route Frequency    sodium chloride flush 0.9 % injection 5-40 mL  5-40 mL IntraVENous 2 times per day    sodium chloride flush 0.9 % injection 5-40 mL  5-40 mL IntraVENous PRN    0.9 % sodium chloride infusion   IntraVENous PRN    potassium chloride (KLOR-CON) extended release tablet 40 mEq  40 mEq Oral PRN    Or    potassium bicarb-citric acid (EFFER-K) effervescent tablet 40 mEq  40 mEq Oral PRN    Or    potassium chloride 10 mEq/100 mL IVPB (Peripheral Line)  10 mEq IntraVENous PRN    magnesium sulfate 2000 mg in 50 mL IVPB premix  2,000 mg IntraVENous PRN    ondansetron (ZOFRAN-ODT) disintegrating tablet 4 mg  4 mg Oral Q8H PRN    Or    ondansetron (ZOFRAN) injection 4 mg  4 mg IntraVENous Q6H PRN    polyethylene glycol (GLYCOLAX) packet 17 g  17 g Oral Daily PRN    acetaminophen (TYLENOL) tablet 650 mg  650 mg Oral Q6H PRN    Or    acetaminophen (TYLENOL) suppository 650 mg  650 mg Rectal Q6H PRN

## 2025-07-03 NOTE — PLAN OF CARE
Problem: Physical Therapy - Adult  Goal: By Discharge: Performs mobility at highest level of function for planned discharge setting.  See evaluation for individualized goals.  Description: FUNCTIONAL STATUS PRIOR TO ADMISSION: Patient was independent and active without use of DME.    HOME SUPPORT PRIOR TO ADMISSION: The patient lived with extended family to include grandchildren that are home from college for summer and can assist.    Physical Therapy Goals  Initiated 7/1/2025  1.  Patient will move from supine to sit and sit to supine in bed with modified independence within 7 day(s).    2.  Patient will perform sit to stand with modified independence within 7 day(s).  3.  Patient will transfer from bed to chair and chair to bed with modified independence using the least restrictive device within 7 day(s).  4.  Patient will ambulate with modified independence for 150 feet with the least restrictive device within 7 day(s).   5.  Patient will ascend/descend 4 stairs with 1 handrail(s) with contact guard assist within 7 day(s).  6.  Patient will perform ALEXA home exercise program per protocol with supervision/set-up within 7 days.  7. Patient will verbalize and demonstrate understanding of posterior hip precautions per protocol within 4 days.    Outcome: Progressing     PHYSICAL THERAPY TREATMENT    Patient: Maya Cantu (85 y.o. female)  Date: 7/3/2025  Diagnosis: Closed fracture of neck of right femur, initial encounter ([facility]) [S72.001A]  Traumatic hematoma of head, initial encounter [S00.93XA] Closed fracture of neck of right femur, initial encounter ([facility])  Procedure(s) (LRB):  RIGHT HIP HEMIARTHROPLASTY ANTERIOR APPROACH (Right) 3 Days Post-Op  Precautions: Restrictions/Precautions  Restrictions/Precautions: Fall Risk     Position Activity Restriction  Hip Precautions: Posterior hip precautions    ASSESSMENT:  Patient continues to benefit from skilled PT services and is slowly progressing towards goals. Patient

## 2025-07-03 NOTE — PLAN OF CARE
Problem: Discharge Planning  Goal: Discharge to home or other facility with appropriate resources  7/2/2025 2234 by Sri De Santiago RN  Outcome: Progressing  7/2/2025 1137 by Rose Heard LPN  Outcome: Progressing     Problem: Pain  Goal: Verbalizes/displays adequate comfort level or baseline comfort level  7/2/2025 2234 by Sri De Santiago RN  Outcome: Progressing  7/2/2025 1137 by Rose Heard LPN  Outcome: Progressing     Problem: Skin/Tissue Integrity  Goal: Skin integrity remains intact  Description: 1.  Monitor for areas of redness and/or skin breakdown  2.  Assess vascular access sites hourly  3.  Every 4-6 hours minimum:  Change oxygen saturation probe site  4.  Every 4-6 hours:  If on nasal continuous positive airway pressure, respiratory therapy assess nares and determine need for appliance change or resting period  7/2/2025 2234 by Sri De Santiago RN  Outcome: Progressing  7/2/2025 1137 by Rose Heard LPN  Outcome: Progressing     Problem: Safety - Adult  Goal: Free from fall injury  7/2/2025 2234 by Sri De Santiago RN  Outcome: Progressing  7/2/2025 1137 by Rose Heard LPN  Outcome: Progressing     Problem: ABCDS Injury Assessment  Goal: Absence of physical injury  7/2/2025 2234 by Sri De Santiago RN  Outcome: Progressing  7/2/2025 1137 by Rose Heard LPN  Outcome: Progressing     Problem: Occupational Therapy - Adult  Goal: By Discharge: Performs self-care activities at highest level of function for planned discharge setting.  See evaluation for individualized goals.  Description: FUNCTIONAL STATUS PRIOR TO ADMISSION:  Pt was independent ADLs, family assisting with IADLs PRN, pt was driving, and ambulating without AD  Receives Help From: Family, Prior Level of Assist for ADLs: Independent,  ,  ,  ,  ,  , Prior Level of Assist for Homemaking: Independent (Family assists w/ cooking, cleaning,laundry PRN), Ambulation Assistance: Independent, Prior Level of Assist for Transfers:

## 2025-07-03 NOTE — PLAN OF CARE
Problem: Discharge Planning  Goal: Discharge to home or other facility with appropriate resources  7/3/2025 1027 by Rose Heard LPN  Outcome: Progressing  7/2/2025 2234 by Sri De Santiago RN  Outcome: Progressing     Problem: Pain  Goal: Verbalizes/displays adequate comfort level or baseline comfort level  7/3/2025 1027 by Rose Heard LPN  Outcome: Progressing  7/2/2025 2234 by Sri De Santiago RN  Outcome: Progressing     Problem: Skin/Tissue Integrity  Goal: Skin integrity remains intact  Description: 1.  Monitor for areas of redness and/or skin breakdown  2.  Assess vascular access sites hourly  3.  Every 4-6 hours minimum:  Change oxygen saturation probe site  4.  Every 4-6 hours:  If on nasal continuous positive airway pressure, respiratory therapy assess nares and determine need for appliance change or resting period  7/3/2025 1027 by Rose Heard LPN  Outcome: Progressing  7/2/2025 2234 by Sri De Santiago RN  Outcome: Progressing     Problem: Safety - Adult  Goal: Free from fall injury  7/3/2025 1027 by Rose Heard LPN  Outcome: Progressing  7/2/2025 2234 by Sri De Santiago RN  Outcome: Progressing     Problem: ABCDS Injury Assessment  Goal: Absence of physical injury  7/3/2025 1027 by Rose Heard LPN  Outcome: Progressing  7/2/2025 2234 by Sri De Santiago RN  Outcome: Progressing

## 2025-07-03 NOTE — DISCHARGE SUMMARY
Hospitalist Discharge Summary     Patient ID:  Maya Cantu  [id number]  85 y.o.  1940    Admit date: 6/30/2025    Discharge date and time: 7/3/2025    Admission Diagnoses: Closed fracture of neck of right femur, initial encounter ([facility]) [S72.001A]  Traumatic hematoma of head, initial encounter [S00.93XA]    Discharge Diagnoses:    Principal Problem:    Closed fracture of neck of right femur, initial encounter ([facility])  Resolved Problems:    * No resolved hospital problems. *         Hospital Course:         Maya is a 85 y.o. female with PMHx HTN, CKD, insomnia, anemia who presented 6/30/25 for ground level mechanical fall with subsequent R hip pain.      Ground level fall  Acute displaced right femoral neck fracture   - Fracture confirmed on imaging   - Ortho consulted s/p repair hemiarthroplasty 6/30/25  - Continue tylenol PRN with PO dilaudid for severe breakthrough pain. Allergy to oxycodone/tramadol family & doing well on this regimen  - Acitivity: WBAT   - DVT ppx: Asa 81 BID x 30 days on DC with protonix   - PT/OT recommending HH. Lives with son. DME arranged   - Follow up with Dr. Avery in 2 weeks      AHRF  - Placed on 1.5L NC on admission. XR on admission showed Perihilar and bibasilar atelectasis or interstitial infiltrate left greater than right.  - No infectious symptoms, suspect atelectasis due to fx/pain  - Repeat CXR with no acute findings  - Asymptomatic and now on RA     Anemia   - Hb 11.0 on admission, at/better than baseline which is around 10 - 11. Normal iron, B12, folate.   - As expected, Hb drop noted post op & due to IVF  - Stabilized between 8 - 9  - Repeat labs OP with PCP in 7 - 10 days      CKD   - Cr on admission 1.38, baseline ~1.1 - 1.4  - Improved, at/better than baseline      Hyponatremia  - Mild, chronic   - Unlikely clinically significant. Stable.   - Repeat labs OP with PCP in 7 - 10 days      Right frontal scalp hematoma   - CT head/C spine with no other acute findings

## 2025-07-03 NOTE — PLAN OF CARE
Occupational Therapy Note:    OT tx attempt at 10:12am however pt sound asleep and family and pt requesting she rest for now. Will continue to follow pt and re-attempt OT tx at later time/date as able. Thank you.    Brittany Soares, OTS

## 2025-07-03 NOTE — PLAN OF CARE
Problem: Discharge Planning  Goal: Discharge to home or other facility with appropriate resources  7/3/2025 1108 by Rose Heard LPN  Outcome: Completed  7/3/2025 1027 by Rose Heard LPN  Outcome: Progressing  7/2/2025 2234 by Sri De Santiago RN  Outcome: Progressing     Problem: Pain  Goal: Verbalizes/displays adequate comfort level or baseline comfort level  7/3/2025 1108 by Rose Heard LPN  Outcome: Completed  7/3/2025 1108 by Rose Heard LPN  Outcome: Progressing  7/3/2025 1027 by Rose Heard LPN  Outcome: Progressing  7/2/2025 2234 by Sri De Santiago RN  Outcome: Progressing     Problem: Skin/Tissue Integrity  Goal: Skin integrity remains intact  Description: 1.  Monitor for areas of redness and/or skin breakdown  2.  Assess vascular access sites hourly  3.  Every 4-6 hours minimum:  Change oxygen saturation probe site  4.  Every 4-6 hours:  If on nasal continuous positive airway pressure, respiratory therapy assess nares and determine need for appliance change or resting period  7/3/2025 1108 by Rose Heard LPN  Outcome: Completed  7/3/2025 1108 by Rose Heard LPN  Outcome: Progressing  7/3/2025 1027 by Rose Heard LPN  Outcome: Progressing  7/2/2025 2234 by Sri De Santiago RN  Outcome: Progressing     Problem: Safety - Adult  Goal: Free from fall injury  7/3/2025 1108 by Rose Heard LPN  Outcome: Completed  7/3/2025 1108 by Rose Heard LPN  Outcome: Progressing  7/3/2025 1027 by Rose Heard LPN  Outcome: Progressing  7/2/2025 2234 by Sri De Santiago, RN  Outcome: Progressing     Problem: ABCDS Injury Assessment  Goal: Absence of physical injury  7/3/2025 1108 by Rose Heard LPN  Outcome: Completed  7/3/2025 1108 by Rose Heard LPN  Outcome: Progressing  7/3/2025 1027 by Rose Heard LPN  Outcome: Progressing  7/2/2025 2234 by Sri De Santiago, RN  Outcome: Progressing     Problem: Physical Therapy - Adult  Goal: By Discharge: Performs mobility at highest level of

## 2025-07-03 NOTE — CARE COORDINATION
7/3/2025  11:05 AM  Care Management Progress Note    Reason for Admission:   Closed fracture of neck of right femur, initial encounter ([facility]) [S72.001A]  Traumatic hematoma of head, initial encounter [S00.93XA]  Procedure(s) (LRB):  RIGHT HIP HEMIARTHROPLASTY ANTERIOR APPROACH (Right)  3 Days Post-Op    Patient Admission Status: Inpatient  Date Admitted to INP: 7/3/25 []NA - OBS/Outpatient  RUR: Readmission Risk Score: 14.7    Hospitalization in the last 30 days (Readmission):  No        Transition of care plan:  Discharge order submitted. Pt has medically cleared.   Home with [facility] notified of pt's discharge today via [employer], and DC summary attached. Pt's family will provide pt assist.   Date IM given: 7/2/25 []NA  Outpatient follow-up.  Discharge transport: Family       06/30/25 0914   Service Assessment   Patient Orientation Alert and Oriented   Cognition Alert   History Provided By Child/Family;Patient   Primary Caregiver Self   Support Systems Children;Family Members   Patient's Healthcare Decision Maker is: Legal Next of Kin  (Lawson Darling (C) [phone])   PCP Verified by CM Yes  (Reuben Millard MD)   Last Visit to PCP Within last 3 months   Prior Functional Level Independent in ADLs/IADLs;Assistance with the following:;Cooking;Housework;Shopping  (Family assists w/ iADLS PRN)   Current Functional Level Other (see comment)  (PT, OT evals pending)   Can patient return to prior living arrangement Unknown at present   Ability to make needs known: Good   Family able to assist with home care needs: Yes   Financial Resources Medicare;Other (Comment)  (Medicare, )   Community Resources None   Social/Functional History   Lives With Family;Son   Type of Home House   Home Layout Two level;Bed/Bath upstairs   Home Access Stairs to enter with rails   Entrance Stairs - Number of Steps 4 through garage or front porch   Bathroom Shower/Tub Tub/Shower unit   Bathroom Toilet Standard   Bathroom

## 2025-08-14 ENCOUNTER — OFFICE VISIT (OUTPATIENT)
Age: 85
End: 2025-08-14
Payer: MEDICARE

## 2025-08-14 VITALS
SYSTOLIC BLOOD PRESSURE: 165 MMHG | HEIGHT: 63 IN | WEIGHT: 138 LBS | TEMPERATURE: 97.8 F | RESPIRATION RATE: 16 BRPM | BODY MASS INDEX: 24.45 KG/M2 | HEART RATE: 78 BPM | DIASTOLIC BLOOD PRESSURE: 77 MMHG | OXYGEN SATURATION: 98 %

## 2025-08-14 DIAGNOSIS — D62 ANEMIA DUE TO ACUTE BLOOD LOSS: ICD-10-CM

## 2025-08-14 DIAGNOSIS — N18.31 STAGE 3A CHRONIC KIDNEY DISEASE (HCC): ICD-10-CM

## 2025-08-14 DIAGNOSIS — D47.2 SMOLDERING MULTIPLE MYELOMA (SMM): Primary | ICD-10-CM

## 2025-08-14 DIAGNOSIS — S72.001S CLOSED FRACTURE OF NECK OF RIGHT FEMUR, SEQUELA: ICD-10-CM

## 2025-08-14 DIAGNOSIS — D47.2 SMOLDERING MULTIPLE MYELOMA (SMM): ICD-10-CM

## 2025-08-14 DIAGNOSIS — Z86.39 HISTORY OF IRON DEFICIENCY: ICD-10-CM

## 2025-08-14 DIAGNOSIS — Z78.0 POST-MENOPAUSAL: ICD-10-CM

## 2025-08-14 PROCEDURE — 99215 OFFICE O/P EST HI 40 MIN: CPT | Performed by: NURSE PRACTITIONER

## 2025-08-14 RX ORDER — QUETIAPINE FUMARATE 25 MG/1
25 TABLET, FILM COATED ORAL NIGHTLY
COMMUNITY
Start: 2025-06-05

## 2025-09-04 ENCOUNTER — TELEPHONE (OUTPATIENT)
Age: 85
End: 2025-09-04

## 2025-09-04 DIAGNOSIS — R94.39 ABNORMAL STRESS TEST: Primary | ICD-10-CM

## (undated) DEVICE — Device

## (undated) DEVICE — YANKAUER,SMOOTH HANDLE,HIGH CAPACITY: Brand: MEDLINE INDUSTRIES, INC.

## (undated) DEVICE — 3M™ CUROS™ DISINFECTING CAP FOR NEEDLELESS CONNECTORS 270/CARTON 20 CARTONS/CASE CFF1-270: Brand: CUROS™

## (undated) DEVICE — STRYKER PERFORMANCE SERIES SAGITTAL BLADE: Brand: STRYKER PERFORMANCE SERIES

## (undated) DEVICE — 1200 GUARD II KIT W/5MM TUBE W/O VAC TUBE: Brand: GUARDIAN

## (undated) DEVICE — SOLUTION WND IRRIGATION 450 ML 0.5 PVP-I 0.9 NACL

## (undated) DEVICE — SOLUTION IRRIG 1000ML H2O PIC PLAS SHATTERPROOF CONTAINER

## (undated) DEVICE — BITEBLOCK ENDOSCP 60FR MAXI WHT POLYETH STURDY W/ VELC WVN

## (undated) DEVICE — HOOD SURG T7

## (undated) DEVICE — DRESSING FOAM 4X8IN DISP POSTOP MEPILEX BORD AG

## (undated) DEVICE — SUTURE VICRYL + SZ 2-0 L18IN ABSRB UD CT1 L36MM 1/2 CIR VCP839D

## (undated) DEVICE — SUTURE STRATAFIX SYMMETRIC SZ 1 L18IN ABSRB VLT CT1 L36CM SXPP1A404

## (undated) DEVICE — KIT POS FOAM HANA TBL

## (undated) DEVICE — DRAPE SURG 1 MIN SET TBL ESYSUIT

## (undated) DEVICE — SOLIDIFIER FLD 2OZ 1500CC N DISINF IN BTL DISP SAFESORB

## (undated) DEVICE — TAPE,CLOTH/SILK,CURAD,3"X10YD,LF,40/CS: Brand: CURAD

## (undated) DEVICE — SUTURE ABSRB L30CM 2-0 VLT SPRL PDS + STRATAFIX SXPP1B410

## (undated) DEVICE — BAG SPEC BIOHZRD 10 X 10 IN --

## (undated) DEVICE — SYRINGE MED 5ML STD CLR PLAS LUERLOCK TIP N CTRL DISP

## (undated) DEVICE — SUTURE VICRYL + SZ 1 L27IN ABSRB UD CT-1 L36MM 1/2 CIR TAPR VCPP40D

## (undated) DEVICE — KIT COLON W/ 1.1OZ LUB AND 2 END

## (undated) DEVICE — CATHETER IV 22GA L1IN OD0.8382-0.9144MM ID0.6096-0.6858MM 382523

## (undated) DEVICE — MASTISOL ADHESIVE LIQ 2/3ML

## (undated) DEVICE — PACK PROCEDURE SURG HRT CATH

## (undated) DEVICE — LIQUIBAND RAPID ADHESIVE 36/CS 0.8ML: Brand: MEDLINE

## (undated) DEVICE — INTENDED TO SUPPORT AND MAINTAIN THE POSITION OF AN ANESTHETIZED PATIENT DURING SURGERY: Brand: LINDY TRACTION BOOT LINER

## (undated) DEVICE — 4-PORT MANIFOLD: Brand: NEPTUNE 2

## (undated) DEVICE — FORCEPS BX L240CM JAW DIA2.8MM L CAP W/ NDL MIC MESH TOOTH

## (undated) DEVICE — ADULT SPO2 SENSOR,REMANUFACTURED,REPROCESSED DEVICE FOR SINGLE USE; REPROCESSED BY COVIDIEN LLC: Brand: NELLCOR

## (undated) DEVICE — PINNACLE INTRODUCER SHEATH: Brand: PINNACLE

## (undated) DEVICE — CANN NASAL O2 CAPNOGRAPHY AD -- FILTERLINE

## (undated) DEVICE — CUFF RMFG BP INF SZ 11 DISP -- LAWSON OEM ITEM 238915

## (undated) DEVICE — CATH DIAG-D 6F MULTI PIG 155 5 -- IMPULSE 16599-302

## (undated) DEVICE — PROCEDURE KIT FLUID MGMT CUST MAINFOLD STRL

## (undated) DEVICE — ELECTRODE,RADIOTRANSLUCENT,FOAM,3PK: Brand: MEDLINE

## (undated) DEVICE — BLUNTFILL: Brand: MONOJECT

## (undated) DEVICE — PENCIL SMK EVAC ALL IN 1 DSGN ENH VISIBILITY IMPROVED AIR

## (undated) DEVICE — BLUNTFILL WITH FILTER: Brand: MONOJECT

## (undated) DEVICE — SYRINGE MED 30ML STD CLR PLAS LUERLOCK TIP N CTRL DISP

## (undated) DEVICE — TOTAL JOINT-SFMC: Brand: MEDLINE INDUSTRIES, INC.

## (undated) DEVICE — Device: Brand: JELCO

## (undated) DEVICE — SPONGE GZ W4XL4IN COT 12 PLY TYP VII WVN C FLD DSGN STERILE

## (undated) DEVICE — STRIP,CLOSURE,WOUND,MEDI-STRIP,1/2X4: Brand: MEDLINE

## (undated) DEVICE — SET GRAV CK VLV NEEDLESS ST 3 GANGED 4WAY STPCOCK HI FLO 10

## (undated) DEVICE — SUTURE ETHIBOND EXCEL SZ 5 L30IN NONABSORBABLE GRN L40MM V-37 MB66G

## (undated) DEVICE — CONTAINER SPEC 20 ML LID NEUT BUFF FORMALIN 10 % POLYPR STS

## (undated) DEVICE — DRAPE C ARM W/ POLY STRP W42XL72IN FOR MOB XR

## (undated) DEVICE — GLOVE SURG SZ 8 L12IN FNGR THK79MIL GRN LTX FREE

## (undated) DEVICE — SUTURE MONOCRYL STRATAFIX SPRL + 3 0 SGL ARMED PS 1 24 IN LEN SXMP1B103

## (undated) DEVICE — SOLUTION SCRB 1% POVIDONE IOD BRN ANTIMIC SKIN CLN

## (undated) DEVICE — SYRINGE MED 3ML CLR PLAS STD N CTRL LUERLOCK TIP DISP

## (undated) DEVICE — CANNULA CUSH AD W/ 14FT TBG

## (undated) DEVICE — GLOVE ORTHO 8   MSG9480

## (undated) DEVICE — ELECTRODE BLDE L4IN NONINSULATED EDGE

## (undated) DEVICE — GLOVE ORTHO 7 1/2   MSG9475

## (undated) DEVICE — IV STRT KT 3282] LSL INDUSTRIES INC]

## (undated) DEVICE — SOL IRR SOD CHL 0.9% TITAN XL CNTNR 3000ML

## (undated) DEVICE — 3M™ STERI-DRAPE™ U-DRAPE 1015: Brand: STERI-DRAPE™

## (undated) DEVICE — SET ADMIN 16ML TBNG L100IN 2 Y INJ SITE IV PIGGY BK DISP (ORDER IN MULIPLES OF 48)